# Patient Record
Sex: FEMALE | Race: WHITE | NOT HISPANIC OR LATINO | Employment: UNEMPLOYED | URBAN - METROPOLITAN AREA
[De-identification: names, ages, dates, MRNs, and addresses within clinical notes are randomized per-mention and may not be internally consistent; named-entity substitution may affect disease eponyms.]

---

## 2017-01-08 ENCOUNTER — HOSPITAL ENCOUNTER (INPATIENT)
Facility: HOSPITAL | Age: 62
LOS: 15 days | Discharge: NURSING, REHAB OR LONG-TERM CARE | DRG: 432 | End: 2017-01-23
Attending: EMERGENCY MEDICINE | Admitting: INTERNAL MEDICINE
Payer: MEDICAID

## 2017-01-08 DIAGNOSIS — K70.31 ASCITES DUE TO ALCOHOLIC CIRRHOSIS: ICD-10-CM

## 2017-01-08 DIAGNOSIS — R79.89 ELEVATED TROPONIN: ICD-10-CM

## 2017-01-08 DIAGNOSIS — E87.6 HYPOKALEMIA: ICD-10-CM

## 2017-01-08 DIAGNOSIS — E83.42 HYPOMAGNESEMIA: ICD-10-CM

## 2017-01-08 DIAGNOSIS — G93.41 ENCEPHALOPATHY, METABOLIC: ICD-10-CM

## 2017-01-08 DIAGNOSIS — N17.9 ACUTE ON CHRONIC RENAL FAILURE: Primary | ICD-10-CM

## 2017-01-08 DIAGNOSIS — N30.00 ACUTE CYSTITIS WITHOUT HEMATURIA: ICD-10-CM

## 2017-01-08 DIAGNOSIS — R53.1 GENERALIZED WEAKNESS: ICD-10-CM

## 2017-01-08 DIAGNOSIS — N18.9 ACUTE ON CHRONIC RENAL FAILURE: Primary | ICD-10-CM

## 2017-01-08 PROBLEM — F10.10 CHRONIC ALCOHOL ABUSE: Status: ACTIVE | Noted: 2017-01-08

## 2017-01-08 LAB
ALBUMIN SERPL BCP-MCNC: 2.1 G/DL
ALP SERPL-CCNC: 452 U/L
ALT SERPL W/O P-5'-P-CCNC: 47 U/L
AMMONIA PLAS-SCNC: 65 UMOL/L
AMPHET+METHAMPHET UR QL: NEGATIVE
ANION GAP SERPL CALC-SCNC: 19 MMOL/L
APAP SERPL-MCNC: <3 UG/ML
AST SERPL-CCNC: 119 U/L
BACTERIA #/AREA URNS HPF: ABNORMAL /HPF
BARBITURATES UR QL SCN>200 NG/ML: NEGATIVE
BASOPHILS # BLD AUTO: 0.02 K/UL
BASOPHILS NFR BLD: 0.3 %
BENZODIAZ UR QL SCN>200 NG/ML: NORMAL
BILIRUB SERPL-MCNC: 5.1 MG/DL
BILIRUB UR QL STRIP: ABNORMAL
BNP SERPL-MCNC: 189 PG/ML
BUN SERPL-MCNC: 40 MG/DL
BZE UR QL SCN: NEGATIVE
CALCIUM SERPL-MCNC: 8.6 MG/DL
CANNABINOIDS UR QL SCN: NEGATIVE
CHLORIDE SERPL-SCNC: 90 MMOL/L
CLARITY UR: ABNORMAL
CO2 SERPL-SCNC: 26 MMOL/L
COLOR UR: YELLOW
CREAT SERPL-MCNC: 2.9 MG/DL
CREAT UR-MCNC: 253.3 MG/DL
DIFFERENTIAL METHOD: ABNORMAL
EOSINOPHIL # BLD AUTO: 0 K/UL
EOSINOPHIL NFR BLD: 0 %
ERYTHROCYTE [DISTWIDTH] IN BLOOD BY AUTOMATED COUNT: 15.1 %
EST. GFR  (AFRICAN AMERICAN): 20 ML/MIN/1.73 M^2
EST. GFR  (NON AFRICAN AMERICAN): 17 ML/MIN/1.73 M^2
ETHANOL SERPL-MCNC: <10 MG/DL
GLUCOSE SERPL-MCNC: 94 MG/DL
GLUCOSE UR QL STRIP: NEGATIVE
HCT VFR BLD AUTO: 31.9 %
HGB BLD-MCNC: 11.1 G/DL
HGB UR QL STRIP: ABNORMAL
HYALINE CASTS #/AREA URNS LPF: 1 /LPF
INR PPP: 1.4
KETONES UR QL STRIP: NEGATIVE
LEUKOCYTE ESTERASE UR QL STRIP: ABNORMAL
LYMPHOCYTES # BLD AUTO: 0.8 K/UL
LYMPHOCYTES NFR BLD: 10.8 %
MAGNESIUM SERPL-MCNC: 1.5 MG/DL
MCH RBC QN AUTO: 31.5 PG
MCHC RBC AUTO-ENTMCNC: 34.8 %
MCV RBC AUTO: 91 FL
METHADONE UR QL SCN>300 NG/ML: NEGATIVE
MICROSCOPIC COMMENT: ABNORMAL
MONOCYTES # BLD AUTO: 0.9 K/UL
MONOCYTES NFR BLD: 12 %
NEUTROPHILS # BLD AUTO: 5.9 K/UL
NEUTROPHILS NFR BLD: 76.9 %
NITRITE UR QL STRIP: NEGATIVE
OPIATES UR QL SCN: NEGATIVE
PCP UR QL SCN>25 NG/ML: NEGATIVE
PH UR STRIP: 5 [PH] (ref 5–8)
PLATELET # BLD AUTO: 128 K/UL
PMV BLD AUTO: 11.1 FL
POTASSIUM SERPL-SCNC: 2.4 MMOL/L
PROT SERPL-MCNC: 7.4 G/DL
PROT UR QL STRIP: ABNORMAL
PROTHROMBIN TIME: 14.3 SEC
RBC # BLD AUTO: 3.52 M/UL
RBC #/AREA URNS HPF: 2 /HPF (ref 0–4)
SODIUM SERPL-SCNC: 135 MMOL/L
SP GR UR STRIP: 1.02 (ref 1–1.03)
SQUAMOUS #/AREA URNS HPF: 5 /HPF
TOXICOLOGY INFORMATION: NORMAL
TROPONIN I SERPL DL<=0.01 NG/ML-MCNC: 0.04 NG/ML
TROPONIN I SERPL DL<=0.01 NG/ML-MCNC: 0.04 NG/ML
URN SPEC COLLECT METH UR: ABNORMAL
UROBILINOGEN UR STRIP-ACNC: ABNORMAL EU/DL
WBC # BLD AUTO: 7.67 K/UL
WBC #/AREA URNS HPF: 30 /HPF (ref 0–5)

## 2017-01-08 PROCEDURE — 87088 URINE BACTERIA CULTURE: CPT

## 2017-01-08 PROCEDURE — 96365 THER/PROPH/DIAG IV INF INIT: CPT

## 2017-01-08 PROCEDURE — 80053 COMPREHEN METABOLIC PANEL: CPT

## 2017-01-08 PROCEDURE — 21400001 HC TELEMETRY ROOM

## 2017-01-08 PROCEDURE — P9612 CATHETERIZE FOR URINE SPEC: HCPCS

## 2017-01-08 PROCEDURE — 84484 ASSAY OF TROPONIN QUANT: CPT | Mod: 91

## 2017-01-08 PROCEDURE — 81000 URINALYSIS NONAUTO W/SCOPE: CPT

## 2017-01-08 PROCEDURE — 25000003 PHARM REV CODE 250: Performed by: EMERGENCY MEDICINE

## 2017-01-08 PROCEDURE — 85610 PROTHROMBIN TIME: CPT

## 2017-01-08 PROCEDURE — 36415 COLL VENOUS BLD VENIPUNCTURE: CPT

## 2017-01-08 PROCEDURE — 80329 ANALGESICS NON-OPIOID 1 OR 2: CPT

## 2017-01-08 PROCEDURE — 83880 ASSAY OF NATRIURETIC PEPTIDE: CPT

## 2017-01-08 PROCEDURE — 87077 CULTURE AEROBIC IDENTIFY: CPT

## 2017-01-08 PROCEDURE — 87040 BLOOD CULTURE FOR BACTERIA: CPT

## 2017-01-08 PROCEDURE — 25000003 PHARM REV CODE 250: Performed by: HOSPITALIST

## 2017-01-08 PROCEDURE — 80074 ACUTE HEPATITIS PANEL: CPT

## 2017-01-08 PROCEDURE — 82140 ASSAY OF AMMONIA: CPT

## 2017-01-08 PROCEDURE — 96367 TX/PROPH/DG ADDL SEQ IV INF: CPT

## 2017-01-08 PROCEDURE — 80320 DRUG SCREEN QUANTALCOHOLS: CPT

## 2017-01-08 PROCEDURE — 99285 EMERGENCY DEPT VISIT HI MDM: CPT | Mod: 25

## 2017-01-08 PROCEDURE — 63600175 PHARM REV CODE 636 W HCPCS: Performed by: EMERGENCY MEDICINE

## 2017-01-08 PROCEDURE — 83735 ASSAY OF MAGNESIUM: CPT

## 2017-01-08 PROCEDURE — 85025 COMPLETE CBC W/AUTO DIFF WBC: CPT

## 2017-01-08 PROCEDURE — 96361 HYDRATE IV INFUSION ADD-ON: CPT

## 2017-01-08 PROCEDURE — 96375 TX/PRO/DX INJ NEW DRUG ADDON: CPT

## 2017-01-08 PROCEDURE — 87186 SC STD MICRODIL/AGAR DIL: CPT

## 2017-01-08 PROCEDURE — 82570 ASSAY OF URINE CREATININE: CPT

## 2017-01-08 PROCEDURE — 87086 URINE CULTURE/COLONY COUNT: CPT

## 2017-01-08 RX ORDER — FOLIC ACID 1 MG/1
1 TABLET ORAL DAILY
Status: DISCONTINUED | OUTPATIENT
Start: 2017-01-09 | End: 2017-01-09

## 2017-01-08 RX ORDER — ATORVASTATIN CALCIUM 40 MG/1
40 TABLET, FILM COATED ORAL DAILY
COMMUNITY
End: 2018-07-25

## 2017-01-08 RX ORDER — SODIUM CHLORIDE 9 MG/ML
INJECTION, SOLUTION INTRAVENOUS CONTINUOUS
Status: ACTIVE | OUTPATIENT
Start: 2017-01-08 | End: 2017-01-09

## 2017-01-08 RX ORDER — POTASSIUM CHLORIDE 7.45 MG/ML
10 INJECTION INTRAVENOUS
Status: COMPLETED | OUTPATIENT
Start: 2017-01-08 | End: 2017-01-08

## 2017-01-08 RX ORDER — ASPIRIN 325 MG
325 TABLET ORAL
Status: COMPLETED | OUTPATIENT
Start: 2017-01-08 | End: 2017-01-08

## 2017-01-08 RX ORDER — SODIUM CHLORIDE 9 MG/ML
500 INJECTION, SOLUTION INTRAVENOUS
Status: COMPLETED | OUTPATIENT
Start: 2017-01-08 | End: 2017-01-08

## 2017-01-08 RX ORDER — TRAZODONE HYDROCHLORIDE 50 MG/1
50 TABLET ORAL NIGHTLY
COMMUNITY
End: 2018-07-25

## 2017-01-08 RX ORDER — HYDROCHLOROTHIAZIDE 25 MG/1
25 TABLET ORAL DAILY
COMMUNITY
End: 2018-07-25

## 2017-01-08 RX ORDER — RAMELTEON 8 MG/1
8 TABLET ORAL NIGHTLY PRN
Status: DISCONTINUED | OUTPATIENT
Start: 2017-01-08 | End: 2017-01-23 | Stop reason: HOSPADM

## 2017-01-08 RX ORDER — SERTRALINE HYDROCHLORIDE 100 MG/1
100 TABLET, FILM COATED ORAL DAILY
COMMUNITY
End: 2018-07-25

## 2017-01-08 RX ORDER — FOLIC ACID 1 MG/1
1 TABLET ORAL DAILY
COMMUNITY
End: 2018-07-25

## 2017-01-08 RX ORDER — ESOMEPRAZOLE MAGNESIUM 40 MG/1
40 CAPSULE, DELAYED RELEASE ORAL
COMMUNITY
End: 2018-07-25

## 2017-01-08 RX ORDER — MAGNESIUM SULFATE 1 G/100ML
2 INJECTION INTRAVENOUS
Status: COMPLETED | OUTPATIENT
Start: 2017-01-08 | End: 2017-01-08

## 2017-01-08 RX ORDER — ONDANSETRON 2 MG/ML
4 INJECTION INTRAMUSCULAR; INTRAVENOUS EVERY 8 HOURS PRN
Status: DISCONTINUED | OUTPATIENT
Start: 2017-01-08 | End: 2017-01-09

## 2017-01-08 RX ORDER — POTASSIUM CHLORIDE 20 MEQ/1
60 TABLET, EXTENDED RELEASE ORAL
Status: COMPLETED | OUTPATIENT
Start: 2017-01-08 | End: 2017-01-08

## 2017-01-08 RX ORDER — SODIUM CHLORIDE 0.9 % (FLUSH) 0.9 %
3 SYRINGE (ML) INJECTION EVERY 8 HOURS
Status: DISCONTINUED | OUTPATIENT
Start: 2017-01-08 | End: 2017-01-23 | Stop reason: HOSPADM

## 2017-01-08 RX ORDER — HYDROXYZINE PAMOATE 25 MG/1
25 CAPSULE ORAL 4 TIMES DAILY
COMMUNITY
End: 2018-07-25

## 2017-01-08 RX ADMIN — POTASSIUM CHLORIDE 10 MEQ: 10 INJECTION, SOLUTION INTRAVENOUS at 05:01

## 2017-01-08 RX ADMIN — SODIUM CHLORIDE: 0.9 INJECTION, SOLUTION INTRAVENOUS at 10:01

## 2017-01-08 RX ADMIN — RAMELTEON 8 MG: 8 TABLET, FILM COATED ORAL at 11:01

## 2017-01-08 RX ADMIN — SODIUM CHLORIDE 500 ML: 0.9 INJECTION, SOLUTION INTRAVENOUS at 05:01

## 2017-01-08 RX ADMIN — SODIUM CHLORIDE 500 ML: 0.9 INJECTION, SOLUTION INTRAVENOUS at 02:01

## 2017-01-08 RX ADMIN — Medication 3 ML: at 10:01

## 2017-01-08 RX ADMIN — MAGNESIUM SULFATE 2 G: 1 INJECTION INTRAVENOUS at 04:01

## 2017-01-08 RX ADMIN — ASPIRIN 325 MG ORAL TABLET 325 MG: 325 PILL ORAL at 06:01

## 2017-01-08 RX ADMIN — CEFTRIAXONE 2 G: 2 INJECTION, SOLUTION INTRAVENOUS at 02:01

## 2017-01-08 RX ADMIN — POTASSIUM CHLORIDE 60 MEQ: 1500 TABLET, EXTENDED RELEASE ORAL at 03:01

## 2017-01-08 NOTE — ED NOTES
Mother Ann Chrissara- [phone]/Sridhar (pt'd ride) [phone]. Call sridhar when pt is d/c. Call mother if she is admitted.

## 2017-01-08 NOTE — ED NOTES
"Pt's daughter Eli comes back into room and is intoxicated. She confesses to having alcoholic drinks prior to coming back into the room. Daughter is loud and crying, asked to leave the room bc she is visibly upsetting the pt. Patients states "she is always stressing me out. She's like that at home".   "

## 2017-01-08 NOTE — ED PROVIDER NOTES
Encounter Date: 1/8/2017    SCRIBE #1 NOTE: I, Juan Carlos Blaise, am scribing for, and in the presence of, Andrey Waters MD. Other sections scribed: HPI, ROS.       History     Chief Complaint   Patient presents with    Fatigue     EMS reports they wwere alled out for pt being waek, Room Air sats 90%, placed on 2L nc , went to 97%     Leg Pain     bilateral leg pain      Review of patient's allergies indicates:  Allergies not on file  HPI Comments: CC: Fatigue, Weakness  HPI: This 61 y.o. female with Crohn's disease presents to the ED via EMS c/o generalized weakness, worst in her legs, for the past week. Pt states that she is barely able to walk because her legs keep giving out. Pt states that she knows that she has had a UTI for the past week because she has been having intermittent dysuria. Sx are acute. She reports normal BM this morning. EMS reports initial SpO2 90% RA, rising to 97% on 2L NC. Pt arrives hypotensive. Pt denies abdominal pain, vomiting, chest pain.      The history is provided by the patient and the EMS personnel.     Past Medical History   Diagnosis Date    Crohn disease     Hypertension      No past medical history pertinent negatives.  History reviewed. No pertinent past surgical history.  History reviewed. No pertinent family history.  Social History   Substance Use Topics    Smoking status: Never Smoker    Smokeless tobacco: None    Alcohol use No     Review of Systems   Constitutional: Positive for fatigue. Negative for fever.   HENT: Negative for congestion and sore throat.    Eyes: Negative for pain and visual disturbance.   Respiratory: Negative for cough.    Cardiovascular: Negative for chest pain.   Gastrointestinal: Negative for abdominal pain, constipation, diarrhea and vomiting.   Genitourinary: Positive for dysuria. Negative for difficulty urinating and hematuria.   Musculoskeletal: Negative for back pain.   Skin: Negative for rash and wound.   Neurological: Positive for  weakness (generalized, worst in legs). Negative for syncope.   All other systems negative.      Physical Exam   Initial Vitals   BP Pulse Resp Temp SpO2   01/08/17 1244 01/08/17 1244 -- 01/08/17 1244 --   90/40 75  99.5 °F (37.5 °C)      Physical Exam   Nursing note and vitals reviewed.  Constitutional: Weak chronically ill female in no obvious distress.  HENT:    Head: NC/AT    Eyes: Conjunctivae normal.  (-) scleral icterus.              Mouth/Throat: Dry mucosal membranes.   Neck: Neck supple, normal rom.  Cardiovascular: RRR   Pulmonary/Chest: CTAB   Abdomen:  Soft, Moderately distended - (+) ascites.  Nontender - no guarding or rebound.  (-) CVA tenderness.  Musculoskeletal:  FROM of all major joints.  +1 pitting edema bilateral lower extremities without specific tenderness  Neurological: A&O x4.  No acute focal motor deficits.    Skin: Skin is intact, warm and dry.   Psychiatric: normal mood and affect.      ED Course   Critical Care  Date/Time: 1/8/2017 7:33 PM  Performed by: PATRICIA MAYNARD.  Authorized by: PATRICIA MAYNARD.   Direct patient critical care time: 20 minutes  Additional history critical care time: 10 minutes  Ordering / reviewing critical care time: 10 minutes  Documentation critical care time: 10 minutes  Consulting other physicians critical care time: 5 minutes  Total critical care time (exclusive of procedural time) : 55 minutes  Critical care time was exclusive of separately billable procedures and treating other patients and teaching time.  Critical care was necessary to treat or prevent imminent or life-threatening deterioration of the following conditions: hepatic failure and renal failure.  Critical care was time spent personally by me on the following activities: development of treatment plan with patient or surrogate, evaluation of patient's response to treatment, examination of patient, obtaining history from patient or surrogate, ordering and performing treatments and  interventions, ordering and review of laboratory studies, ordering and review of radiographic studies, pulse oximetry, re-evaluation of patient's condition and review of old charts.        Labs Reviewed   CBC W/ AUTO DIFFERENTIAL - Abnormal; Notable for the following:        Result Value    RBC 3.52 (*)     Hemoglobin 11.1 (*)     Hematocrit 31.9 (*)     MCH 31.5 (*)     RDW 15.1 (*)     Platelets 128 (*)     Lymph # 0.8 (*)     Gran% 76.9 (*)     Lymph% 10.8 (*)     All other components within normal limits   COMPREHENSIVE METABOLIC PANEL - Abnormal; Notable for the following:     Sodium 135 (*)     Potassium 2.4 (*)     Chloride 90 (*)     BUN, Bld 40 (*)     Creatinine 2.9 (*)     Calcium 8.6 (*)     Albumin 2.1 (*)     Total Bilirubin 5.1 (*)     Alkaline Phosphatase 452 (*)      (*)     ALT 47 (*)     Anion Gap 19 (*)     eGFR if  20 (*)     eGFR if non  17 (*)     All other components within normal limits    Narrative:      Potassium  critical result(s) called and verbal readback obtained   from Shira Polanco, 1/8/17 14:35, 01/08/2017 17:55   PROTIME-INR - Abnormal; Notable for the following:     Prothrombin Time 14.3 (*)     INR 1.4 (*)     All other components within normal limits   TROPONIN I - Abnormal; Notable for the following:     Troponin I 0.041 (*)     All other components within normal limits   MAGNESIUM - Abnormal; Notable for the following:     Magnesium 1.5 (*)     All other components within normal limits   B-TYPE NATRIURETIC PEPTIDE - Abnormal; Notable for the following:      (*)     All other components within normal limits   URINALYSIS - Abnormal; Notable for the following:     Appearance, UA Cloudy (*)     Protein, UA 1+ (*)     Bilirubin (UA) 1+ (*)     Occult Blood UA 2+ (*)     Urobilinogen, UA 4.0-6.0 (*)     Leukocytes, UA 2+ (*)     All other components within normal limits   URINALYSIS MICROSCOPIC - Abnormal; Notable for the following:      WBC, UA 30 (*)     Bacteria, UA Many (*)     All other components within normal limits   CULTURE, URINE   CULTURE, BLOOD   CULTURE, BLOOD   AMMONIA   HEPATITIS PANEL, ACUTE   ALCOHOL,MEDICAL (ETHANOL)   DRUG SCREEN PANEL, URINE EMERGENCY   ACETAMINOPHEN LEVEL     EKG Readings: (Independently Interpreted)   Initial Reading: No STEMI.   Normal sinus rhythm, rate 75, normal axis/intervals, nonspecific ST/T-wave abnormalities.     X-Rays:   Independently Interpreted Readings:   Chest X-Ray: Normal heart size. Bibasilar edema and/or atelectasis/infiltrate.   Other Readings:  Ultrasound abdomen - biliary sludge without evidence of acute cholecystitis.  Ascites of unknown etiology present.      Medical Decision Making:   History:   I obtained history from: someone other than patient.  Old Medical Records: I decided to obtain old medical records.  Old Records Summarized: records from clinic visits and other records.  Independently Interpreted Test(s):   I have ordered and independently interpreted X-rays - see prior notes.  I have ordered and independently interpreted EKG Reading(s) - see prior notes  Clinical Tests:   Lab Tests: Ordered and Reviewed  Radiological Study: Ordered and Reviewed  Medical Tests: Ordered and Reviewed    Additional Medical Decision Makin-year-old female with a history of chronic alcohol abuse/dependency, liver cirrhosis secondary to alcoholism, and COPD presents to the emergency department complaining of generalized weakness and bilateral lower extremity pain x7 days.  Last drink reportedly ~ .  Notable labs include a potassium of 2.4, hypomagnesemia, renal insufficiency, elevated LFTs, coags, and trop (0.041).  On repeat exams, her abdomen has remained soft and nontender.  IV Mg and KCl (IV and PO) have been given.   UA consistent with likely UTi which she has been started on IV Rocephin.  Blood and urine cultures pending.    Despite the elevated troponin, her ekg is w/o  evidence of acute ischemia.  Given her ekg, lack of complaints such as chest pain, shortness of breath, nausea and/or diaphoresis along w/ renal insufficiency, I doubt ACS.  Regardless, aspirin has been given and serial cardiac enzymes to be obtained.       Generalized weakness likely multifactorial including deconditioning/malnutrition, and liver failure resulting ascites, significant electrolyte abnormalities including potassium and magnesium, as well as ongoing UTI.  She will be admitted to medicine for further evaluation and management including serial cardiac enzymes, neuro checks and continued IV antibiotics.  At time of admission, UDS, acute hepatitis panel, ammonia, serum ethanol and Tylenol levels were pending.              Scribe Attestation:   Scribe #1: I performed the above scribed service and the documentation accurately describes the services I performed. I attest to the accuracy of the note.    Attending Attestation:           Physician Attestation for Scribe:  Physician Attestation Statement for Scribe #1: I, Andrey Waters MD, reviewed documentation, as scribed by Juan Carlos Welsh in my presence, and it is both accurate and complete.                 ED Course     Clinical Impression:   The primary encounter diagnosis was Acute on chronic renal failure. Diagnoses of Hypokalemia, Hypomagnesemia, Encephalopathy, metabolic, Ascites due to alcoholic cirrhosis, Generalized weakness, Acute cystitis without hematuria, and Elevated troponin were also pertinent to this visit.    Disposition:   Disposition: Admitted       Andrey Waters MD  01/08/17 2052

## 2017-01-08 NOTE — ED TRIAGE NOTES
"Pt reports bilateral leg pain for 1 week, also c/o fatigue progressively getting worse for 1 week. States "i can hardly get up and walk anymore". PT in no acute distress, appears fatigued, but otherwise AAOX4. Daughter is at the bedside. Daughter appears to be very emotional, pt admits daughter "stresses her out". Daughter Eli asked to wait outside until we run all the tests so that pt can get some rest.   "

## 2017-01-09 LAB
ALBUMIN SERPL BCP-MCNC: 1.8 G/DL
ALP SERPL-CCNC: 352 U/L
ALT SERPL W/O P-5'-P-CCNC: 38 U/L
ANION GAP SERPL CALC-SCNC: 16 MMOL/L
AST SERPL-CCNC: 102 U/L
BASOPHILS # BLD AUTO: 0.02 K/UL
BASOPHILS NFR BLD: 0.3 %
BILIRUB SERPL-MCNC: 3.4 MG/DL
BUN SERPL-MCNC: 39 MG/DL
CALCIUM SERPL-MCNC: 8 MG/DL
CHLORIDE SERPL-SCNC: 95 MMOL/L
CO2 SERPL-SCNC: 24 MMOL/L
CREAT SERPL-MCNC: 2.6 MG/DL
DIFFERENTIAL METHOD: ABNORMAL
EOSINOPHIL # BLD AUTO: 0.1 K/UL
EOSINOPHIL NFR BLD: 1.7 %
ERYTHROCYTE [DISTWIDTH] IN BLOOD BY AUTOMATED COUNT: 15.1 %
EST. GFR  (AFRICAN AMERICAN): 22 ML/MIN/1.73 M^2
EST. GFR  (NON AFRICAN AMERICAN): 19 ML/MIN/1.73 M^2
GLUCOSE SERPL-MCNC: 91 MG/DL
HAV IGM SERPL QL IA: NEGATIVE
HBV CORE IGM SERPL QL IA: NEGATIVE
HBV SURFACE AG SERPL QL IA: NEGATIVE
HCT VFR BLD AUTO: 25.5 %
HCV AB SERPL QL IA: NEGATIVE
HGB BLD-MCNC: 8.7 G/DL
LYMPHOCYTES # BLD AUTO: 1 K/UL
LYMPHOCYTES NFR BLD: 16.9 %
MAGNESIUM SERPL-MCNC: 2 MG/DL
MCH RBC QN AUTO: 30.3 PG
MCHC RBC AUTO-ENTMCNC: 34.1 %
MCV RBC AUTO: 89 FL
MONOCYTES # BLD AUTO: 0.9 K/UL
MONOCYTES NFR BLD: 15 %
NEUTROPHILS # BLD AUTO: 3.8 K/UL
NEUTROPHILS NFR BLD: 66.1 %
PLATELET # BLD AUTO: 113 K/UL
PMV BLD AUTO: 11.2 FL
POTASSIUM SERPL-SCNC: 2.5 MMOL/L
POTASSIUM SERPL-SCNC: 3 MMOL/L
PROT SERPL-MCNC: 6 G/DL
RBC # BLD AUTO: 2.87 M/UL
SODIUM SERPL-SCNC: 135 MMOL/L
TROPONIN I SERPL DL<=0.01 NG/ML-MCNC: 0.05 NG/ML
WBC # BLD AUTO: 5.81 K/UL

## 2017-01-09 PROCEDURE — 85025 COMPLETE CBC W/AUTO DIFF WBC: CPT

## 2017-01-09 PROCEDURE — G8978 MOBILITY CURRENT STATUS: HCPCS | Mod: CK

## 2017-01-09 PROCEDURE — 36415 COLL VENOUS BLD VENIPUNCTURE: CPT

## 2017-01-09 PROCEDURE — 84132 ASSAY OF SERUM POTASSIUM: CPT

## 2017-01-09 PROCEDURE — 0W9G3ZZ DRAINAGE OF PERITONEAL CAVITY, PERCUTANEOUS APPROACH: ICD-10-PCS | Performed by: RADIOLOGY

## 2017-01-09 PROCEDURE — 25000003 PHARM REV CODE 250: Performed by: HOSPITALIST

## 2017-01-09 PROCEDURE — 63600175 PHARM REV CODE 636 W HCPCS: Performed by: HOSPITALIST

## 2017-01-09 PROCEDURE — 21400001 HC TELEMETRY ROOM

## 2017-01-09 PROCEDURE — 97161 PT EVAL LOW COMPLEX 20 MIN: CPT

## 2017-01-09 PROCEDURE — 84484 ASSAY OF TROPONIN QUANT: CPT

## 2017-01-09 PROCEDURE — G8979 MOBILITY GOAL STATUS: HCPCS | Mod: CI

## 2017-01-09 PROCEDURE — 94761 N-INVAS EAR/PLS OXIMETRY MLT: CPT

## 2017-01-09 PROCEDURE — 99000 SPECIMEN HANDLING OFFICE-LAB: CPT

## 2017-01-09 PROCEDURE — 25000003 PHARM REV CODE 250: Performed by: EMERGENCY MEDICINE

## 2017-01-09 PROCEDURE — 63600175 PHARM REV CODE 636 W HCPCS: Performed by: EMERGENCY MEDICINE

## 2017-01-09 PROCEDURE — 83735 ASSAY OF MAGNESIUM: CPT

## 2017-01-09 PROCEDURE — 80053 COMPREHEN METABOLIC PANEL: CPT

## 2017-01-09 RX ORDER — POTASSIUM CHLORIDE 20 MEQ/15ML
60 SOLUTION ORAL DAILY
Status: DISCONTINUED | OUTPATIENT
Start: 2017-01-09 | End: 2017-01-09

## 2017-01-09 RX ORDER — POTASSIUM CHLORIDE 20 MEQ/15ML
60 SOLUTION ORAL ONCE
Status: COMPLETED | OUTPATIENT
Start: 2017-01-09 | End: 2017-01-09

## 2017-01-09 RX ORDER — FOLIC ACID 1 MG/1
1 TABLET ORAL DAILY
Status: DISCONTINUED | OUTPATIENT
Start: 2017-01-10 | End: 2017-01-23 | Stop reason: HOSPADM

## 2017-01-09 RX ORDER — ONDANSETRON 2 MG/ML
8 INJECTION INTRAMUSCULAR; INTRAVENOUS EVERY 8 HOURS PRN
Status: DISCONTINUED | OUTPATIENT
Start: 2017-01-09 | End: 2017-01-23 | Stop reason: HOSPADM

## 2017-01-09 RX ORDER — LACTULOSE 10 G/15ML
30 SOLUTION ORAL 3 TIMES DAILY
Status: DISCONTINUED | OUTPATIENT
Start: 2017-01-09 | End: 2017-01-23 | Stop reason: HOSPADM

## 2017-01-09 RX ORDER — DIAZEPAM 5 MG/1
5 TABLET ORAL EVERY 8 HOURS
Status: DISCONTINUED | OUTPATIENT
Start: 2017-01-09 | End: 2017-01-09

## 2017-01-09 RX ADMIN — LACTULOSE 30 G: 10 SOLUTION ORAL at 02:01

## 2017-01-09 RX ADMIN — LACTULOSE 30 G: 10 SOLUTION ORAL at 09:01

## 2017-01-09 RX ADMIN — DIAZEPAM 5 MG: 5 TABLET ORAL at 05:01

## 2017-01-09 RX ADMIN — Medication 3 ML: at 10:01

## 2017-01-09 RX ADMIN — CEFTRIAXONE 2 G: 2 INJECTION, SOLUTION INTRAVENOUS at 02:01

## 2017-01-09 RX ADMIN — Medication 3 ML: at 02:01

## 2017-01-09 RX ADMIN — RIFAXIMIN 550 MG: 550 TABLET ORAL at 11:01

## 2017-01-09 RX ADMIN — RAMELTEON 8 MG: 8 TABLET, FILM COATED ORAL at 09:01

## 2017-01-09 RX ADMIN — LACTULOSE 30 G: 10 SOLUTION ORAL at 05:01

## 2017-01-09 RX ADMIN — CEFTRIAXONE 2 G: 2 INJECTION, SOLUTION INTRAVENOUS at 04:01

## 2017-01-09 RX ADMIN — SODIUM CHLORIDE 500 ML: 0.9 INJECTION, SOLUTION INTRAVENOUS at 05:01

## 2017-01-09 RX ADMIN — RIFAXIMIN 550 MG: 550 TABLET ORAL at 09:01

## 2017-01-09 RX ADMIN — POTASSIUM CHLORIDE 60 MEQ: 20 SOLUTION ORAL at 04:01

## 2017-01-09 RX ADMIN — FOLIC ACID: 5 INJECTION, SOLUTION INTRAMUSCULAR; INTRAVENOUS; SUBCUTANEOUS at 05:01

## 2017-01-09 RX ADMIN — Medication 3 ML: at 06:01

## 2017-01-09 NOTE — ASSESSMENT & PLAN NOTE
· As evidenced by physical exam and lab studies  · Hepatitis panel pending  · Diagnostic paracentesis by IR

## 2017-01-09 NOTE — PT/OT/SLP EVAL
Physical Therapy  Evaluation    Tana Brewster   MRN: [id number]   Admitting Diagnosis: Generalized weakness    PT Received On: 17  PT Start Time: 1503     PT Stop Time: 1516    PT Total Time (min): 13 min       Billable Minutes:  Evaluation 13     Diagnosis: Generalized weakness  Frequent falls at home    Past Medical History   Diagnosis Date    Crohn disease     Hypertension       History reviewed. No pertinent past surgical history.    Referring physician: Vinnie  Date referred to PT: 17    General Precautions: Standard, fall  Orthopedic Precautions: Full weight bearing   Braces: N/A       Patient History:  Lives With: child(crystal), adult (daughter )  Living Arrangements: house  Home Accessibility: stairs to enter home  Number of Stairs to Enter Home: 6  Stair Railings at Home: outside, present on right side  Living Environment Comment: Per patient and mother she has been getting weak the past month.   Equipment Currently Used at Home: cane, straight, walker, rolling, shower chair    Previous Level of Function:  Ambulation Skills: needs device (was getting weak the past month and started using a cane. she has had several falls at home and on one fall she took her daughter down with her. )  Transfer Skills: needs device    Subjective:  Communicated with nurse Henley prior to session.  Patient agreeable to try and participate with PT after max encouragement.   Chief Complaint: Weakness and fatigue.   Patient goals: To get stronger.     Pain Ratin/10     Objective:   Patient found with: peripheral IV, telemetry     Cognitive Exam:  Oriented to: Person and Place    Follows Commands/attention: Follows one-step commands  Communication: clear/fluent  Safety awareness/insight to disability: impaired    Physical Exam:  Postural examination/scapula alignment: No postural abnormalities identified    Skin integrity: Visible skin intact  Edema: None noted     Sensation:   Intact    Lower Extremity Range of Motion:  Right  Lower Extremity: WFL  Left Lower Extremity: WFL    Lower Extremity Strength:  Right Lower Extremity: 3+/5  Left Lower Extremity: 3+/5     Gross motor coordination: WFL    Functional Mobility:  Bed Mobility:  Scooting/Bridging: Stand by Assistance, With side rail (to scoot higher in bed)  Supine to Sit: Minimum Assistance, With side rail  Sit to Supine: Stand by Assistance    Transfers:  Sit <> Stand Assistance:  (patient declined due to fatigue)    Balance:   Static Sit: FAIR+: Able to take MINIMAL challenges from all directions Patient only tolerated sitting on edge of bed ~5 minutes then requested to lay back down due to fatigue.   Dynamic Sit: FAIR+: Maintains balance through MINIMAL excursions of active trunk motion    AM-PAC 6 CLICK MOBILITY  How much help from another person does this patient currently need?   1 = Unable, Total/Dependent Assistance  2 = A lot, Maximum/Moderate Assistance  3 = A little, Minimum/Contact Guard/Supervision  4 = None, Modified [place]/Independent    Turning over in bed (including adjusting bedclothes, sheets and blankets)?: 3  Sitting down on and standing up from a chair with arms (e.g., wheelchair, bedside commode, etc.): 3  Moving from lying on back to sitting on the side of the bed?: 3  Moving to and from a bed to a chair (including a wheelchair)?: 3  Need to walk in hospital room?: 3  Climbing 3-5 steps with a railing?: 2  Total Score: 17     AM-PAC Raw Score CMS G-Code Modifier Level of Impairment Assistance   6 % Total / Unable   7 - 9 CM 80 - 100% Maximal Assist   10 - 14 CL 60 - 80% Moderate Assist   15 - 19 CK 40 - 60% Moderate Assist   20 - 22 CJ 20 - 40% Minimal Assist   23 CI 1-20% SBA / CGA   24 CH 0% Independent/ Mod I     Patient left supine with all lines intact, call button in reach, nurse notified and mother present.    Assessment:   Tana Brewster is a 61 y.o. female with a medical diagnosis of Generalized weakness and presents with weakness and fatigue.  She needed max encouragement to sit on edge of bed but declined standing despite encouragement from PT and mother. She would continue to benefit from PT while in the hospital in order to address deficits listed below and get patient back to PLOF.     Rehab identified problem list/impairments: Rehab identified problem list/impairments: weakness, impaired endurance, impaired functional mobilty, gait instability, impaired balance, decreased lower extremity function    Rehab potential is fair.    Activity tolerance: Poor    Discharge recommendations: Discharge Facility/Level Of Care Needs:  (TBD based on progress)     Barriers to discharge: Barriers to Discharge: Decreased caregiver support    Equipment recommendations: Equipment Needed After Discharge: bedside commode     GOALS:   Physical Therapy Goals        Problem: Physical Therapy Goal    Goal Priority Disciplines Outcome Goal Variances Interventions   Physical Therapy Goal     PT/OT, PT      Description:  Goals to be met by: 17    Patient will increase functional independence with mobility by performin. Supine to sit with supervision  2. Sit to stand transfer assess when able  3. Gait assess when able  4. Lower extremity exercise program x30 reps per handout, with supervision              PLAN:    Patient to be seen 6 x/week to address the above listed problems via gait training, therapeutic activities, therapeutic exercises  Plan of Care expires: 17  Plan of Care reviewed with: patient, mother    Functional Assessment Tool Used: AM PAC   Score: 17  Functional Limitation: Mobility: Walking and moving around  Mobility: Walking and Moving Around Current Status (): CK  Mobility: Walking and Moving Around Goal Status (): ANA Mendes, PT, MOT  2017

## 2017-01-09 NOTE — PLAN OF CARE
Recommendations     Recommendation/Intervention:  1. Rec add boost plus tid   2. Rec lifting Renal diet restriction with low K levels  3. RD to monitor  Goals: Pt. to meet >85% EEN  Nutrition Goal Status: new  Communication of RD Recs: reviewed with RN     Continuum of Care Plan     Referral to Outpatient Services: (D/C plannin gm sodium diet with supplements as needed)

## 2017-01-09 NOTE — PLAN OF CARE
Problem: Fall Risk (Adult)  Goal: Identify Related Risk Factors and Signs and Symptoms  Related risk factors and signs and symptoms are identified upon initiation of Human Response Clinical Practice Guideline (CPG)   Outcome: Ongoing (interventions implemented as appropriate)    01/09/17 1727   Fall Risk   Related Risk Factors (Fall Risk) bladder function altered;depression/anxiety;history of falls;polypharmacy;environment unfamiliar       Goal: Absence of Falls  Patient will demonstrate the desired outcomes by discharge/transition of care.   Outcome: Ongoing (interventions implemented as appropriate)    01/09/17 1727   Fall Risk (Adult)   Absence of Falls making progress toward outcome         Problem: Infection, Risk/Actual (Adult)  Goal: Identify Related Risk Factors and Signs and Symptoms  Related risk factors and signs and symptoms are identified upon initiation of Human Response Clinical Practice Guideline (CPG)   Outcome: Ongoing (interventions implemented as appropriate)    01/09/17 1727   Infection, Risk/Actual   Related Risk Factors (Infection, Risk/Actual) tissue perfusion altered;malnutrition   Signs and Symptoms (Infection, Risk/Actual) lab value changes       Goal: Infection Prevention/Resolution  Patient will demonstrate the desired outcomes by discharge/transition of care.   Outcome: Ongoing (interventions implemented as appropriate)    01/09/17 1727   Infection, Risk/Actual (Adult)   Infection Prevention/Resolution making progress toward outcome         Problem: Patient Care Overview  Goal: Plan of Care Review  Outcome: Ongoing (interventions implemented as appropriate)    01/09/17 1727   Coping/Psychosocial   Plan Of Care Reviewed With patient       Goal: Individualization & Mutuality  Outcome: Ongoing (interventions implemented as appropriate)    01/09/17 1727   Right Care Assessment   Number of comorbid conditions (as recorded on the chart) Alcohol abuse         Problem: Pressure Ulcer Risk  (Gerhard Scale) (Adult,Obstetrics,Pediatric)  Goal: Skin Integrity  Patient will demonstrate the desired outcomes by discharge/transition of care.   Outcome: Ongoing (interventions implemented as appropriate)    01/09/17 1727   Pressure Ulcer Risk (Gerhard Scale) (Adult,Obstetrics,Pediatric)   Skin Integrity making progress toward outcome         Problem: Liver Failure, Acute/Chronic (Adult)  Goal: Signs and Symptoms of Listed Potential Problems Will be Absent, Minimized or Managed (Liver Failure, Acute/Chronic)  Signs and symptoms of listed potential problems will be absent, minimized or managed by discharge/transition of care (reference Liver Failure, Acute/Chronic (Adult) CPG).   Outcome: Ongoing (interventions implemented as appropriate)    01/09/17 1727   Liver Failure, Acute/Chronic   Problems Assessed (Liver Failure, Acute/Chronic) all   Problems Present (Liver Failure, Acute/Chronic) fluid/electrolyte imbalance;malnutrition;renal dysfunction

## 2017-01-09 NOTE — NURSING
Pt arrived on the unit via stretcher accompanied by transport.  Pt unable to assist with movement to the bed and was pulled over by transport and nurse.  Pt states her legs are weak and she has been having trouble with moving them.  Tele monitor applied - SR.  Resp even NL on RA.  NS started at 100cc's/hr.  Applied diaper as pt unable to assist with the movement of her legs.  Abdomen round and distended with active bowel sounds.  Admit assessment complete.  Plan of care discussed with pt.  Pt stating she is hungary.  Gave pt a sandwich tray.  Pt unable to eat.  Pt asking for a sleeping pill.  Called and spoke with Dr Diaz.  New orders received.  Advised pt to call for assistance.  Call light in reach, bed alarm on.

## 2017-01-09 NOTE — ED NOTES
Pt's home meds placed in med envelope. Pt signed and given copy of receipt. . Meds in room in envelope, will give to House Sup once official admit orders are placed. Will pass info to night RN.

## 2017-01-09 NOTE — PT/OT/SLP PROGRESS
Occupational Therapy      Tana Ney  MRN: [id number]    Patient not seen today secondary to off the floor at paracentesis. Will follow-up as able.    DULCE Roper, MS  1/9/2017

## 2017-01-09 NOTE — ASSESSMENT & PLAN NOTE
· Multifactorial:  ARF, UTI, chronic alcoholism, malnutrition  · Treatment of individual problems as outlined below

## 2017-01-09 NOTE — NURSING
Returned to room via bed with transport in stable condition. Gauze noted to right abdomen clean and dry. Pt denies pain or discomfort.

## 2017-01-09 NOTE — ASSESSMENT & PLAN NOTE
Acute renal failure  · As evidenced by decrease in GFR.  Baseline creatinine = unknownn  · Will evaluate for pre-renal, intrarenal, and post-obstructive etiology.  · Obtain:  1.  protein/creatinine ratio  2. urine and serum osmolalities  3. urine electrolytes (Na, Cl, K)  4. LDH  5. Complement  6. Her's stain for osinophils  · Renal ultrasound  · Monitor with serial Cr / GFR levels closely with serial labs  · Avoid nephrotoxic medications such as NSAIDs, IV contrast, or RAAS blockade  · Nephrology consult

## 2017-01-09 NOTE — PT/OT/SLP PROGRESS
Physical Therapy      Tana Ney  MRN: [id number]    Patient not seen today secondary to other (off floor for procedure). Will follow-up again as able.    Rashmi Mendes, PT, MOT

## 2017-01-09 NOTE — CONSULTS
[facility]  History & Physical - Short Stay  Interventional Radiology    SUBJECTIVE:     Chief Complaint/Reason for Admission: ascites    History of Present Illness:  Tana Brewster is a 61 y.o. female with a history of ascites.      OBJECTIVE:     Vital Signs (Most Recent):  Temp: 98.4 °F (36.9 °C) (01/09/17 1211)  Pulse: 79 (01/09/17 1211)  Resp: 18 (01/09/17 1211)  BP: 95/62 (01/09/17 1211)  SpO2: 97 % (01/09/17 1211)    Physical Exam:  Awake, alert and oriented    ASSESSMENT/PLAN:     Ascites.    Patient will undergo paracentesis.    Sedation Plan: lidocaine

## 2017-01-09 NOTE — H&P
[facility] Hospital Medicine  History & Physical    Patient Name: Tana Brewster  MRN: [id number]  Admission Date: 01/08/2017  Attending Physician: Miguel Diaz MD, MPH      PCP:     Provider [facility]    CC:     Chief Complaint   Patient presents with    Fatigue     EMS reports they wwere alled out for pt being waek, Room Air sats 90%, placed on 2L nc , went to 97%     Leg Pain     bilateral leg pain        HISTORY OF PRESENT ILLNESS:     Tana Brewster is a 61 y.o. female that  has a past medical history of Crohn disease and Hypertension. Presents to [facility] Emergency Department complaining of generalized weakness.  She has a long-standing history of chronic alcoholism.  She states she has had subacute onset of generalized weakness that is progressively worsened over the last week.  Her symptoms are constant.  She thinks they are possibly exacerbated by decreased appetite and oral intake.  Associated symptoms include ataxia and bilateral lower extremity weakness.  Associated symptoms also include intermittent dysuria and she feels like she has a urinary tract infection.  She denies abdominal pain but does have some suprapubic discomfort.  She had a normal bowel movement this morning and denies melena, hematochezia, hematemesis, nausea, vomiting, chest pain, shortness of breath, syncope, vertigo, or tremors, fever, chills, or skin lesions or infections.    In the emergency department routine laboratory studies were obtained which demonstrated multiple abnormalities including evidence of urinary tract infection and electrolyte abnormalities.      Hospital medicine has been asked to admit for further evaluation and treatment.       REVIEW OF SYSTEMS:     -- Constitutional: Generalized fatigue and weakness.  No fever or chills.  -- Eyes: No visual changes, diplopia, pain, tearing, blind spots, or discharge.   -- Ears, nose, mouth, throat, and face: Dry mouth.  No  congestion, sore throat, epistaxis, d/c, bleeding gums, neck stiffness masses, or dental issues.  -- Respiratory: No cough, shortness of breath, hemoptysis, stridor, wheezing, or night sweats.  -- Cardiovascular: No chest pain, TURK, syncope, PND, edema, cyanosis, or palpitations.   -- Gastrointestinal: No vomiting, abdominal pain, dysphagia, hematemesis, melena, dyspepsia, or change in bowel habits.  -- Genitourinary: As above in history of present illness.  -- Integument/breast: No rash, pruritis, pigmentation changes, dryness, or changes in hair  -- Hematologic/lymphatic: No easy bruising or lymphadenopathy.   -- Musculoskeletal: As above in history of present illness.  -- Neurological: As above in history of present illness.  -- Behavioral/Psych: No auditory or visual hallucinations, depression, or suicidal/homicidal ideations.  -- Endocrine: No heat or cold intolerance, polydipsia, or unintentional weight gain / loss.  -- Allergy/Immunologic: No recurrent infections or adverse reaction to food, insects, or difficulty breathing.    Pain Scale  0 on scale of 1 to 10    PAST MEDICAL / SURGICAL HISTORY:     Past Medical History   Diagnosis Date    Crohn disease     Hypertension      History reviewed. No pertinent past surgical history.      FAMILY HISTORY:     History reviewed. No pertinent family history.      SOCIAL HISTORY:     Social History   Substance Use Topics    Smoking status: Never Smoker    Smokeless tobacco: None    Alcohol use No     Social History     Social History    Marital status:      Spouse name: N/A    Number of children: N/A    Years of education: N/A     Social History Main Topics    Smoking status: Never Smoker    Smokeless tobacco: None    Alcohol use No    Drug use: None    Sexual activity: Not Asked     Other Topics Concern    None     Social History Narrative    None         ALLERGIES:       Review of patient's allergies indicates:  No Known Allergies      HEALTH  SCREENING:     -- Prevnar 13 pneumonia vaccine = evidence of previous vaccination found in the medical record - 11/16  -- Influenza vaccine = no evidence of current flu vaccination found in the medical record for this flu season      HOME MEDICATIONS:     Prior to Admission medications    Medication Sig Start Date End Date Taking? Authorizing Provider   atorvastatin (LIPITOR) 40 MG tablet Take 40 mg by mouth once daily.   Yes Historical Provider, MD   esomeprazole (NEXIUM) 40 MG capsule Take 40 mg by mouth before breakfast.   Yes Historical Provider, MD   folic acid (FOLVITE) 1 MG tablet Take 1 mg by mouth once daily.   Yes Historical Provider, MD   hydrochlorothiazide (HYDRODIURIL) 25 MG tablet Take 25 mg by mouth once daily.   Yes Historical Provider, MD   hydrOXYzine pamoate (VISTARIL) 25 MG Cap Take 25 mg by mouth 4 (four) times daily.   Yes Historical Provider, MD   sertraline (ZOLOFT) 100 MG tablet Take 100 mg by mouth once daily.   Yes Historical Provider, MD   trazodone (DESYREL) 50 MG tablet Take 50 mg by mouth every evening.   Yes Historical Provider, MD         HOSPITAL MEDICATIONS:     Scheduled Meds:  Continuous Infusions:  PRN Meds:.      PHYSICAL EXAM:     Body mass index is 32.74 kg/(m^2).  Wt Readings from Last 1 Encounters:   01/08/17 1244 81.2 kg (179 lb)       Vitals:    01/08/17 1825 01/08/17 1916 01/08/17 1925 01/08/17 1955   BP: (!) 99/48 (!) 90/50 (!) 97/49 (!) 98/49   Patient Position:       Pulse: 82 78 80 76   Temp:       TempSrc:       SpO2: 96% 98% 97% 97%   Weight:       Height:              -- General appearance: well developed. appears older than stated age   -- Head: normocephalic, atraumatic   -- Eyes: conjunctivae clear. Extraocular muscles intact  -- Nose: Nares normal. Septum midline.   -- Throat: lips, mucosa, and tongue normal. no throat erythema.   -- Neck: supple, symmetrical, trachea midline, no JVD and thyroid not grossly enlarged, appears symmetric  -- Lungs: clear to  auscultation bilaterally. normal respiratory effort. No use of accessory muscles.   -- Chest wall: no tenderness. equal bilateral chest rise   -- Heart: regular rate and rhythm. S1, S2 normal.  no click, rub or gallop   -- Abdomen: + Fluid wave with evidence of Ascites.  Distended.  soft, non-tender, non-tympanic; bowel sounds normal but distant; no masses  -- Extremities: no cyanosis, clubbing.  1+ bilateral lower extremity edema.   -- Pulses: 2+ and symmetric   -- Skin: color normal, texture normal, turgor normal. No rashes or lesions.   -- Neurologic: Normal strength and tone. No focal numbness or weakness. CNII-XII intact. Flip coma scale: eyes open spontaneously-4, oriented & converses-5, obeys commands-6.      LABORATORY STUDIES:     Recent Results (from the past 36 hour(s))   CBC auto differential    Collection Time: 01/08/17  1:25 PM   Result Value Ref Range    WBC 7.67 3.90 - 12.70 K/uL    RBC 3.52 (L) 4.00 - 5.40 M/uL    Hemoglobin 11.1 (L) 12.0 - 16.0 g/dL    Hematocrit 31.9 (L) 37.0 - 48.5 %    MCV 91 82 - 98 fL    MCH 31.5 (H) 27.0 - 31.0 pg    MCHC 34.8 32.0 - 36.0 %    RDW 15.1 (H) 11.5 - 14.5 %    Platelets 128 (L) 150 - 350 K/uL    MPV 11.1 9.2 - 12.9 fL    Gran # 5.9 1.8 - 7.7 K/uL    Lymph # 0.8 (L) 1.0 - 4.8 K/uL    Mono # 0.9 0.3 - 1.0 K/uL    Eos # 0.0 0.0 - 0.5 K/uL    Baso # 0.02 0.00 - 0.20 K/uL    Gran% 76.9 (H) 38.0 - 73.0 %    Lymph% 10.8 (L) 18.0 - 48.0 %    Mono% 12.0 4.0 - 15.0 %    Eosinophil% 0.0 0.0 - 8.0 %    Basophil% 0.3 0.0 - 1.9 %    Differential Method Automated    Comprehensive metabolic panel    Collection Time: 01/08/17  1:25 PM   Result Value Ref Range    Sodium 135 (L) 136 - 145 mmol/L    Potassium 2.4 (LL) 3.5 - 5.1 mmol/L    Chloride 90 (L) 95 - 110 mmol/L    CO2 26 23 - 29 mmol/L    Glucose 94 70 - 110 mg/dL    BUN, Bld 40 (H) 6 - 20 mg/dL    Creatinine 2.9 (H) 0.5 - 1.4 mg/dL    Calcium 8.6 (L) 8.7 - 10.5 mg/dL    Total Protein 7.4 6.0 - 8.4 g/dL    Albumin 2.1 (L)  3.5 - 5.2 g/dL    Total Bilirubin 5.1 (H) 0.1 - 1.0 mg/dL    Alkaline Phosphatase 452 (H) 55 - 135 U/L     (H) 10 - 40 U/L    ALT 47 (H) 10 - 44 U/L    Anion Gap 19 (H) 8 - 16 mmol/L    eGFR if African American 20 (A) >60 mL/min/1.73 m^2    eGFR if non African American 17 (A) >60 mL/min/1.73 m^2   Protime-INR    Collection Time: 01/08/17  1:25 PM   Result Value Ref Range    Prothrombin Time 14.3 (H) 9.0 - 12.5 sec    INR 1.4 (H) 0.8 - 1.2   Troponin I    Collection Time: 01/08/17  1:25 PM   Result Value Ref Range    Troponin I 0.041 (H) 0.000 - 0.026 ng/mL   Magnesium    Collection Time: 01/08/17  1:25 PM   Result Value Ref Range    Magnesium 1.5 (L) 1.6 - 2.6 mg/dL   B-Type natriuretic peptide (BNP)    Collection Time: 01/08/17  1:25 PM   Result Value Ref Range     (H) 0 - 99 pg/mL   Urinalysis    Collection Time: 01/08/17  1:50 PM   Result Value Ref Range    Specimen UA Urine, Catheterized     Color, UA Yellow Yellow, Straw, Cielo    Appearance, UA Cloudy (A) Clear    pH, UA 5.0 5.0 - 8.0    Specific Gravity, UA 1.020 1.005 - 1.030    Protein, UA 1+ (A) Negative    Glucose, UA Negative Negative    Ketones, UA Negative Negative    Bilirubin (UA) 1+ (A) Negative    Occult Blood UA 2+ (A) Negative    Nitrite, UA Negative Negative    Urobilinogen, UA 4.0-6.0 (A) <2.0 EU/dL    Leukocytes, UA 2+ (A) Negative   Urinalysis Microscopic    Collection Time: 01/08/17  1:50 PM   Result Value Ref Range    RBC, UA 2 0 - 4 /hpf    WBC, UA 30 (H) 0 - 5 /hpf    Bacteria, UA Many (A) None-Occ /hpf    Squam Epithel, UA 5 /hpf    Hyaline Casts, UA 1 0-1/lpf /lpf    Microscopic Comment SEE COMMENT        Lab Results   Component Value Date    INR 1.4 (H) 01/08/2017       MICROBIOLOGY DATA:       Microbiology x 7d:   Microbiology Results (last 7 days)     Procedure Component Value Units Date/Time    Blood Culture #2 **CANNOT BE ORDERED STAT** [[id number]] Collected:  01/08/17 1427    Order Status:  Sent Specimen:  Blood from  Peripheral, Antecubital, Left Updated:  01/08/17 1645    Blood Culture #1 **CANNOT BE ORDERED STAT** [[id number]] Collected:  01/08/17 1422    Order Status:  Sent Specimen:  Blood from Peripheral, Hand, Right Updated:  01/08/17 1644    Urine culture [[id number]] Collected:  01/08/17 1350    Order Status:  Sent Specimen:  Urine from Urine, Catheterized Updated:  01/08/17 1604            IMAGING:     Imaging Results         US Abdomen Limited (Final result) Result time:  01/08/17 19:23:54    Final result by Ernst Haas MD (01/08/17 19:23:54)    Impression:        Biliary sludge without evidence for acute cholecystitis.  Ascites of unknown etiology for which correlation advised.         Electronically signed by: Dr. Ernst Haas MD  Date:     01/08/17  Time:    19:23     Narrative:    Comparison: None.    Findings: Limited right upper quadrant ultrasound performed.The liver measures 19.7 cm in length and is homogeneous in echogenicity apart from calcifications identified intrahepatic.  Common bile duct is upper normal limits at 0.6 cm.  Sludge identified within the gallbladder.  No pericholecystic fluid or gallbladder wall thickening.  No sonographic Bynum's sign. The visualized portions of the pancreas and abdominal aorta are within normal limits.  The right kidney is normal in length measuring 11.6cm. No right sided hydronephrosis or renal masses identified.Ascites.            X-Ray Chest AP Portable (Final result) Result time:  01/08/17 13:15:20    Final result by Dragan Roy III, MD (01/08/17 13:15:20)    Impression:     Possible basal pneumonia.      Electronically signed by: DRAGAN ROY  Date:     01/08/17  Time:    13:15     Narrative:    One view: Heart size is normal.  There is aortic plaque.  There is bibasal edema and/or atelectasis/infiltrate left worse than right.  Posterior DJD.                CONSULTS:     None       ASSESSMENT & PLAN:     Primary Diagnosis:  Generalized weakness    Active  Hospital Problems    Diagnosis  POA    *Generalized weakness [R53.1]  Yes     Priority: 1 - High    Acute on chronic renal failure [N17.9, N18.9]  Yes     Priority: 2     Acute cystitis without hematuria [N30.00]  Yes     Priority: 3     Encephalopathy, metabolic [G93.41]  Yes     Priority: 4     Hypokalemia [E87.6]  Yes     Priority: 5     Hypomagnesemia [E83.42]  Yes     Priority: 5     Chronic alcohol abuse [F10.10]  Yes     Priority: 6     Ascites due to alcoholic cirrhosis [K70.31]  Yes     Priority: 7     Elevated troponin [R79.89]  Yes     Priority: 8      Very minimal and in the setting or acute renal failure.        Resolved Hospital Problems    Diagnosis Date Resolved POA   No resolved problems to display.         Generalized weakness  · Multifactorial:  ARF, UTI, chronic alcoholism, malnutrition  · Treatment of individual problems as outlined below      Acute on chronic renal failure  Acute renal failure  · As evidenced by decrease in GFR.  Baseline creatinine = unknownn  · Will evaluate for pre-renal, intrarenal, and post-obstructive etiology.  · Obtain:  1.  protein/creatinine ratio  2. urine and serum osmolalities  3. urine electrolytes (Na, Cl, K)  4. LDH  5. Complement  6. Her's stain for osinophils  · Renal ultrasound  · Monitor with serial Cr / GFR levels closely with serial labs  · Avoid nephrotoxic medications such as NSAIDs, IV contrast, or RAAS blockade  · Nephrology consult      Acute cystitis without hematuria  Urinary tract infection, bacterial  · As evidenced by UA  · Urine culture and Gram stain obtained prior to antibiotics  · Given empiric antibiotics  · Will tailor antibiotic regimen according to culture & sensitivity results  · Maintain euvolemic status with IV fluids      Encephalopathy, metabolic  Metabolic encephalopathy  · Secondary to acute infectious process as well as chronic alcoholism w/ decreased liver funtion  · Treatment as outlined elsewhere  · Supportive  care  · Fall precautions        Hypokalemia  · Due to GI losses or poor oral intake  · Check magnesium  · Replace potassium orally if possible, if not then IV  · Monitor with serial labs        Hypomagnesemia  · Due to GI losses or poor oral intake  · Replace magnesium IV  · Check serial magnesium      Chronic alcohol abuse  · Chronic alcoholism   · Chronic tobacco use  · Initiate banana bag  · Case management consult to provide community resources for substance abuse  · Nicotine patch offered; considered Wellbutrin or Chantix through PCP as an outpatient (will require closer monitoring)  · Tobacco cessation counseling: I discussed with the patient regarding the hazardous effects of smoking on increasing risk of heart attack and stroke, worsening lung functions, and increasing cancer risk.   Patient was urged to stop smoking now.  I also offered nicotine taper (such as nicotine patch and gum) to help ease the craving to smoke.    Ascites due to alcoholic cirrhosis  · As evidenced by physical exam and lab studies  · Hepatitis panel pending  · Diagnostic paracentesis by IR      Elevated troponin  · Mild  · Likely 2nd to acute renal failure  · Will trend  · No evidence of ACS          VTE Risk Mitigation     None            Adult PRN medications available   DVT prophylaxis given       DISPOSITION:     Will admit to the Hospital Medicine service for further evaluation and treatment.    Chart reviewed and updated where applicable.    High Risk Conditions:  Patient has a condition that poses threat to life and bodily function: Acute Renal Failure      ===============================================================    Miguel Diaz MD, MPH  Department of Hospital Medicine   [facility]  [phone] pg  (7pm - 6am)          This note is dictated using Dragon Medical 360 voice recognition software.  There are word recognition mistakes that are occasionally missed on review.

## 2017-01-09 NOTE — NURSING
K+ critical value of 2.5 called to Dr Diaz.  New orders received.  Pt sitting up in bed resting quietly.  Resp even NL.  Pt in no distress.

## 2017-01-09 NOTE — PLAN OF CARE
01/09/17 1426   Discharge Assessment   Assessment Type Discharge Planning Assessment   Confirmed/corrected address and phone number on facesheet? Yes   Assessment information obtained from? Patient;Caregiver   If Healthcare Directive is received, is it scanned into Epic? no (comment)   Prior to hospitilization cognitive status: Alert/Oriented   Prior to hospitalization functional status: Assistive Equipment   Current cognitive status: Alert/Oriented   Current Functional Status: Assistive Equipment   Lives With child(crystal), adult   Able to Return to Prior Arrangements yes   Is patient able to care for self after discharge? Yes   How many people do you have in your home that can help with your care after discharge? 1   Who are your caregiver(s) and their phone number(s)? Kandace pt daughter  [phone]   Patient's perception of discharge disposition home or selfcare   Readmission Within The Last 30 Days no previous admission in last 30 days   Patient currently being followed by outpatient case management? No   Patient currently receives home health services? No   Does the patient currently use HME? Yes   Patient currently receives private duty nursing? No   Patient currently receives any other outside agency services? No   Equipment Currently Used at Home walker, rolling;cane, straight   Do you have any problems affording any of your prescribed medications? No   Is the patient taking medications as prescribed? yes   Do you have any financial concerns preventing you from receiving the healthcare you need? No   Does the patient have transportation to healthcare appointments? Yes   Transportation Available car;family or friend will provide   On Dialysis? No   Does the patient receive services at the Coumadin Clinic? No   Are there any open cases? No   Discharge Plan A Home with family   Discharge Plan B Home Health   Patient/Family In Agreement With Plan yes     TAPAN updated pt PCP and pharmacy in demographics. TAPAN  explained the role of CM and provided   Contact information by placing information on pt white board.      [facility] [employer] [facility] [address] [place] [address] EXPY AT [facility] & [address] [address]  Phone: [phone] Fax: [phone]

## 2017-01-09 NOTE — ASSESSMENT & PLAN NOTE
Metabolic encephalopathy  · Secondary to acute infectious process as well as chronic alcoholism w/ decreased liver funtion  · Treatment as outlined elsewhere  · Supportive care  · Fall precautions

## 2017-01-09 NOTE — ASSESSMENT & PLAN NOTE
· Due to GI losses or poor oral intake  · Check magnesium  · Replace potassium orally if possible, if not then IV  · Monitor with serial labs

## 2017-01-09 NOTE — PLAN OF CARE
Problem: Physical Therapy Goal  Goal: Physical Therapy Goal  Goals to be met by: 17    Patient will increase functional independence with mobility by performin. Supine to sit with supervision  2. Sit to stand transfer assess when able  3. Gait assess when able  4. Lower extremity exercise program x30 reps per handout, with supervision     Patient would benefit from PT while in the hospital in order to get back to PLOF.

## 2017-01-09 NOTE — PLAN OF CARE
Problem: Fall Risk (Adult)  Goal: Absence of Falls  Patient will demonstrate the desired outcomes by discharge/transition of care.   Outcome: Ongoing (interventions implemented as appropriate)    01/09/17 0111   Fall Risk (Adult)   Absence of Falls making progress toward outcome         Problem: Infection, Risk/Actual (Adult)  Goal: Infection Prevention/Resolution  Patient will demonstrate the desired outcomes by discharge/transition of care.   Outcome: Ongoing (interventions implemented as appropriate)    01/09/17 0111   Infection, Risk/Actual (Adult)   Infection Prevention/Resolution making progress toward outcome         Problem: Patient Care Overview  Goal: Plan of Care Review  Outcome: Ongoing (interventions implemented as appropriate)    01/09/17 0111   Coping/Psychosocial   Plan Of Care Reviewed With patient         Problem: Pressure Ulcer Risk (Gerhard Scale) (Adult,Obstetrics,Pediatric)  Goal: Skin Integrity  Patient will demonstrate the desired outcomes by discharge/transition of care.   Outcome: Ongoing (interventions implemented as appropriate)    01/09/17 0111   Pressure Ulcer Risk (Gerhard Scale) (Adult,Obstetrics,Pediatric)   Skin Integrity making progress toward outcome         Problem: Liver Failure, Acute/Chronic (Adult)  Goal: Signs and Symptoms of Listed Potential Problems Will be Absent, Minimized or Managed (Liver Failure, Acute/Chronic)  Signs and symptoms of listed potential problems will be absent, minimized or managed by discharge/transition of care (reference Liver Failure, Acute/Chronic (Adult) CPG).  Outcome: Ongoing (interventions implemented as appropriate)    01/09/17 0111   Liver Failure, Acute/Chronic   Problems Assessed (Liver Failure, Acute/Chronic) all   Problems Present (Liver Failure, Acute/Chronic) fluid/electrolyte imbalance;pain

## 2017-01-09 NOTE — PROGRESS NOTES
[facility] [place]  Adult Nutrition  Consult Note    SUMMARY     Recommendations    Recommendation/Intervention:  1. Rec add boost plus tid   2. Rec lifting Renal diet restriction with low K levels  3. RD to monitor  Goals: Pt. to meet >85% EEN  Nutrition Goal Status: new  Communication of RD Recs: reviewed with RN    Continuum of Care Plan    Referral to Outpatient Services:  (D/C plannin gm sodium diet with supplements as needed)    Reason for Assessment    Reason for Assessment: nurse/nurse practitioner consult  Diagnosis:  (UTI, general weakness)  Relevent Medical History: ETOH abuse, cirrhosis, ascities, met encephalopathy, acute on chronic renal failure, malnutrition; HTN, chrohn's disease   Interdisciplinary Rounds: did not attend     General Information Comments: Pt. reports decreased appetite and weight loss of 15-20# in past few months (Reports UBW of 180#- ). Denies issues with chewing/swallowing. Reports pain after eating. On lactulose.     Nutrition Prescription Ordered    Current Diet Order: Renal, Cardiac        Nutrition Risk Screen     Nutrition Risk Screen: unintentional loss of 10 lbs or more in the past 2 mos    Nutrition/Diet History    Patient Reported Diet/Restrictions/Preferences: general     Food Preferences: Denies cultural, Jainism, ethnic food preferences      Factors Affecting Nutritional Intake: abdominal pain, decreased appetite     Labs/Tests/Procedures/Meds     Pertinent Labs Reviewed: reviewed  Pertinent Labs Comments: K 2.5; BUN 39; Cr 2.6; alb 1.8 tBili 3.4; LFT's elevated, ammonia elevated  Pertinent Medications Reviewed: reviewed  Pertinent Medications Comments: phenergan, abx, folic acid, lactulose KCL    Physical Findings    Overall Physical Appearance: obese   Oral/Mouth Cavity: tooth/teeth missing  Skin: intact, jaundice, other (see comments) (Gerhard Wilhelm)    Anthropometrics     Height (inches): 62.01 in  Weight Method: Stated  Weight (kg): 81.2 kg     Ideal  Body Weight (IBW), Female: 110.05 lb     % Ideal Body Weight, Female (lb): 162.67 lb  BMI (kg/m2): 32.73  BMI Grade: 30 - 34.9- obesity - grade I      Weight Loss: unintentional     Estimated/Assessed Needs    Weight Used For Calorie Calculations: 81.2 kg (179 lb 0.2 oz)   Height (cm): 157.5 cm     Energy Need Method: Fair Play-St Jeor (5424-4359 kcal)     RMR (Fair Play-St. Jeor Equation): 1339.36      Weight Used For Protein Calculations: 81.2 kg (179 lb 0.2 oz)  Protein Requirements:  g    Fluid Need Method: RDA Method     Malnutrition (Undernutrition) Diagnosis    % Intake of Estimated Energy Needs: 25 - 50%  % Meal Intake: 25%     Nutrition Diagnosis    Nutrition Problem: Inadequate energy intake  Etiology/Related To: medical issues  Nutrition Diagnosis Signs/Symptoms As Evidenced By: 25% po intake/weight loss PTA  Nutrition Diagnosis Status: New    Monitor and Evaluation    Food and Nutrient Intake: energy intake  Food and Nutrient Adminstration: diet order  Anthropometric Measurements: weight, weight change  Biochemical Data, Medical Tests and Procedures: electrolyte and renal panel, glucose/endocrine profile  Nutrition-Focused Physical Findings: overall appearance    Nutrition Risk    Level of Risk:  (2 x week)    Nutrition Follow-Up    RD Follow-up?: Yes

## 2017-01-09 NOTE — ASSESSMENT & PLAN NOTE
Urinary tract infection, bacterial  · As evidenced by UA  · Urine culture and Gram stain obtained prior to antibiotics  · Given empiric antibiotics  · Will tailor antibiotic regimen according to culture & sensitivity results  · Maintain euvolemic status with IV fluids

## 2017-01-10 LAB
ALBUMIN SERPL BCP-MCNC: 1.8 G/DL
ALP SERPL-CCNC: 393 U/L
ALT SERPL W/O P-5'-P-CCNC: 38 U/L
ANION GAP SERPL CALC-SCNC: 14 MMOL/L
AST SERPL-CCNC: 118 U/L
BACTERIA UR CULT: NORMAL
BASOPHILS # BLD AUTO: 0.02 K/UL
BASOPHILS NFR BLD: 0.3 %
BILIRUB SERPL-MCNC: 2.7 MG/DL
BUN SERPL-MCNC: 37 MG/DL
CALCIUM SERPL-MCNC: 8.4 MG/DL
CHLORIDE SERPL-SCNC: 103 MMOL/L
CO2 SERPL-SCNC: 25 MMOL/L
CREAT SERPL-MCNC: 2.4 MG/DL
DIFFERENTIAL METHOD: ABNORMAL
EOSINOPHIL # BLD AUTO: 0.2 K/UL
EOSINOPHIL NFR BLD: 3 %
ERYTHROCYTE [DISTWIDTH] IN BLOOD BY AUTOMATED COUNT: 15.3 %
EST. GFR  (AFRICAN AMERICAN): 25 ML/MIN/1.73 M^2
EST. GFR  (NON AFRICAN AMERICAN): 21 ML/MIN/1.73 M^2
GLUCOSE SERPL-MCNC: 100 MG/DL
HCT VFR BLD AUTO: 27.3 %
HGB BLD-MCNC: 9.3 G/DL
LYMPHOCYTES # BLD AUTO: 1 K/UL
LYMPHOCYTES NFR BLD: 16.4 %
MCH RBC QN AUTO: 30.9 PG
MCHC RBC AUTO-ENTMCNC: 34.1 %
MCV RBC AUTO: 91 FL
MONOCYTES # BLD AUTO: 0.8 K/UL
MONOCYTES NFR BLD: 13 %
NEUTROPHILS # BLD AUTO: 4 K/UL
NEUTROPHILS NFR BLD: 67.3 %
PLATELET # BLD AUTO: 114 K/UL
PMV BLD AUTO: 10.8 FL
POTASSIUM SERPL-SCNC: 2.6 MMOL/L
PROT SERPL-MCNC: 6.3 G/DL
RBC # BLD AUTO: 3.01 M/UL
SODIUM SERPL-SCNC: 142 MMOL/L
WBC # BLD AUTO: 5.91 K/UL

## 2017-01-10 PROCEDURE — 21400001 HC TELEMETRY ROOM

## 2017-01-10 PROCEDURE — G8988 SELF CARE GOAL STATUS: HCPCS | Mod: CK

## 2017-01-10 PROCEDURE — 25000003 PHARM REV CODE 250: Performed by: EMERGENCY MEDICINE

## 2017-01-10 PROCEDURE — 85025 COMPLETE CBC W/AUTO DIFF WBC: CPT

## 2017-01-10 PROCEDURE — 97165 OT EVAL LOW COMPLEX 30 MIN: CPT

## 2017-01-10 PROCEDURE — 94761 N-INVAS EAR/PLS OXIMETRY MLT: CPT

## 2017-01-10 PROCEDURE — 25000003 PHARM REV CODE 250: Performed by: HOSPITALIST

## 2017-01-10 PROCEDURE — 97116 GAIT TRAINING THERAPY: CPT

## 2017-01-10 PROCEDURE — 80053 COMPREHEN METABOLIC PANEL: CPT

## 2017-01-10 PROCEDURE — 97110 THERAPEUTIC EXERCISES: CPT

## 2017-01-10 PROCEDURE — 27000221 HC OXYGEN, UP TO 24 HOURS

## 2017-01-10 PROCEDURE — G8987 SELF CARE CURRENT STATUS: HCPCS | Mod: CL

## 2017-01-10 PROCEDURE — 36415 COLL VENOUS BLD VENIPUNCTURE: CPT

## 2017-01-10 PROCEDURE — 63600175 PHARM REV CODE 636 W HCPCS: Performed by: EMERGENCY MEDICINE

## 2017-01-10 RX ORDER — POTASSIUM CHLORIDE 20 MEQ/1
40 TABLET, EXTENDED RELEASE ORAL EVERY 4 HOURS
Status: COMPLETED | OUTPATIENT
Start: 2017-01-10 | End: 2017-01-10

## 2017-01-10 RX ADMIN — CEFTRIAXONE 2 G: 2 INJECTION, SOLUTION INTRAVENOUS at 02:01

## 2017-01-10 RX ADMIN — LACTULOSE 30 G: 10 SOLUTION ORAL at 02:01

## 2017-01-10 RX ADMIN — RIFAXIMIN 550 MG: 550 TABLET ORAL at 09:01

## 2017-01-10 RX ADMIN — RIFAXIMIN 550 MG: 550 TABLET ORAL at 08:01

## 2017-01-10 RX ADMIN — Medication 3 ML: at 02:01

## 2017-01-10 RX ADMIN — LACTULOSE 30 G: 10 SOLUTION ORAL at 05:01

## 2017-01-10 RX ADMIN — POTASSIUM CHLORIDE 40 MEQ: 1500 TABLET, EXTENDED RELEASE ORAL at 02:01

## 2017-01-10 RX ADMIN — POTASSIUM CHLORIDE 40 MEQ: 1500 TABLET, EXTENDED RELEASE ORAL at 09:01

## 2017-01-10 RX ADMIN — RAMELTEON 8 MG: 8 TABLET, FILM COATED ORAL at 09:01

## 2017-01-10 RX ADMIN — Medication 3 ML: at 06:01

## 2017-01-10 RX ADMIN — FOLIC ACID 1 MG: 1 TABLET ORAL at 08:01

## 2017-01-10 NOTE — PLAN OF CARE
Problem: Fall Risk (Adult)  Goal: Absence of Falls  Patient will demonstrate the desired outcomes by discharge/transition of care.   Outcome: Ongoing (interventions implemented as appropriate)    01/09/17 2339   Fall Risk (Adult)   Absence of Falls making progress toward outcome         Problem: Infection, Risk/Actual (Adult)  Goal: Infection Prevention/Resolution  Patient will demonstrate the desired outcomes by discharge/transition of care.   Outcome: Ongoing (interventions implemented as appropriate)    01/09/17 2339   Infection, Risk/Actual (Adult)   Infection Prevention/Resolution making progress toward outcome         Problem: Patient Care Overview  Goal: Plan of Care Review  Outcome: Ongoing (interventions implemented as appropriate)    01/09/17 2339   Coping/Psychosocial   Plan Of Care Reviewed With patient         Problem: Liver Failure, Acute/Chronic (Adult)  Goal: Signs and Symptoms of Listed Potential Problems Will be Absent, Minimized or Managed (Liver Failure, Acute/Chronic)  Signs and symptoms of listed potential problems will be absent, minimized or managed by discharge/transition of care (reference Liver Failure, Acute/Chronic (Adult) CPG).   Outcome: Ongoing (interventions implemented as appropriate)    01/09/17 2339   Liver Failure, Acute/Chronic   Problems Assessed (Liver Failure, Acute/Chronic) all   Problems Present (Liver Failure, Acute/Chronic) fluid/electrolyte imbalance

## 2017-01-10 NOTE — PROGRESS NOTES
[facility] Medicine  Progress Note    Patient Name: Tana Brewster  MRN: [id number]  Patient Class: IP- Inpatient   Admission Date: 1/8/2017  Length of Stay: 2 days  Expected Discharge Date:   Attending Physician: Rupali Birmingham MD  Primary Care Provider: Pablo Browne MD        Subjective:     Principal Problem:Generalized weakness    HPI:  Tana Brewster is a 61 y.o. female that  has a past medical history of Crohn disease and Hypertension. Presents to [facility] complaining of generalized weakness.  She has a long-standing history of chronic alcoholism.  She states she has had subacute onset of generalized weakness that is progressively worsened over the last week.  Her symptoms are constant.  She thinks they are possibly exacerbated by decreased appetite and oral intake.  Associated symptoms include ataxia and bilateral lower extremity weakness.  Associated symptoms also include intermittent dysuria and she feels like she has a urinary tract infection.  She denies abdominal pain but does have some suprapubic discomfort.  She had a normal bowel movement this morning and denies melena, hematochezia, hematemesis, nausea, vomiting, chest pain, shortness of breath, syncope, vertigo, or tremors, fever, chills, or skin lesions or infections.    In the emergency department routine laboratory studies were obtained which demonstrated multiple abnormalities including evidence of urinary tract infection and electrolyte abnormalities.      Hospital medicine has been asked to admit for further evaluation and treatment.         Hospital Course:  62 y/o with cirrhosis due to ETOH abuse who was admitted for encephalopathy, UTI and electrolyte abnormalities. Pt was treated with Rocephin for UTI and had ascites evaluated/treated with paracentesis, and mentation slowly improving.    Interval History: Pt report feeling better,     Review of Systems   Constitutional:  Negative for chills and fever.   Eyes: Negative for visual disturbance.   Respiratory: Negative for shortness of breath.    Cardiovascular: Negative for chest pain.     Objective:     Vital Signs (Most Recent):  Temp: 97.8 °F (36.6 °C) (01/09/17 2320)  Pulse: 83 (01/09/17 2320)  Resp: 16 (01/09/17 2320)  BP: (!) 100/55 (01/09/17 2320)  SpO2: (!) 94 % (01/09/17 2320) Vital Signs (24h Range):  Temp:  [97.7 °F (36.5 °C)-98.4 °F (36.9 °C)] 97.8 °F (36.6 °C)  Pulse:  [76-84] 83  Resp:  [16-18] 16  BP: ()/(41-62) 100/55     Weight: 81.2 kg (179 lb)  Body mass index is 32.74 kg/(m^2).    Intake/Output Summary (Last 24 hours) at 01/09/17 2350  Last data filed at 01/09/17 1726   Gross per 24 hour   Intake          1511.67 ml   Output                0 ml   Net          1511.67 ml      Physical Exam   Constitutional:   Chronically ill appearing.   HENT:   Head: Normocephalic and atraumatic.   Eyes: EOM are normal. Pupils are equal, round, and reactive to light.   Neck: Normal range of motion. Neck supple.   Cardiovascular: Normal rate and regular rhythm.    Pulmonary/Chest: Effort normal and breath sounds normal.   Abdominal:   Soft but distended, NT, no rebound or guarding, +ascites, +BS.   Musculoskeletal: Normal range of motion.   Neurological:   Awake and alert, oriented x 3 (years0.   Skin: Skin is warm and dry.       Significant Labs: All pertinent labs within the past 24 hours have been reviewed.    Significant Imaging: I have reviewed and interpreted all pertinent imaging results/findings within the past 24 hours.    Assessment/Plan:      * Generalized weakness  Multifactorial due  To ARF, UTI, chronic alcoholism, malnutrition. Will treat underlying conditions and get PT/OT.      Acute on chronic renal failure  Possible prerenal etiology vs hepatorenal, improving with IVF, continue current care and monitor.    Hypokalemia  Replace judiciously given ARF, monitor and follow Mg level as  well.      Hypomagnesemia  Repleted and monitor.      Encephalopathy, metabolic  Due to infectious process vs hepatic vs ETOH. Improving nicely, will check ammonia level, treat underlying conditions and follow closely.      Ascites due to alcoholic cirrhosis  Paracentesis by IR today.      Acute cystitis without hematuria  Treat with empiric Rocephin and f/u cultures.      Chronic alcohol abuse  Pt reports she has stopped drinking for the past few weeks, maybe up to 2 months. Continued sobriety encouraged.    Elevated troponin  Followed markers, not consistent to ACS.      VTE Risk Mitigation         Ordered     Medium Risk of VTE  Once      01/08/17 2145     Place sequential compression device  Until discontinued      01/08/17 2145     Place SARAH hose  Until discontinued      01/08/17 2145          Rupali Birmingham MD  Department of Hospital Medicine   [facility] [place]

## 2017-01-10 NOTE — PT/OT/SLP PROGRESS
Physical Therapy  Treatment    Tana Brewster   MRN: [id number]   Admitting Diagnosis: Generalized weakness    PT Received On: 01/10/17  PT Start Time: 1219     PT Stop Time: 1242    PT Total Time (min): 23 min       Billable Minutes:  Gait Training 13 and Therapeutic Exercise 10    Treatment Type: Treatment  PT/PTA: PT     PTA Visit Number: 0       General Precautions: Standard, fall  Orthopedic Precautions: Full weight bearing   Braces: N/A    Subjective:  Communicated with nurse Lory prior to session.  Patient agreeable to participate in session. Patient still with complaints of being tired. Family educated that patient needs encouragement to move throughout the day and not just lay in bed.     Pain Rating: 10/10 (neck pain with patient requesting to lay back down because her neck hurt to bad being unsupported)  Location: neck  Pain Addressed: Reposition, Cessation of Activity, Nurse notified    Objective:   Patient found with: peripheral IV, telemetry, oxygen, pulse ox (continuous) (1L NC O2)    Functional Mobility:  Bed Mobility:   Supine to Sit: Minimum Assistance, With side rail    Transfers:  Sit <> Stand Assistance: Contact Guard Assistance, Minimum Assistance (x2 trials from bed with encouragement to perform )  Sit <> Stand Assistive Device: Rolling Walker    Gait:   Gait Distance: ~4 steps from bed to bedside chair with encouragement provided   Assistance 1: Moderate assistance  Gait Assistive Device: Rolling walker  Gait Pattern: 3-point gait  Gait Deviation(s): decreased maverick, increased time in double stance, decreased velocity of limb motion, decreased step length, decreased toe-to-floor clearance, decreased weight-shifting ability    Balance:   Static Sit: FAIR+: Able to take MINIMAL challenges from all directions  Dynamic Sit: FAIR+: Maintains balance through MINIMAL excursions of active trunk motion  Static Stand: FAIR: Maintains without assist but unable to take challenges Patient performed B ASHTYN  standing marches x4 reps with posterior lean and frequent loss of balance using RW and mod A needed to maintain upright posture.   Dynamic stand: POOR: N/A     Therapeutic Activities and Exercises:  Patient educated in and performed B LE seated therex while in bedside chair x10 reps for A/P, LAQ, hip flex, and pillow squeezes.     AM-PAC 6 CLICK MOBILITY  How much help from another person does this patient currently need?   1 = Unable, Total/Dependent Assistance  2 = A lot, Maximum/Moderate Assistance  3 = A little, Minimum/Contact Guard/Supervision  4 = None, Modified Harris/Independent    Turning over in bed (including adjusting bedclothes, sheets and blankets)?: 3  Sitting down on and standing up from a chair with arms (e.g., wheelchair, bedside commode, etc.): 3  Moving from lying on back to sitting on the side of the bed?: 3  Moving to and from a bed to a chair (including a wheelchair)?: 3  Need to walk in hospital room?: 3  Climbing 3-5 steps with a railing?: 2  Total Score: 17    AM-PAC Raw Score CMS G-Code Modifier Level of Impairment Assistance   6 % Total / Unable   7 - 9 CM 80 - 100% Maximal Assist   10 - 14 CL 60 - 80% Moderate Assist   15 - 19 CK 40 - 60% Moderate Assist   20 - 22 CJ 20 - 40% Minimal Assist   23 CI 1-20% SBA / CGA   24 CH 0% Independent/ Mod I     Patient left up in chair with all lines intact, call button in reach and nurse Lory notified.    Assessment:  Tana Brewster is a 61 y.o. female with a medical diagnosis of Generalized weakness and presents with decreased strength and impaired balance for functional mobility. She would continue to benefit from PT while in the hospital in order to address deficits listed below and get patient back to PLOF.     Rehab identified problem list/impairments: Rehab identified problem list/impairments: weakness, impaired endurance, impaired functional mobilty, gait instability, impaired balance, decreased lower extremity function, decreased  safety awareness, pain    Rehab potential is fair.    Activity tolerance: Fair    Discharge recommendations: Discharge Facility/Level Of Care Needs: rehabilitation facility     Barriers to discharge: Barriers to Discharge: Decreased caregiver support    Equipment recommendations: Equipment Needed After Discharge: bedside commode     GOALS:   Physical Therapy Goals        Problem: Physical Therapy Goal    Goal Priority Disciplines Outcome Goal Variances Interventions   Physical Therapy Goal     PT/OT, PT Ongoing (interventions implemented as appropriate)     Description:  Goals to be met by: 17    Patient will increase functional independence with mobility by performin. Supine to sit with supervision  2. Sit to stand transfer supervision with RW  3. Gait 50ft with RW and supervision  4. Lower extremity exercise program x30 reps per handout, with supervision               PLAN:    Patient to be seen 6 x/week  to address the above listed problems via gait training, therapeutic activities, therapeutic exercises  Plan of Care expires: 17  Plan of Care reviewed with: patient    Rashmi Mendes, PT, MOT  01/10/2017

## 2017-01-10 NOTE — PLAN OF CARE
Problem: Physical Therapy Goal  Goal: Physical Therapy Goal  Goals to be met by: 17    Patient will increase functional independence with mobility by performin. Supine to sit with supervision  2. Sit to stand transfer supervision with RW  3. Gait 50ft with RW and supervision  4. Lower extremity exercise program x30 reps per handout, with supervision   Outcome: Ongoing (interventions implemented as appropriate)     Patient tolerated getting in bedside chair with encouragement needed throughout PT session.

## 2017-01-10 NOTE — ASSESSMENT & PLAN NOTE
Pt reports she has stopped drinking for the past few weeks, maybe up to 2 months. Continued sobriety encouraged.

## 2017-01-10 NOTE — SUBJECTIVE & OBJECTIVE
Interval History: Pt report feeling better,     Review of Systems   Constitutional: Negative for chills and fever.   Eyes: Negative for visual disturbance.   Respiratory: Negative for shortness of breath.    Cardiovascular: Negative for chest pain.     Objective:     Vital Signs (Most Recent):  Temp: 97.8 °F (36.6 °C) (01/09/17 2320)  Pulse: 83 (01/09/17 2320)  Resp: 16 (01/09/17 2320)  BP: (!) 100/55 (01/09/17 2320)  SpO2: (!) 94 % (01/09/17 2320) Vital Signs (24h Range):  Temp:  [97.7 °F (36.5 °C)-98.4 °F (36.9 °C)] 97.8 °F (36.6 °C)  Pulse:  [76-84] 83  Resp:  [16-18] 16  BP: ()/(41-62) 100/55     Weight: 81.2 kg (179 lb)  Body mass index is 32.74 kg/(m^2).    Intake/Output Summary (Last 24 hours) at 01/09/17 2350  Last data filed at 01/09/17 1726   Gross per 24 hour   Intake          1511.67 ml   Output                0 ml   Net          1511.67 ml      Physical Exam   Constitutional:   Chronically ill appearing.   HENT:   Head: Normocephalic and atraumatic.   Eyes: EOM are normal. Pupils are equal, round, and reactive to light.   Neck: Normal range of motion. Neck supple.   Cardiovascular: Normal rate and regular rhythm.    Pulmonary/Chest: Effort normal and breath sounds normal.   Abdominal:   Soft but distended, NT, no rebound or guarding, +ascites, +BS.   Musculoskeletal: Normal range of motion.   Neurological:   Awake and alert, oriented x 3 (years0.   Skin: Skin is warm and dry.       Significant Labs: All pertinent labs within the past 24 hours have been reviewed.    Significant Imaging: I have reviewed and interpreted all pertinent imaging results/findings within the past 24 hours.

## 2017-01-10 NOTE — NURSING
Pt sitting up in bed resting quietly.  Resp even NL.  Pt denies pain/sob.  Pt says she just feels tired and wants to get some sleep.  O2 sats 97% on 2L NC.  Abdomen soft and distended.  Dressing to abdomen dry and intact.  Plan of care discussed with pt.  Diaper changed, pt had a small BM and urinated.  Advised pt to call for assistance.  Call light in reach, bed alarm on.

## 2017-01-10 NOTE — ASSESSMENT & PLAN NOTE
Due to infectious process vs hepatic vs ETOH. Improving nicely, will check ammonia level, treat underlying conditions and follow closely.

## 2017-01-10 NOTE — PHYSICIAN QUERY
"PT Name: Tana Brewster  MR #: [id number]     Physician Query Form - Documentation Clarification    Reviewer  Ext [phone]  Zainab Eason    This form is a permanent document in the medical record.     Query Date: January 10, 2017  By submitting this query, we are merely seeking further clarification of documentation to reflect the severity of illness of your patient. Please utilize your independent clinical judgment when addressing the question(s) below.    (The Medical record reflects the following:)      Supporting Clinical Findings Location in Medical Record   Malnutrition    Ascites due to alcoholic cirrhosis   Chronic alcohol abuse   Hypomagnesemia  Hypokalemia  Acute on Chronic Renal Failure  Acute Cystitis w/o hematuria, UTI-bacterial  Metabolic Encephalopathy   H&P   Recommendation/Intervention:  1. Rec add boost plus tid   2. Rec lifting Renal diet restriction with low K levels  3. RD to monitor    Pt. reports decreased appetite and weight loss of 15-20# in past few months (Reports UBW of 180#- ).     On Lactulose    Nutrition Risk Screen: unintentional loss of 10 lbs or more in the past 2 mos     Overall Physical Appearance: obese    Ht: 62.01 in  Wt: 81.2kg  BMI: 32.73  BMI Grade: 30 - 34.9- obesity - grade I    Malnutrition (Undernutrition) Diagnosis  % Intake of Estimated Energy Needs: 25 - 50%  % Meal Intake: 25%    Nutrition Problem: Inadequate energy intake  Etiology/Related To: medical issues  Nutrition Diagnosis Signs/Symptoms As Evidenced By: 25% po intake/weight loss PTA   RD PN 1/9                                                                            Doctor, Please specify degree of "malnutrition"    (  ) Mild  (  ) Moderate  ( x ) Severe  (  ) Other: __________________  (  ) Undeterminable    Physician Use Only                                                                                                                               [  ] Unable to determine            "

## 2017-01-10 NOTE — PT/OT/SLP EVAL
"Occupational Therapy  Evaluation    Tana Brewster   MRN: [id number]   Admitting Diagnosis: Generalized weakness    OT Date of Treatment: 01/10/17   OT Start Time: 1219  OT Stop Time: 1235  OT Total Time (min): 16 min    Billable Minutes:  Evaluation 16 minutes (with PT treatment)    Diagnosis: Generalized weakness       Past Medical History   Diagnosis Date    Crohn disease     Hypertension       History reviewed. No pertinent past surgical history.    Referring physician: Dr. [place]  Date referred to OT: 1/9/2017    General Precautions: Standard, fall  Orthopedic Precautions: Full weight bearing  Braces: N/A    Do you have any cultural, spiritual, Yazidi conflicts, given your current situation?: no     Patient History:  Living Environment  Lives With: child(crystal), adult (daughter)  Living Arrangements: house  Number of Stairs to Enter Home: 6  Stair Railings at Home: outside, present on right side  Transportation Available: car, family or friend will provide  Living Environment Comment: Per patient and family, patient has been getting weak for the past month  Equipment Currently Used at Home: cane, straight, walker, rolling, shower chair    Prior level of function:   Bed Mobility/Transfers: needs device and assist  Grooming: needs device and assist  Bathing: needs device and assist  Upper Body Dressing: needs device and assist  Lower Body Dressing: needs device and assist  Toileting: needs device and assist        Dominant hand: right    Subjective:  Communicated with nurse Henley prior to session.    Chief Complaint: "I can't feed myself, I'm too weak."  Patient/Family stated goals: to get better    Pain Rating: 10/10 (Patient c/o neck pain while seated, stating that it hurt from having to hold her head up.)        Location: neck  Pain Addressed: Reposition, Cessation of Activity, Nurse notified       Objective:  Patient found with: peripheral IV, telemetry, oxygen, pulse ox (continuous) (1L O2 NC)    Cognitive " Exam:  Oriented to: Person and Situation  Follows Commands/attention: Follows one-step commands  Communication: clear/fluent  Memory:  No Deficits noted  Safety awareness/insight to disability: intact  Coping skills/emotional control: Appropriate to situation, Labile and Despondent    Visual/perceptual:  Intact    Physical Exam:  Postural examination/scapula alignment: No postural abnormalities identified  Skin integrity: Visible skin intact  Edema: None noted     Sensation:   Intact    Upper Extremity Range of Motion:  Right Upper Extremity: WFL  Left Upper Extremity: WFL    Upper Extremity Strength:  Right Upper Extremity: WFL  Left Upper Extremity: WFL   Strength: WFL    Fine motor coordination:   Intact    Gross motor coordination: WFL    Functional Mobility:  Bed Mobility:  Rolling/Turning to Left: Minimum assistance  Scooting/Bridging: Minimum Assistance  Supine to Sit: Maximum Assistance    Transfers:  Sit <> Stand Assistance: Contact Guard Assistance  Sit <> Stand Assistive Device: Rolling Walker  Bed <> Chair Transfer Assistance: Contact Guard Assistance  Bed <> Chair Assistive Device: Rolling Walker    Functional Ambulation: took a few steps to bedside chair with PT and RW    Activities of Daily Living:  Feeding Level of Assistance: Moderate assistance  UE Dressing Level of Assistance: Moderate assistance  LE Dressing Level of Assistance: Total assistance    Balance:   Static Sit: FAIR: Maintains without assist, but unable to take any challenges   Dynamic Sit: FAIR+: Maintains balance through MINIMAL excursions of active trunk motion  Static Stand: FAIR: Maintains without assist but unable to take challenges  Dynamic stand: FAIR: Needs CONTACT GUARD during gait    AM-PAC 6 CLICK ADL  How much help from another person does this patient currently need?  1 = Unable, Total/Dependent Assistance  2 = A lot, Maximum/Moderate Assistance  3 = A little, Minimum/Contact Guard/Supervision  4 = None, Modified  "[place]/Independent    Putting on and taking off regular lower body clothing? : 2  Bathing (including washing, rinsing, drying)?: 2  Toileting, which includes using toilet, bedpan, or urinal? : 2  Putting on and taking off regular upper body clothing?: 3  Taking care of personal grooming such as brushing teeth?: 2  Eating meals?: 2  Total Score: 13    AM-PAC Raw Score CMS "G-Code Modifier Level of Impairment Assistance   6 % Total / Unable   7 - 9 CM 80 - 100% Maximal Assist   10 - 14 CL 60 - 80% Moderate Assist   15 - 19 CK 40 - 60% Moderate Assist   20 - 22 CJ 20 - 40% Minimal Assist   23 CI 1-20% SBA / CGA   24 CH 0% Independent/ Mod I       Patient left up in chair with all lines intact, call button in reach and nurse Lory notified    Assessment:  Tana Brewster is a 61 y.o. female with a medical diagnosis of Generalized weakness and presents with  independence for self-care and functional transfers..    Rehab identified problem list/impairments: Rehab identified problem list/impairments: weakness, impaired endurance, impaired self care skills, impaired functional mobilty, impaired balance, decreased upper extremity function, decreased safety awareness, pain, impaired coordination, impaired fine motor, impaired cardiopulmonary response to activity    Rehab potential is good.    Activity tolerance: Good    Discharge recommendations: Discharge Facility/Level Of Care Needs: rehabilitation facility     Barriers to discharge: Barriers to Discharge: Decreased caregiver support    Equipment recommendations: bedside commode     GOALS:   Occupational Therapy Goals        Problem: Occupational Therapy Goal    Goal Priority Disciplines Outcome Interventions   Occupational Therapy Goal     OT, PT/OT Ongoing (interventions implemented as appropriate)              PLAN:  Patient to be seen 5 x/week to address the above listed problems via self-care/home management, therapeutic activities, therapeutic " exercises  Plan of Care expires: 01/17/17  Plan of Care reviewed with: patient, mother, sibling    OT G-codes  Functional Assessment Tool Used: [facility]  Score: 13  Functional Limitation: Self care  Self Care Current Status (): CL  Self Care Goal Status (): DULCE Alas, MS  01/10/2017

## 2017-01-10 NOTE — PLAN OF CARE
Problem: Occupational Therapy Goal  Goal: Occupational Therapy Goal  Outcome: Ongoing (interventions implemented as appropriate)  Patient tolerated evaluation well, fair participation.  Patient will benefit from skilled OT services to address the above mentioned goals. DULCE Roper, MS

## 2017-01-10 NOTE — ASSESSMENT & PLAN NOTE
Multifactorial due  To ARF, UTI, chronic alcoholism, malnutrition. Will treat underlying conditions and get PT/OT.

## 2017-01-10 NOTE — PHYSICIAN QUERY
"PT Name: Tana Brewster  MR #: [id number]     Physician Query Form - Documentation Clarification    Reviewer  Ext [phone]  Zainab Eason    This form is a permanent document in the medical record.     Query Date: January 10, 2017  By submitting this query, we are merely seeking further clarification of documentation to reflect the severity of illness of your patient. Please utilize your independent clinical judgment when addressing the question(s) below.    (The Medical record reflects the following:)      Supporting Clinical Findings Location in Medical Record   Generalized weakness likely multifactorial including deconditioning/malnutrition, and liver failure resulting ascites, significant electrolyte abnormalities including potassium and magnesium, as well as ongoing UTI   ED MD Note   Ascites due to alcoholic cirrhosis   Chronic alcohol abuse   Hypomagnesemia  Hypokalemia  Acute on Chronic Renal Failure  Acute Cystitis w/o hematuria, UTI-bacterial  Metabolic Encephalopathy  Malnutrition   H&P       1/8/2017 13:25 1/8/2017 20:36 1/9/2017 02:56 1/10/2017 05:46    (H)  102 (H) 118 (H)   ALT 47 (H)  38 38   Ammonia  65 (H)            Lab     Lactulose 30g 3 times daily    Home Meds:   Lipitor  Nexium  Folvite  Hydrodiuril  Vistaril  Zoloft  Desyrel   Inpatient MAR            Home Meds List                                                                            Doctor, Please specify acuity of "Liver Failure"    (  ) Acute  (  ) Acute and Chronic  ( X ) Chronic  (  ) Other: _________________  (  ) Undeterminable    Physician Use Only                                                                                                                               [  ] Unable to determine            "

## 2017-01-11 LAB
ALBUMIN SERPL BCP-MCNC: 1.8 G/DL
ALP SERPL-CCNC: 404 U/L
ALT SERPL W/O P-5'-P-CCNC: 42 U/L
ANION GAP SERPL CALC-SCNC: 11 MMOL/L
AST SERPL-CCNC: 139 U/L
BASOPHILS # BLD AUTO: 0.04 K/UL
BASOPHILS NFR BLD: 0.7 %
BILIRUB SERPL-MCNC: 2.3 MG/DL
BUN SERPL-MCNC: 35 MG/DL
CALCIUM SERPL-MCNC: 8.8 MG/DL
CHLORIDE SERPL-SCNC: 104 MMOL/L
CO2 SERPL-SCNC: 25 MMOL/L
CREAT SERPL-MCNC: 2.2 MG/DL
DIFFERENTIAL METHOD: ABNORMAL
EOSINOPHIL # BLD AUTO: 0.1 K/UL
EOSINOPHIL NFR BLD: 1.3 %
ERYTHROCYTE [DISTWIDTH] IN BLOOD BY AUTOMATED COUNT: 15.7 %
EST. GFR  (AFRICAN AMERICAN): 27 ML/MIN/1.73 M^2
EST. GFR  (NON AFRICAN AMERICAN): 24 ML/MIN/1.73 M^2
GLUCOSE SERPL-MCNC: 98 MG/DL
HCT VFR BLD AUTO: 27.3 %
HGB BLD-MCNC: 9.3 G/DL
LYMPHOCYTES # BLD AUTO: 1 K/UL
LYMPHOCYTES NFR BLD: 16.9 %
MCH RBC QN AUTO: 30.9 PG
MCHC RBC AUTO-ENTMCNC: 34.1 %
MCV RBC AUTO: 91 FL
MONOCYTES # BLD AUTO: 0.7 K/UL
MONOCYTES NFR BLD: 11.5 %
NEUTROPHILS # BLD AUTO: 4.1 K/UL
NEUTROPHILS NFR BLD: 69.4 %
PLATELET # BLD AUTO: 126 K/UL
PMV BLD AUTO: 11.3 FL
POTASSIUM SERPL-SCNC: 3 MMOL/L
PROT SERPL-MCNC: 6.3 G/DL
RBC # BLD AUTO: 3.01 M/UL
SODIUM SERPL-SCNC: 140 MMOL/L
WBC # BLD AUTO: 5.93 K/UL

## 2017-01-11 PROCEDURE — 21400001 HC TELEMETRY ROOM

## 2017-01-11 PROCEDURE — 25000003 PHARM REV CODE 250: Performed by: EMERGENCY MEDICINE

## 2017-01-11 PROCEDURE — 97530 THERAPEUTIC ACTIVITIES: CPT

## 2017-01-11 PROCEDURE — 97116 GAIT TRAINING THERAPY: CPT

## 2017-01-11 PROCEDURE — 25000003 PHARM REV CODE 250: Performed by: HOSPITALIST

## 2017-01-11 PROCEDURE — 97110 THERAPEUTIC EXERCISES: CPT

## 2017-01-11 PROCEDURE — 36415 COLL VENOUS BLD VENIPUNCTURE: CPT

## 2017-01-11 PROCEDURE — 63600175 PHARM REV CODE 636 W HCPCS: Performed by: EMERGENCY MEDICINE

## 2017-01-11 PROCEDURE — 85025 COMPLETE CBC W/AUTO DIFF WBC: CPT

## 2017-01-11 PROCEDURE — 80053 COMPREHEN METABOLIC PANEL: CPT

## 2017-01-11 PROCEDURE — 97535 SELF CARE MNGMENT TRAINING: CPT

## 2017-01-11 RX ORDER — POTASSIUM CHLORIDE 20 MEQ/1
40 TABLET, EXTENDED RELEASE ORAL EVERY 4 HOURS
Status: COMPLETED | OUTPATIENT
Start: 2017-01-11 | End: 2017-01-11

## 2017-01-11 RX ADMIN — POTASSIUM CHLORIDE 40 MEQ: 1500 TABLET, EXTENDED RELEASE ORAL at 01:01

## 2017-01-11 RX ADMIN — RIFAXIMIN 550 MG: 550 TABLET ORAL at 09:01

## 2017-01-11 RX ADMIN — FOLIC ACID 1 MG: 1 TABLET ORAL at 09:01

## 2017-01-11 RX ADMIN — LACTULOSE 30 G: 10 SOLUTION ORAL at 05:01

## 2017-01-11 RX ADMIN — CEFTRIAXONE 2 G: 2 INJECTION, SOLUTION INTRAVENOUS at 03:01

## 2017-01-11 RX ADMIN — Medication 3 ML: at 10:01

## 2017-01-11 RX ADMIN — CEFTRIAXONE 2 G: 2 INJECTION, SOLUTION INTRAVENOUS at 02:01

## 2017-01-11 RX ADMIN — Medication 3 ML: at 06:01

## 2017-01-11 RX ADMIN — POTASSIUM CHLORIDE 40 MEQ: 1500 TABLET, EXTENDED RELEASE ORAL at 09:01

## 2017-01-11 RX ADMIN — Medication 3 ML: at 01:01

## 2017-01-11 RX ADMIN — LACTULOSE 30 G: 10 SOLUTION ORAL at 09:01

## 2017-01-11 RX ADMIN — LACTULOSE 30 G: 10 SOLUTION ORAL at 01:01

## 2017-01-11 NOTE — PT/OT/SLP PROGRESS
"Physical Therapy  Treatment    Tanajayshree Brewster   MRN: [id number]   Admitting Diagnosis: Generalized weakness    PT Received On: 17  PT Start Time: 1148     PT Stop Time: 1212    PT Total Time (min): 24 min       Billable Minutes:  Gait [id number] and Therapeutic Exercise 14    Treatment Type: Treatment  PT/PTA: PTA     PTA Visit Number: 1       General Precautions: Standard, fall  Orthopedic Precautions: N/A   Braces: N/A         Subjective:  Communicated with nurse Derrick prior to session.  Pt agreeable to therapy with max encouragement, " I feel very weak "  Pt's mother present.   Pain Ratin/10              Pain Rating Post-Intervention: 0/10    Objective:   Patient found with: pulse ox (continuous), telemetry, oxygen    Functional Mobility:  Bed Mobility:    pt found sitting up in bedside chair.     Transfers: from bedside chair. V/T cues for safety technique and walker management.   Sit <> Stand Assistance: Moderate Assistance  Sit <> Stand Assistive Device: Rolling Walker    Gait:   Gait Distance: ~ 6 steps fwd and 2 steps bwd. Required max encouragement  during gait . V/T cues for safety technique and walker management. Max slow maverick.   Assistance 1: Moderate assistance  Gait Assistive Device: Rolling walker  Gait Pattern: 3-point gait  Gait Deviation(s): decreased maverick, increased time in double stance, decreased velocity of limb motion, decreased step length, decreased swing-to-stance ratio, decreased toe-to-floor clearance    Balance:   Static Sit: FAIR: Maintains without assist, but unable to take any challenges   Dynamic Sit: FAIR: Cannot move trunk without losing balance  Static Stand: POOR+: Needs MINIMAL assist to maintain  Dynamic stand: POOR: N/A     Therapeutic Activities and Exercises:  Pt performed seated BLE X 10-15 reps : heel/toes raise, LAQ, HS, hip flexion , pillow squeezes. VC's for sequence and proper . Pt tolerated well.     AM-PAC 6 CLICK MOBILITY  How much help from another " person does this patient currently need?   1 = Unable, Total/Dependent Assistance  2 = A lot, Maximum/Moderate Assistance  3 = A little, Minimum/Contact Guard/Supervision  4 = None, Modified [place]/Independent    Turning over in bed (including adjusting bedclothes, sheets and blankets)?: 3  Sitting down on and standing up from a chair with arms (e.g., wheelchair, bedside commode, etc.): 2  Moving from lying on back to sitting on the side of the bed?: 2  Moving to and from a bed to a chair (including a wheelchair)?: 2  Need to walk in hospital room?: 2  Climbing 3-5 steps with a railing?: 2  Total Score: 13    AM-PAC Raw Score CMS G-Code Modifier Level of Impairment Assistance   6 % Total / Unable   7 - 9 CM 80 - 100% Maximal Assist   10 - 14 CL 60 - 80% Moderate Assist   15 - 19 CK 40 - 60% Moderate Assist   20 - 22 CJ 20 - 40% Minimal Assist   23 CI 1-20% SBA / CGA   24 CH 0% Independent/ Mod I     Patient left up in chair with all lines intact, call button in reach and mother present.    Assessment:  Tana Brewster is a 61 y.o. female with a medical diagnosis of Generalized weakness and presents with weakness, decreased endurance and functional mobility. Pt required encouragement to participate in therapy. Pt required frequent rest breaks 2 * fatigue . Pt will benefit from further skilled therapy.     Rehab identified problem list/impairments: Rehab identified problem list/impairments: weakness, impaired endurance, impaired self care skills, impaired functional mobilty, gait instability, impaired balance, decreased lower extremity function, decreased coordination, impaired cardiopulmonary response to activity    Rehab potential is fair.    Activity tolerance: Fair    Discharge recommendations: Discharge Facility/Level Of Care Needs: rehabilitation facility     Barriers to discharge: Barriers to Discharge: Decreased caregiver support    Equipment recommendations: Equipment Needed After Discharge: bedside  commode.     GOALS:   Physical Therapy Goals        Problem: Physical Therapy Goal    Goal Priority Disciplines Outcome Goal Variances Interventions   Physical Therapy Goal     PT/OT, PT Ongoing (interventions implemented as appropriate)     Description:  Goals to be met by: 17    Patient will increase functional independence with mobility by performin. Supine to sit with supervision  2. Sit to stand transfer supervision with RW  3. Gait 50ft with RW and supervision  4. Lower extremity exercise program x30 reps per handout, with supervision                 PLAN:    Patient to be seen 6 x/week  to address the above listed problems via gait training, therapeutic activities, therapeutic exercises  Plan of Care expires: 17  Plan of Care reviewed with: patient         Mary Carrera, PTA  2017

## 2017-01-11 NOTE — NURSING
Pt lying in bed resting quietly.  Pt AAOx3.  Resp even NL.  Pt denies pain/sob.  Plan of care discussed with pt.  Diaper changed.  Pt continues to have frequent BM's.  Advised pt to call for assistance.  Call light in reach, bed alarm on.

## 2017-01-11 NOTE — PLAN OF CARE
Problem: Fall Risk (Adult)  Goal: Absence of Falls  Patient will demonstrate the desired outcomes by discharge/transition of care.   Outcome: Ongoing (interventions implemented as appropriate)    01/10/17 1945   Fall Risk (Adult)   Absence of Falls making progress toward outcome         Problem: Infection, Risk/Actual (Adult)  Goal: Infection Prevention/Resolution  Patient will demonstrate the desired outcomes by discharge/transition of care.   Outcome: Ongoing (interventions implemented as appropriate)    01/10/17 1945   Infection, Risk/Actual (Adult)   Infection Prevention/Resolution making progress toward outcome         Problem: Pressure Ulcer Risk (Gerhard Scale) (Adult,Obstetrics,Pediatric)  Goal: Skin Integrity  Patient will demonstrate the desired outcomes by discharge/transition of care.   Outcome: Ongoing (interventions implemented as appropriate)    01/10/17 1945   Pressure Ulcer Risk (Gerhard Scale) (Adult,Obstetrics,Pediatric)   Skin Integrity making progress toward outcome         Problem: Liver Failure, Acute/Chronic (Adult)  Goal: Signs and Symptoms of Listed Potential Problems Will be Absent, Minimized or Managed (Liver Failure, Acute/Chronic)  Signs and symptoms of listed potential problems will be absent, minimized or managed by discharge/transition of care (reference Liver Failure, Acute/Chronic (Adult) CPG).   Outcome: Ongoing (interventions implemented as appropriate)    01/10/17 1945   Liver Failure, Acute/Chronic   Problems Assessed (Liver Failure, Acute/Chronic) all   Problems Present (Liver Failure, Acute/Chronic) fluid/electrolyte imbalance;gastrointestinal complications;infection;renal dysfunction;respiratory compromise

## 2017-01-11 NOTE — PLAN OF CARE
Problem: Occupational Therapy Goal  Goal: Occupational Therapy Goal  Goals to be met by: 1/21/2017     Patient will increase functional independence with ADLs by performing:    Feeding with Set-up Assistance.  UE Dressing with Minimal Assistance.  LE Dressing with Moderate Assistance.  Grooming while standing at sink with Set-up Assistance.  Toileting from toilet with Moderate Assistance for hygiene and clothing management.   Toilet transfer to bedside commode with Supervision.  Upper extremity exercise program x15 reps per handout, with assistance as needed.  Outcome: Ongoing (interventions implemented as appropriate)  Patient tolerated tx well.  Motivated to get OOB, and participation in am ADLs routine.

## 2017-01-11 NOTE — PLAN OF CARE
Problem: Fall Risk (Adult)  Goal: Absence of Falls  Patient will demonstrate the desired outcomes by discharge/transition of care.   Outcome: Ongoing (interventions implemented as appropriate)    01/10/17 2235   Fall Risk (Adult)   Absence of Falls making progress toward outcome         Problem: Infection, Risk/Actual (Adult)  Goal: Infection Prevention/Resolution  Patient will demonstrate the desired outcomes by discharge/transition of care.   Outcome: Ongoing (interventions implemented as appropriate)    01/10/17 2235   Infection, Risk/Actual (Adult)   Infection Prevention/Resolution making progress toward outcome         Problem: Patient Care Overview  Goal: Plan of Care Review  Outcome: Ongoing (interventions implemented as appropriate)    01/10/17 2235   Coping/Psychosocial   Plan Of Care Reviewed With patient         Problem: Pressure Ulcer Risk (Gerhard Scale) (Adult,Obstetrics,Pediatric)  Goal: Skin Integrity  Patient will demonstrate the desired outcomes by discharge/transition of care.   Outcome: Ongoing (interventions implemented as appropriate)    01/10/17 2235   Pressure Ulcer Risk (Gerhard Scale) (Adult,Obstetrics,Pediatric)   Skin Integrity making progress toward outcome         Problem: Liver Failure, Acute/Chronic (Adult)  Goal: Signs and Symptoms of Listed Potential Problems Will be Absent, Minimized or Managed (Liver Failure, Acute/Chronic)  Signs and symptoms of listed potential problems will be absent, minimized or managed by discharge/transition of care (reference Liver Failure, Acute/Chronic (Adult) CPG).   Outcome: Ongoing (interventions implemented as appropriate)    01/10/17 2235   Liver Failure, Acute/Chronic   Problems Assessed (Liver Failure, Acute/Chronic) all   Problems Present (Liver Failure, Acute/Chronic) fluid/electrolyte imbalance

## 2017-01-11 NOTE — PLAN OF CARE
Problem: Physical Therapy Goal  Goal: Physical Therapy Goal  Goals to be met by: 17    Patient will increase functional independence with mobility by performin. Supine to sit with supervision  2. Sit to stand transfer supervision with RW  3. Gait 50ft with RW and supervision  4. Lower extremity exercise program x30 reps per handout, with supervision   Outcome: Ongoing (interventions implemented as appropriate)  Continue with POC.

## 2017-01-11 NOTE — PT/OT/SLP PROGRESS
Occupational Therapy  Treatment    Tana Ney   MRN: [id number]   Admitting Diagnosis: Generalized weakness    OT Date of Treatment: 17   OT Start Time: 1029  OT Stop Time: 1100  OT Total Time (min): 31 min    Billable Minutes:  Self Care/Home Management 20 and Therapeutic Activity 11    General Precautions: Standard, fall  Orthopedic Precautions: N/A  Braces: N/A    Do you have any cultural, spiritual, Quaker conflicts, given your current situation?: no    Subjective:  Communicated with nurse prior to session.      Pain Ratin/10     Objective:  Patient found with: pulse ox (continuous), oxygen     Functional Mobility:  Bed Mobility:  Rolling/Turning to Left: Minimum assistance  Rolling/Turning Right: Minimum assistance  Scooting/Bridging: Minimum Assistance  Supine to Sit: Moderate Assistance    Transfers:   Sit <> Stand Assistance: Contact Guard Assistance  Sit <> Stand Assistive Device: Rolling Walker  Bed <> Chair Technique: Stand Pivot  Bed <> Chair Transfer Assistance: Minimum Assistance (Assist secondary L knee buckled)  Bed <> Chair Assistive Device: Rolling Walker    Functional Ambulation: Side steps with min A    Activities of Daily Living:     Grooming Position: Seated  Grooming Level of Assistance: Minimum assistance (Assist with hair only)  Toileting Where Assessed: Bed level  Toileting Level of Assistance: Total assistance (Accident in brief)        Balance:   Static Sit: GOOD-: Takes MODERATE challenges from all directions but inconsistently  Dynamic Sit: GOOD-: Maintains balance through MODERATE excursions of active trunk movement,     Static Stand: POOR+: Needs MINIMAL assist to maintain  Dynamic stand: FAIR: Needs CONTACT GUARD during gait    Therapeutic Activities and Exercises:  Patient sat completed rolling for brief change and perineal hygiene, then completed supine to sit and transfer to BS chair.  Patient left OOB seated resting.    AM-PAC 6 CLICK ADL   How much help from another  person does this patient currently need?   1 = Unable, Total/Dependent Assistance  2 = A lot, Maximum/Moderate Assistance  3 = A little, Minimum/Contact Guard/Supervision  4 = None, Modified [place]/Independent    Putting on and taking off regular lower body clothing? : 2  Bathing (including washing, rinsing, drying)?: 2  Toileting, which includes using toilet, bedpan, or urinal? : 2  Putting on and taking off regular upper body clothing?: 3  Taking care of personal grooming such as brushing teeth?: 3  Eating meals?: 3  Total Score: 15     AM-PAC Raw Score CMS G-Code Modifier Level of Impairment Assistance   6 % Total / Unable   7 - 9 CM 80 - 100% Maximal Assist   10 - 14 CL 60 - 80% Moderate Assist   15 - 19 CK 40 - 60% Moderate Assist   20 - 22 CJ 20 - 40% Minimal Assist   23 CI 1-20% SBA / CGA   24 CH 0% Independent/ Mod I     Patient left up in chair with all lines intact, call button in reach and sister present    ASSESSMENT:  Tana Brewster is a 61 y.o. female with a medical diagnosis of Generalized weakness and presents with good engagement and participation with ADLs and OOB this date.  Patient L knee buckled on transfer but with good recovery.    Rehab identified problem list/impairments: Rehab identified problem list/impairments: weakness, impaired endurance, impaired self care skills, impaired functional mobilty, gait instability, impaired balance, decreased lower extremity function, impaired skin, impaired cardiopulmonary response to activity    Rehab potential is good.    Activity tolerance: Good    Discharge recommendations: Discharge Facility/Level Of Care Needs: rehabilitation facility     Barriers to discharge: Barriers to Discharge: Inaccessible home environment, Decreased caregiver support    Equipment recommendations:  (TBD as needed)     GOALS:   Occupational Therapy Goals        Problem: Occupational Therapy Goal    Goal Priority Disciplines Outcome Interventions   Occupational Therapy  Goal     OT, PT/OT Ongoing (interventions implemented as appropriate)    Description:  Goals to be met by: 1/21/2017     Patient will increase functional independence with ADLs by performing:    Feeding with Set-up Assistance.  UE Dressing with Minimal Assistance.  LE Dressing with Moderate Assistance.  Grooming while standing at sink with Set-up Assistance.  Toileting from toilet with Moderate Assistance for hygiene and clothing management.   Toilet transfer to bedside commode with Supervision.  Upper extremity exercise program x15 reps per handout, with assistance as needed.                Plan:  Patient to be seen 5 x/week to address the above listed problems via self-care/home management, therapeutic activities, therapeutic exercises  Plan of Care expires:  1/21/17  Plan of Care reviewed with: patient         Sapphire RENE Diaz, OT  01/11/2017

## 2017-01-12 LAB
ALBUMIN SERPL BCP-MCNC: 1.9 G/DL
ALP SERPL-CCNC: 418 U/L
ALT SERPL W/O P-5'-P-CCNC: 48 U/L
ANION GAP SERPL CALC-SCNC: 10 MMOL/L
AST SERPL-CCNC: 166 U/L
BASOPHILS # BLD AUTO: 0.02 K/UL
BASOPHILS NFR BLD: 0.3 %
BILIRUB SERPL-MCNC: 2.2 MG/DL
BUN SERPL-MCNC: 33 MG/DL
CALCIUM SERPL-MCNC: 9.4 MG/DL
CHLORIDE SERPL-SCNC: 107 MMOL/L
CO2 SERPL-SCNC: 25 MMOL/L
CREAT SERPL-MCNC: 2 MG/DL
DIFFERENTIAL METHOD: ABNORMAL
EOSINOPHIL # BLD AUTO: 0.2 K/UL
EOSINOPHIL NFR BLD: 3.5 %
ERYTHROCYTE [DISTWIDTH] IN BLOOD BY AUTOMATED COUNT: 15.8 %
EST. GFR  (AFRICAN AMERICAN): 31 ML/MIN/1.73 M^2
EST. GFR  (NON AFRICAN AMERICAN): 27 ML/MIN/1.73 M^2
GLUCOSE SERPL-MCNC: 92 MG/DL
HCT VFR BLD AUTO: 31.6 %
HGB BLD-MCNC: 10.5 G/DL
LYMPHOCYTES # BLD AUTO: 0.9 K/UL
LYMPHOCYTES NFR BLD: 15 %
MCH RBC QN AUTO: 31.1 PG
MCHC RBC AUTO-ENTMCNC: 33.2 %
MCV RBC AUTO: 94 FL
MONOCYTES # BLD AUTO: 0.7 K/UL
MONOCYTES NFR BLD: 12.2 %
NEUTROPHILS # BLD AUTO: 4.2 K/UL
NEUTROPHILS NFR BLD: 69 %
PLATELET # BLD AUTO: 130 K/UL
PMV BLD AUTO: 11.4 FL
POTASSIUM SERPL-SCNC: 3.5 MMOL/L
PROT SERPL-MCNC: 6.8 G/DL
RBC # BLD AUTO: 3.38 M/UL
SODIUM SERPL-SCNC: 142 MMOL/L
WBC # BLD AUTO: 6.07 K/UL

## 2017-01-12 PROCEDURE — 97110 THERAPEUTIC EXERCISES: CPT

## 2017-01-12 PROCEDURE — 85025 COMPLETE CBC W/AUTO DIFF WBC: CPT

## 2017-01-12 PROCEDURE — 25000003 PHARM REV CODE 250: Performed by: HOSPITALIST

## 2017-01-12 PROCEDURE — 80053 COMPREHEN METABOLIC PANEL: CPT

## 2017-01-12 PROCEDURE — 36415 COLL VENOUS BLD VENIPUNCTURE: CPT

## 2017-01-12 PROCEDURE — 63600175 PHARM REV CODE 636 W HCPCS: Performed by: EMERGENCY MEDICINE

## 2017-01-12 PROCEDURE — 97535 SELF CARE MNGMENT TRAINING: CPT

## 2017-01-12 PROCEDURE — 21400001 HC TELEMETRY ROOM

## 2017-01-12 PROCEDURE — 25000003 PHARM REV CODE 250: Performed by: EMERGENCY MEDICINE

## 2017-01-12 PROCEDURE — 97116 GAIT TRAINING THERAPY: CPT

## 2017-01-12 RX ORDER — CIPROFLOXACIN 500 MG/1
500 TABLET ORAL EVERY 12 HOURS
Status: DISCONTINUED | OUTPATIENT
Start: 2017-01-12 | End: 2017-01-12 | Stop reason: ALTCHOICE

## 2017-01-12 RX ORDER — CIPROFLOXACIN 250 MG/1
250 TABLET, FILM COATED ORAL EVERY 12 HOURS
Status: DISCONTINUED | OUTPATIENT
Start: 2017-01-12 | End: 2017-01-23 | Stop reason: HOSPADM

## 2017-01-12 RX ADMIN — RIFAXIMIN 550 MG: 550 TABLET ORAL at 08:01

## 2017-01-12 RX ADMIN — LACTULOSE 30 G: 10 SOLUTION ORAL at 06:01

## 2017-01-12 RX ADMIN — LACTULOSE 30 G: 10 SOLUTION ORAL at 02:01

## 2017-01-12 RX ADMIN — CIPROFLOXACIN HYDROCHLORIDE 250 MG: 250 TABLET, FILM COATED ORAL at 09:01

## 2017-01-12 RX ADMIN — RIFAXIMIN 550 MG: 550 TABLET ORAL at 09:01

## 2017-01-12 RX ADMIN — Medication 3 ML: at 02:01

## 2017-01-12 RX ADMIN — LACTULOSE 30 G: 10 SOLUTION ORAL at 09:01

## 2017-01-12 RX ADMIN — CEFTRIAXONE 2 G: 2 INJECTION, SOLUTION INTRAVENOUS at 03:01

## 2017-01-12 RX ADMIN — FOLIC ACID 1 MG: 1 TABLET ORAL at 08:01

## 2017-01-12 RX ADMIN — Medication 3 ML: at 09:01

## 2017-01-12 RX ADMIN — Medication 3 ML: at 06:01

## 2017-01-12 NOTE — SUBJECTIVE & OBJECTIVE
Interval History: Pt report feeling better today, feel more like herself. Confirms affirmation to stay sober which she has been for the past 2 months.    Review of Systems   Constitutional: Negative for chills and fever.   Eyes: Negative for visual disturbance.   Respiratory: Negative for shortness of breath.    Cardiovascular: Negative for chest pain.     Objective:     Vital signs: Reviewed.  Intake/Output Summary (Last 24 hours): Reviewed    Physical Exam   Constitutional: She is oriented to person, place, and time.   Chronically ill appearing.   HENT:   Head: Normocephalic and atraumatic.   Eyes: EOM are normal. Pupils are equal, round, and reactive to light.   Neck: Normal range of motion. Neck supple.   Cardiovascular: Normal rate and regular rhythm.    Pulmonary/Chest: Effort normal and breath sounds normal.   Abdominal:   Soft but distended, NT, no rebound or guarding, +ascites, +BS.   Musculoskeletal: Normal range of motion.   Neurological: She is alert and oriented to person, place, and time.   Skin: Skin is warm and dry.       Significant Labs: All pertinent labs within the past 24 hours have been reviewed.    Significant Imaging: I have reviewed and interpreted all pertinent imaging results/findings within the past 24 hours.

## 2017-01-12 NOTE — PLAN OF CARE
"Problem: Liver Failure, Acute/Chronic (Adult)  Intervention: Provide Oxygenation/Ventilation/Perfusion Support    01/12/17 0748   Activity   Activity Type activity adjusted per tolerance;activity clustered for rest period;activity minimized       Intervention: Monitor/Manage Pain    01/12/17 0748   Safety Interventions   Medication Review/Management medications reviewed       Intervention: Support/Optimize Psychosocial Response to Liver Disease    01/12/17 0748   Coping/Psychosocial Interventions   Supportive Measures active listening utilized       Intervention: Prevent/Manage Infection    01/12/17 0748   Safety Interventions   Infection Management aseptic technique maintained       Intervention: Manage Hepatic Encephalopathy    01/12/17 0748   Neurological Interventions   Hepatic Encephalopathy Management airway protection maintained;continuous EEG monitored       Intervention: Manage Bleeding Risk    01/12/17 0748   Safety Interventions   Bleeding Precautions blood pressure closely monitored;coagulation study results reviewed;monitored for signs of bleeding       Intervention: Optimize/Manage Nutrition    01/12/17 0748   Nutrition Interventions   Oral Nutrition Promotion social interaction promoted   Coping/Psychosocial Interventions   Environmental Support calm environment promoted       Intervention: Monitor/Manage Fluid and Electrolyte Balance    01/12/17 0748   Nutrition Interventions   Fluid/Electrolyte Management electrolyte supplement adjusted           Comments:   Pt remains on lactulose as ordered. Pt has had several stools this shift. Pt aaox3  No lethargy noted.skin tone remains with a yellow undertone. o2 in use at 2 liters n/cand continuous pulse in use  . sao2  In the 90"s labs pending.  "

## 2017-01-12 NOTE — ASSESSMENT & PLAN NOTE
Initial there was concern for possible prerenal etiology vs hepatorenal syndrome but given improving creatinine with IVF, therefore more likely prerenal, continue current care and monitor.

## 2017-01-12 NOTE — PROGRESS NOTES
TAPAN contacted Loida with [facility] to determine if pt would meet for rehab placement. Loida informed SW she would review pt medical records. TAPAN added Loida to pt treatment team.

## 2017-01-12 NOTE — PLAN OF CARE
Problem: Occupational Therapy Goal  Goal: Occupational Therapy Goal  Goals to be met by: 1/21/2017     Patient will increase functional independence with ADLs by performing:    Feeding with Set-up Assistance.  UE Dressing with Minimal Assistance.  LE Dressing with Moderate Assistance.  Grooming while standing at sink with Set-up Assistance.  Toileting from toilet with Moderate Assistance for hygiene and clothing management.   Toilet transfer to bedside commode with Supervision.  Upper extremity exercise program x15 reps per handout, with assistance as needed.   Outcome: Ongoing (interventions implemented as appropriate)  Patient found completing bed mobility and transfer with nurse for OOB ax.  Nurse instructed patient on importance of OOB ax and calling for assist as needed.  Patient displays good engagement in axs and exs this date.  OT to cont POC to increase patient independence with ADLs.

## 2017-01-12 NOTE — PROGRESS NOTES
[facility] Hospital Medicine  Progress Note    Patient Name: Tana Brewster  MRN: [id number]  Patient Class: IP- Inpatient   Admission Date: 1/8/2017  Length of Stay: 4 days  Attending Physician: Rupali Birmingham MD  Primary Care Provider: Pablo Browne MD        Subjective:     Principal Problem:Generalized weakness    HPI:  Tana Brewster is a 61 y.o. female that  has a past medical history of Crohn disease and Hypertension. Presents to [facility] Emergency Department complaining of generalized weakness.  She has a long-standing history of chronic alcoholism.  She states she has had subacute onset of generalized weakness that is progressively worsened over the last week.  Her symptoms are constant.  She thinks they are possibly exacerbated by decreased appetite and oral intake.  Associated symptoms include ataxia and bilateral lower extremity weakness.  Associated symptoms also include intermittent dysuria and she feels like she has a urinary tract infection.  She denies abdominal pain but does have some suprapubic discomfort.  She had a normal bowel movement this morning and denies melena, hematochezia, hematemesis, nausea, vomiting, chest pain, shortness of breath, syncope, vertigo, or tremors, fever, chills, or skin lesions or infections.  In the emergency department routine laboratory studies were obtained which demonstrated multiple abnormalities including evidence of urinary tract infection and electrolyte abnormalities.        Hospital Course:  62 y/o with cirrhosis due to ETOH abuse who was admitted for encephalopathy, UTI and electrolyte abnormalities. Pt was treated with Rocephin for UTI and had ascites evaluated/treated with paracentesis, and mentation cleared. Pt had persistent hypokalemia now improved. Pt with marked physical debility and deconditioning requiring therapy.    Interval History: Pt report feeling better, but feel very weak.    Review of Systems    Constitutional: Negative for chills and fever.   Eyes: Negative for visual disturbance.   Respiratory: Negative for shortness of breath.    Cardiovascular: Negative for chest pain.     Objective:     Vital Signs (Most Recent):  Temp: 97.3 °F (36.3 °C) (01/12/17 1130)  Pulse: 77 (01/12/17 1130)  Resp: 18 (01/12/17 1130)  BP: (!) 108/54 (01/12/17 1130)  SpO2: 97 % (01/12/17 1130) Vital Signs (24h Range):  Temp:  [97.3 °F (36.3 °C)-98.1 °F (36.7 °C)] 97.3 °F (36.3 °C)  Pulse:  [72-78] 77  Resp:  [18-19] 18  SpO2:  [95 %-98 %] 97 %  BP: ()/(51-55) 108/54     Weight: 79.8 kg (176 lb)  Body mass index is 32.19 kg/(m^2).    Intake/Output Summary (Last 24 hours) at 01/12/17 1413  Last data filed at 01/12/17 0400   Gross per 24 hour   Intake              530 ml   Output                0 ml   Net              530 ml      Physical Exam   Constitutional: She is oriented to person, place, and time.   Chronically ill appearing.   HENT:   Head: Normocephalic and atraumatic.   Eyes: EOM are normal. Pupils are equal, round, and reactive to light.   Neck: Normal range of motion. Neck supple.   Cardiovascular: Normal rate and regular rhythm.    Pulmonary/Chest: Effort normal and breath sounds normal.   Abdominal:   Soft but distended, NT, no rebound or guarding, +ascites, +BS.   Musculoskeletal: Normal range of motion.   Neurological: She is alert and oriented to person, place, and time.   Skin: Skin is warm and dry.       Significant Labs: All pertinent labs within the past 24 hours have been reviewed.    Significant Imaging: I have reviewed and interpreted all pertinent imaging results/findings within the past 24 hours.    Assessment/Plan:      * Generalized weakness  Multifactorial due to ARF, UTI, chronic alcoholism, malnutrition with resultant deconditioning, debility, functional impaired mobility and balance. Will treat underlying conditions and continue therapy with PT/OT. Pt making good progress and is motivated,  excellent rehab candidate.    Acute on chronic renal failure  Initial there was concern for possible prerenal etiology vs hepatorenal syndrome but given improving creatinine with IVF, therefore more likely prerenal, getting close to baseline, continue current care and monitor.    Hypokalemia  Resolved. Replaced judiciously given ARF, monitor.    Hypomagnesemia  Repleted and monitor.    Encephalopathy, metabolic  Resolved. Multifactorial, due to infectious process, and hepatic encephalopathy.    Ascites due to alcoholic cirrhosis  Paracentesis by IR on 1/9. Improved.    Acute cystitis without hematuria  Due to E. Coli. Pt was on Rocephin, will switch to PO today.    Chronic alcohol abuse  Pt reports she has stopped drinking for the past 2 months. Continued sobriety encouraged. No signs of withdrawal. Supplement with thiamine and folic acid.    Elevated troponin  Followed markers, not consistent to ACS.      VTE Risk Mitigation         Ordered     Medium Risk of VTE  Once      01/08/17 2145     Place sequential compression device  Until discontinued      01/08/17 2145     Place SARAH hose  Until discontinued      01/08/17 2145          Rupali Birmingham MD  Department of Hospital Medicine   [facility]

## 2017-01-12 NOTE — ASSESSMENT & PLAN NOTE
Pt reports she has stopped drinking for the past 2 months. Continued sobriety encouraged. No signs of withdrawal. Supplement with thiamine and folic acid.

## 2017-01-12 NOTE — SUBJECTIVE & OBJECTIVE
Interval History: Pt report feeling better, but feel very weak.    Review of Systems   Constitutional: Negative for chills and fever.   Eyes: Negative for visual disturbance.   Respiratory: Negative for shortness of breath.    Cardiovascular: Negative for chest pain.     Objective:     Vital Signs (Most Recent):  Temp: 97.3 °F (36.3 °C) (01/12/17 1130)  Pulse: 77 (01/12/17 1130)  Resp: 18 (01/12/17 1130)  BP: (!) 108/54 (01/12/17 1130)  SpO2: 97 % (01/12/17 1130) Vital Signs (24h Range):  Temp:  [97.3 °F (36.3 °C)-98.1 °F (36.7 °C)] 97.3 °F (36.3 °C)  Pulse:  [72-78] 77  Resp:  [18-19] 18  SpO2:  [95 %-98 %] 97 %  BP: ()/(51-55) 108/54     Weight: 79.8 kg (176 lb)  Body mass index is 32.19 kg/(m^2).    Intake/Output Summary (Last 24 hours) at 01/12/17 1355  Last data filed at 01/12/17 0400   Gross per 24 hour   Intake              530 ml   Output                0 ml   Net              530 ml      Physical Exam   Constitutional: She is oriented to person, place, and time.   Chronically ill appearing.   HENT:   Head: Normocephalic and atraumatic.   Eyes: EOM are normal. Pupils are equal, round, and reactive to light.   Neck: Normal range of motion. Neck supple.   Cardiovascular: Normal rate and regular rhythm.    Pulmonary/Chest: Effort normal and breath sounds normal.   Abdominal:   Soft but distended, NT, no rebound or guarding, +ascites, +BS.   Musculoskeletal: Normal range of motion.   Neurological: She is alert and oriented to person, place, and time.   Skin: Skin is warm and dry.       Significant Labs: All pertinent labs within the past 24 hours have been reviewed.    Significant Imaging: I have reviewed and interpreted all pertinent imaging results/findings within the past 24 hours.

## 2017-01-12 NOTE — PT/OT/SLP PROGRESS
Physical Therapy  Treatment    Tana Brewster   MRN: [id number]   Admitting Diagnosis: Generalized weakness    PT Received On: 17  PT Start Time: 1425     PT Stop Time: 1449    PT Total Time (min): 24 min       Billable Minutes:  Gait [id number] and Therapeutic Exercise 12    Treatment Type: Treatment  PT/PTA: PTA     PTA Visit Number: 2       General Precautions: Standard, fall  Orthopedic Precautions: N/A   Braces: N/A         Subjective:  Communicated with nurse Kindra prior to session.  Pt agreeable to therapy.   Pt's mother present.   Pain Ratin/10              Pain Rating Post-Intervention: 0/10    Objective:   Patient found with: telemetry, oxygen    Functional Mobility:  Bed Mobility:    pt found up in bedside chair.     Transfers: from bed. V/T cues for safety technique and walker management   Sit <> Stand Assistance: Minimum Assistance  Sit <> Stand Assistive Device: Rolling Walker    Gait:   Gait Distance: ~ 14 steps , bedisde chair followed . Slow maverick, V/T cues for safety technique and walker management.  Assistance 1: Minimum assistance, Contact Guard Assistance  Gait Assistive Device: Rolling walker  Gait Pattern: 3-point gait  Gait Deviation(s): decreased maverick, increased time in double stance, decreased velocity of limb motion, decreased step length, decreased weight-shifting ability, decreased toe-to-floor clearance, decreased swing-to-stance ratio      Balance:   Static Sit: FAIR+: Able to take MINIMAL challenges from all directions  Dynamic Sit: FAIR: Cannot move trunk without losing balance  Static Stand: FAIR: Maintains without assist but unable to take challenges  Dynamic stand: FAIR: Needs CONTACT GUARD during gait     Therapeutic Activities and Exercises:  Pt performed seated BLE x 15 reps : heel/toes raise, LAQ, HS, pillow squeezes, hip flexion. Pt required frequent rest breaks during ex's 2* fatigue.      AM-PAC 6 CLICK MOBILITY  How much help from another person does this patient  currently need?   1 = Unable, Total/Dependent Assistance  2 = A lot, Maximum/Moderate Assistance  3 = A little, Minimum/Contact Guard/Supervision  4 = None, Modified [place]/Independent    Turning over in bed (including adjusting bedclothes, sheets and blankets)?: 3  Sitting down on and standing up from a chair with arms (e.g., wheelchair, bedside commode, etc.): 3  Moving from lying on back to sitting on the side of the bed?: 3  Moving to and from a bed to a chair (including a wheelchair)?: 3  Need to walk in hospital room?: 3  Climbing 3-5 steps with a railing?: 2  Total Score: 17    AM-PAC Raw Score CMS G-Code Modifier Level of Impairment Assistance   6 % Total / Unable   7 - 9 CM 80 - 100% Maximal Assist   10 - 14 CL 60 - 80% Moderate Assist   15 - 19 CK 40 - 60% Moderate Assist   20 - 22 CJ 20 - 40% Minimal Assist   23 CI 1-20% SBA / CGA   24 CH 0% Independent/ Mod I     Patient left up in chair with all lines intact, call button in reach and nurse Kindra present.    Assessment:  Tana Brewster is a 61 y.o. female with a medical diagnosis of Generalized weakness and presents with weakness, decreased endurance and functional mobility. Pt required encouragement to participate on therapy. Pt will benefit from further skilled therapy.    Rehab identified problem list/impairments: Rehab identified problem list/impairments: weakness, impaired endurance, impaired self care skills, impaired functional mobilty, gait instability, impaired balance, decreased coordination, decreased lower extremity function, impaired cardiopulmonary response to activity    Rehab potential is fair.    Activity tolerance: Good-    Discharge recommendations: Discharge Facility/Level Of Care Needs: rehabilitation facility     Barriers to discharge: Barriers to Discharge: Decreased caregiver support    Equipment recommendations: Equipment Needed After Discharge: bedside commode.     GOALS:   Physical Therapy Goals        Problem:  Physical Therapy Goal    Goal Priority Disciplines Outcome Goal Variances Interventions   Physical Therapy Goal     PT/OT, PT Ongoing (interventions implemented as appropriate)     Description:  Goals to be met by: 17    Patient will increase functional independence with mobility by performin. Supine to sit with supervision  2. Sit to stand transfer supervision with RW  3. Gait 50ft with RW and supervision  4. Lower extremity exercise program x30 reps per handout, with supervision                 PLAN:    Patient to be seen 6 x/week  to address the above listed problems via gait training, therapeutic activities, therapeutic exercises  Plan of Care expires: 17  Plan of Care reviewed with: patient         Mary Carrera, PTA  2017

## 2017-01-12 NOTE — ASSESSMENT & PLAN NOTE
Multifactorial due to ARF, UTI, chronic alcoholism, malnutrition with resultant deconditioning, debility, functional impaired mobility and balance. Will treat underlying conditions and continue therapy with PT/OT. Pt making good progress and is motivated, excellent rehab candidate.

## 2017-01-12 NOTE — PROGRESS NOTES
Dr guevara notified of request for anxiety med, no order given due to liver compromise at this time.

## 2017-01-12 NOTE — PROGRESS NOTES
[facility] Hospital Medicine  Progress Note    Patient Name: Tana Brewster  MRN: [id number]  Patient Class: IP- Inpatient   Admission Date: 1/8/2017  Length of Stay: 3 days  Attending Physician: Rupali Birmingham MD  Primary Care Provider: Pablo Browne MD        Subjective:     Principal Problem:Generalized weakness    HPI:  Tana Brewster is a 61 y.o. female that  has a past medical history of Crohn disease and Hypertension. Presents to [facility] complaining of generalized weakness.  She has a long-standing history of chronic alcoholism.  She states she has had subacute onset of generalized weakness that is progressively worsened over the last week.  Her symptoms are constant.  She thinks they are possibly exacerbated by decreased appetite and oral intake.  Associated symptoms include ataxia and bilateral lower extremity weakness.  Associated symptoms also include intermittent dysuria and she feels like she has a urinary tract infection.  She denies abdominal pain but does have some suprapubic discomfort.  She had a normal bowel movement this morning and denies melena, hematochezia, hematemesis, nausea, vomiting, chest pain, shortness of breath, syncope, vertigo, or tremors, fever, chills, or skin lesions or infections.    In the emergency department routine laboratory studies were obtained which demonstrated multiple abnormalities including evidence of urinary tract infection and electrolyte abnormalities.      Hospital medicine has been asked to admit for further evaluation and treatment.         Hospital Course:  60 y/o with cirrhosis due to ETOH abuse who was admitted for encephalopathy, UTI and electrolyte abnormalities. Pt was treated with Rocephin for UTI and had ascites evaluated/treated with paracentesis, and mentation cleared. Pt still with persistent hypokalemia. Pt with marked physical debility and deconditioning requiring therapy.    Interval  History: Pt report feeling better today, feel more like herself. Confirms affirmation to stay sober which she has been for the past 2 months.    Review of Systems   Constitutional: Negative for chills and fever.   Eyes: Negative for visual disturbance.   Respiratory: Negative for shortness of breath.    Cardiovascular: Negative for chest pain.     Objective:     Vital signs: Reviewed.  Intake/Output Summary (Last 24 hours): Reviewed    Physical Exam   Constitutional: She is oriented to person, place, and time.   Chronically ill appearing.   HENT:   Head: Normocephalic and atraumatic.   Eyes: EOM are normal. Pupils are equal, round, and reactive to light.   Neck: Normal range of motion. Neck supple.   Cardiovascular: Normal rate and regular rhythm.    Pulmonary/Chest: Effort normal and breath sounds normal.   Abdominal:   Soft but distended, NT, no rebound or guarding, +ascites, +BS.   Musculoskeletal: Normal range of motion.   Neurological: She is alert and oriented to person, place, and time.   Skin: Skin is warm and dry.       Significant Labs: All pertinent labs within the past 24 hours have been reviewed.    Significant Imaging: I have reviewed and interpreted all pertinent imaging results/findings within the past 24 hours.    Assessment/Plan:      * Generalized weakness  Multifactorial due to ARF, UTI, chronic alcoholism, malnutrition with resultant deconditioning, debility, functional impaired mobility and balance. Will treat underlying conditions and continue therapy with PT/OT. Pt will need rehab.      Acute on chronic renal failure  Initial there was concern for possible prerenal etiology vs hepatorenal syndrome but given improving creatinine with IVF, therefore more likely prerenal, continue current care and monitor.    Hypokalemia  Replace judiciously given ARF, monitor and follow Mg level as well.      Hypomagnesemia  Repleted and monitor.      Encephalopathy, metabolic  Resolved. Multifactorial, due to  infectious process, and hepatic encephalopathy.    Ascites due to alcoholic cirrhosis  Paracentesis by IR on 1/9. Improved.      Acute cystitis without hematuria  Due to E. Coli. Continue to treat with Rocephin for now, will consider switch to PO soon.      Chronic alcohol abuse  Pt reports she has stopped drinking for the past 2 months. Continued sobriety encouraged. No signs of withdrawal. Supplement with thiamine and folic acid.    Elevated troponin  Followed markers, not consistent to ACS.      VTE Risk Mitigation         Ordered     Medium Risk of VTE  Once      01/08/17 2145     Place sequential compression device  Until discontinued      01/08/17 2145     Place SARAH hose  Until discontinued      01/08/17 2145          Rupali Birmingham MD  Department of Hospital Medicine   [facility]

## 2017-01-12 NOTE — PROGRESS NOTES
"Pt care assumed,pt lying in supine position with hob elevated.pt has a jaudic  Skin tone. Telemetry monitor in place with alarms. Pt denies c/o of chest pains or any other discomfort. o2 in use at 2 liter n/c with continuous pulse ox in place  Showing  In the 90"s. dsg to abd is intact and dry. Pt abd distended and non tender to touch. Personal items are within reach.safety maintained with bed alarm. Pt informed of md orders.  "

## 2017-01-12 NOTE — ASSESSMENT & PLAN NOTE
Possible prerenal etiology vs hepatorenal, improving with IVF therefore more likely prerenal, continue current care and monitor.

## 2017-01-12 NOTE — SUBJECTIVE & OBJECTIVE
Interval History: Pt report feeling good, no abdominal pain, having 3-4 bowels a day as expected, no N/V, and no increase in abdominal distention.    Review of Systems   Constitutional: Negative for chills and fever.   Eyes: Negative for visual disturbance.   Respiratory: Negative for shortness of breath.    Cardiovascular: Negative for chest pain.     Objective:     Vital Signs (Most Recent):  Temp: 97.3 °F (36.3 °C) (01/12/17 1130)  Pulse: 77 (01/12/17 1130)  Resp: 18 (01/12/17 1130)  BP: (!) 108/54 (01/12/17 1130)  SpO2: 97 % (01/12/17 1130) Vital Signs (24h Range):  Temp:  [97.3 °F (36.3 °C)-98.1 °F (36.7 °C)] 97.3 °F (36.3 °C)  Pulse:  [72-78] 77  Resp:  [18-19] 18  SpO2:  [95 %-98 %] 97 %  BP: ()/(51-55) 108/54     Weight: 79.8 kg (176 lb)  Body mass index is 32.19 kg/(m^2).    Intake/Output Summary (Last 24 hours) at 01/12/17 1520  Last data filed at 01/12/17 1446   Gross per 24 hour   Intake             1370 ml   Output                0 ml   Net             1370 ml      Physical Exam   Constitutional: She is oriented to person, place, and time.   Chronically ill appearing, but more energetic and appropriate.   HENT:   Head: Normocephalic and atraumatic.   Eyes: EOM are normal. Pupils are equal, round, and reactive to light.   Neck: Normal range of motion. Neck supple.   Cardiovascular: Normal rate and regular rhythm.    Pulmonary/Chest: Effort normal and breath sounds normal.   Abdominal:   Soft but distended at her baseline, NT, no rebound or guarding, +ascites, +BS.   Musculoskeletal: Normal range of motion.   Neurological: She is alert and oriented to person, place, and time.   Skin: Skin is warm and dry.       Significant Labs: All pertinent labs within the past 24 hours have been reviewed.    Significant Imaging: I have reviewed and interpreted all pertinent imaging results/findings within the past 24 hours.

## 2017-01-12 NOTE — SUBJECTIVE & OBJECTIVE
Interval History: Pt report feeling better, but feel very weak.    Review of Systems   Constitutional: Negative for chills and fever.   Eyes: Negative for visual disturbance.   Respiratory: Negative for shortness of breath.    Cardiovascular: Negative for chest pain.     Objective:     Vital Signs (Most Recent):  Temp: 97.3 °F (36.3 °C) (01/12/17 1130)  Pulse: 77 (01/12/17 1130)  Resp: 18 (01/12/17 1130)  BP: (!) 108/54 (01/12/17 1130)  SpO2: 97 % (01/12/17 1130) Vital Signs (24h Range):  Temp:  [97.3 °F (36.3 °C)-98.1 °F (36.7 °C)] 97.3 °F (36.3 °C)  Pulse:  [72-78] 77  Resp:  [18-19] 18  SpO2:  [95 %-98 %] 97 %  BP: ()/(51-55) 108/54     Weight: 79.8 kg (176 lb)  Body mass index is 32.19 kg/(m^2).    Intake/Output Summary (Last 24 hours) at 01/12/17 1413  Last data filed at 01/12/17 0400   Gross per 24 hour   Intake              530 ml   Output                0 ml   Net              530 ml      Physical Exam   Constitutional: She is oriented to person, place, and time.   Chronically ill appearing.   HENT:   Head: Normocephalic and atraumatic.   Eyes: EOM are normal. Pupils are equal, round, and reactive to light.   Neck: Normal range of motion. Neck supple.   Cardiovascular: Normal rate and regular rhythm.    Pulmonary/Chest: Effort normal and breath sounds normal.   Abdominal:   Soft but distended, NT, no rebound or guarding, +ascites, +BS.   Musculoskeletal: Normal range of motion.   Neurological: She is alert and oriented to person, place, and time.   Skin: Skin is warm and dry.       Significant Labs: All pertinent labs within the past 24 hours have been reviewed.    Significant Imaging: I have reviewed and interpreted all pertinent imaging results/findings within the past 24 hours.

## 2017-01-12 NOTE — PROGRESS NOTES
[facility]  Adult Nutrition  Consult Note    SUMMARY     Recommendations    Recommendation/Intervention: 1) Change oral supplement to Novasource Renal 2) Consider liberalizing diet  Goals: Patient will consume >=75% of meals and 100% of supplements  Nutrition Goal Status: new  Communication of RD Recs: other (comment) (physician's sticky note)    Continuum of Care Plan    Referral to Outpatient Services:  (D/C plannin gm sodium diet with supplements as needed)    Reason for Assessment    Reason for Assessment: RD follow-up  Diagnosis:  (UTI, general weakness)  Relevent Medical History: ETOH abuse, cirrhosis, ascities, met encephalopathy, acute on chronic renal failure, malnutrition; HTN, chrohn's disease   General Information Comments: Patient's appetite remains poor. She does drink her supplements. Recommended we change supplement to Novasource Renal because higher in calories and protein compared to her current oral supplement. She denies abdominal pain and nausea.     Nutrition Prescription Ordered    Current Diet Order: Renal, Cardiac  Oral Nutrition Supplement: Boost Plus Vanilla TID     Evaluation of Received Nutrients/Fluid Intake    Energy Calories Required: meeting needs  Protein Required: meeting needs  Fluid Required: meeting needs     Tolerance: tolerating     Nutrition Risk Screen     Nutrition Risk Screen: unintentional loss of 10 lbs or more in the past 2 mos    Nutrition/Diet History    Patient Reported Diet/Restrictions/Preferences: general     Food Preferences: Denies cultural, Yarsani, ethnic food preferences     Factors Affecting Nutritional Intake: decreased appetite    Labs/Tests/Procedures/Meds    Pertinent Labs Reviewed: reviewed  BMP  Lab Results   Component Value Date     2017    K 3.5 2017     2017    CO2 25 2017    BUN 33 (H) 2017    CREATININE 2.0 (H) 2017    CALCIUM 9.4 2017    ANIONGAP 10 2017     ESTGFRAFRICA 31 (A) 01/12/2017    EGFRNONAA 27 (A) 01/12/2017     Pertinent Medications Reviewed: reviewed  Pertinent Medications Comments: phenergan, abx, folic acid, lactulose KCL    Physical Findings    Overall Physical Appearance: obese  Oral/Mouth Cavity: tooth/teeth missing  Skin: other (see comments) (abd. incision)    Anthropometrics    Height (inches): 62.01 in  Weight Method: Stated  Weight (kg): 79.8 kg  Ideal Body Weight (IBW), Female: 110.05 lb  % Ideal Body Weight, Female (lb): 159.86 lb  BMI (kg/m2): 32.17  BMI Grade: 30 - 34.9- obesity - grade I    Weight Loss: unintentional    Estimated/Assessed Needs    Weight Used For Calorie Calculations: 81.2 kg (179 lb 0.2 oz)   Height (cm): 157.5 cm  Energy Need Method: [place]-St Jeor (7301-0759 kcal)  RMR ([place]-St. Jeor Equation): 1325.38  Weight Used For Protein Calculations: 81.2 kg (179 lb 0.2 oz)  Protein Requirements:  g  Fluid Need Method: RDA Method    Malnutrition (Undernutrition) Diagnosis    % Intake of Estimated Energy Needs: 0 - 25%  % Meal Intake: 25%    Nutrition Diagnosis    Nutrition Problem: Inadequate energy intake  Etiology/Related To: medical issues  Nutrition Diagnosis Signs/Symptoms As Evidenced By: 25% po intake/weight loss PTA  Nutrition Diagnosis Status: Continues    Monitor and Evaluation    Food and Nutrient Intake: energy intake  Food and Nutrient Adminstration: diet order, other (specify) (supplement order)  Anthropometric Measurements: weight, weight change  Biochemical Data, Medical Tests and Procedures: electrolyte and renal panel, glucose/endocrine profile  Nutrition-Focused Physical Findings: overall appearance    Nutrition Risk    Level of Risk:  (2x/week)    Nutrition Follow-Up    RD Follow-up?: Yes  01/16/2017

## 2017-01-12 NOTE — PLAN OF CARE
Problem: Patient Care Overview  Goal: Plan of Care Review  01/12/2017     Recommendations     Recommendation/Intervention: 1) Change oral supplement to Novasource Renal 2) Consider liberalizing diet  Goals: Patient will consume >=75% of meals and 100% of supplements  Nutrition Goal Status: new  Communication of RD Recs: other (comment) (physician's sticky note)     Iwona Hidalgo, MPH, RD, LDN

## 2017-01-12 NOTE — ASSESSMENT & PLAN NOTE
Pt reports she has stopped drinking for the past few weeks, maybe up to 2 months. Continued sobriety encouraged. No signs of withdrawal. Supplement with thiamine and folic acid.

## 2017-01-12 NOTE — PROGRESS NOTES
No complaint of pain, no apparent distress. resp even and nonlabored. All lung fields clear. s1s2 audible in regular rhythm. aao x4. No skin breakdown noted. Telemetry sr. Follows all commands. abd distended. ruq gauze and tegaderm dsg is clean, dry and intact. Diaper is clean, dry and intact. Teds/scds to ble. r thigh bruise noted. Jaundiced skin. Cont pulse ox 99% ra. oob with assist only, verbalizes understanding, call bell in reach.

## 2017-01-12 NOTE — PROGRESS NOTES
[facility] Hospital Medicine  Progress Note    Patient Name: Tana Brewster  MRN: [id number]  Patient Class: IP- Inpatient   Admission Date: 1/8/2017  Length of Stay: 2 days  Attending Physician: Rupali Birmingham MD  Primary Care Provider: Pablo Browne MD        Subjective:     Principal Problem:Generalized weakness    HPI:  Tana Brewster is a 61 y.o. female that  has a past medical history of Crohn disease and Hypertension. Presents to [facility] Emergency Department complaining of generalized weakness.  She has a long-standing history of chronic alcoholism.  She states she has had subacute onset of generalized weakness that is progressively worsened over the last week.  Her symptoms are constant.  She thinks they are possibly exacerbated by decreased appetite and oral intake.  Associated symptoms include ataxia and bilateral lower extremity weakness.  Associated symptoms also include intermittent dysuria and she feels like she has a urinary tract infection.  She denies abdominal pain but does have some suprapubic discomfort.  She had a normal bowel movement this morning and denies melena, hematochezia, hematemesis, nausea, vomiting, chest pain, shortness of breath, syncope, vertigo, or tremors, fever, chills, or skin lesions or infections.    In the emergency department routine laboratory studies were obtained which demonstrated multiple abnormalities including evidence of urinary tract infection and electrolyte abnormalities.      Hospital medicine has been asked to admit for further evaluation and treatment.         Hospital Course:  60 y/o with cirrhosis due to ETOH abuse who was admitted for encephalopathy, UTI and electrolyte abnormalities. Pt was treated with Rocephin for UTI and had ascites evaluated/treated with paracentesis, and mentation cleared. Pt still with persistent hypokalemia. Pt with marked physical debility and deconditioning requiring therapy.    Interval  History: Pt report feeling better, but feel very weak.    Review of Systems   Constitutional: Negative for chills and fever.   Eyes: Negative for visual disturbance.   Respiratory: Negative for shortness of breath.    Cardiovascular: Negative for chest pain.     Objective:     Vital signs: Reviewed.  Intake/Output Summary (Last 24 hours): Reviewed     Physical Exam   Constitutional: She is oriented to person, place, and time.   Chronically ill appearing.   HENT:   Head: Normocephalic and atraumatic.   Eyes: EOM are normal. Pupils are equal, round, and reactive to light.   Neck: Normal range of motion. Neck supple.   Cardiovascular: Normal rate and regular rhythm.    Pulmonary/Chest: Effort normal and breath sounds normal.   Abdominal:   Soft but distended, NT, no rebound or guarding, +ascites, +BS.   Musculoskeletal: Normal range of motion.   Neurological: She is alert and oriented to person, place, and time.   Skin: Skin is warm and dry.       Significant Labs: All pertinent labs within the past 24 hours have been reviewed.    Significant Imaging: I have reviewed and interpreted all pertinent imaging results/findings within the past 24 hours.    Assessment/Plan:      * Generalized weakness  Multifactorial due to ARF, UTI, chronic alcoholism, malnutrition with resultant deconditioning, debility, functional impaired mobility and balance. Will treat underlying conditions and continue therapy with PT/OT. Pt will need rehab.      Acute on chronic renal failure  Possible prerenal etiology vs hepatorenal, improving with IVF therefore more likely prerenal, continue current care and monitor.    Hypokalemia  Replace judiciously given ARF, monitor and follow Mg level as well.      Hypomagnesemia  Repleted and monitor.      Encephalopathy, metabolic  Resolved. Multifactorial, due to infectious process, and hepatic encephalopathy.    Ascites due to alcoholic cirrhosis  Paracentesis by IR on 1/9. Improved.      Acute cystitis  without hematuria  Due to E. Coli. Continue to treat with Rocephin for now.      Chronic alcohol abuse  Pt reports she has stopped drinking for the past few weeks, maybe up to 2 months. Continued sobriety encouraged. No signs of withdrawal. Supplement with thiamine and folic acid.    Elevated troponin  Followed markers, not consistent to ACS.      VTE Risk Mitigation         Ordered     Medium Risk of VTE  Once      01/08/17 2145     Place sequential compression device  Until discontinued      01/08/17 2145     Place SARAH hose  Until discontinued      01/08/17 2145          Rupali Birmingham MD  Department of Hospital Medicine   [facility]

## 2017-01-12 NOTE — PT/OT/SLP PROGRESS
Occupational Therapy  Treatment    Tanajayshree Brewster   MRN: [id number]   Admitting Diagnosis: Generalized weakness    OT Date of Treatment: 17   OT Start Time: 1354  OT Stop Time: 1417  OT Total Time (min): 23 min    Billable Minutes:  Self Care/Home Management 15 and Therapeutic Exercise 8    General Precautions: Standard, fall  Orthopedic Precautions: N/A  Braces: N/A    Do you have any cultural, spiritual, Oriental orthodox conflicts, given your current situation?: no    Subjective:  Communicated with nurse prior to session.      Pain Ratin/10      Pain Rating Post-Intervention: 0/10    Objective:  Patient found with: telemetry, oxygen     Functional Mobility:  Bed Mobility:  Supine to Sit: Minimum Assistance    Transfers:   Sit <> Stand Assistance: Contact Guard Assistance  Sit <> Stand Assistive Device: Rolling Walker  Bed <> Chair Technique: Stand Pivot  Bed <> Chair Transfer Assistance: Minimum Assistance  Bed <> Chair Assistive Device: No Assistive Device    Functional Ambulation: NT    Activities of Daily Living:   Grooming Position: bedside chair  Grooming Level of Assistance: Minimum assistance        Balance:   Static Sit: GOOD+: Takes MAXIMAL challenges from all directions.    Dynamic Sit: GOOD: Maintains balance through MODERATE excursions of active trunk movement  Static Stand: FAIR: Maintains without assist but unable to take challenges  Dynamic stand: FAIR: Needs CONTACT GUARD during gait    Therapeutic Activities and Exercises:  Patient sat EOB and stood 2 mins before transfer to BS chair.  Patient completed grooming and BUE exs 1 times 10 for shoulders scaption, and elbow flexion/extension and mild resistance.    AM-PAC 6 CLICK ADL   How much help from another person does this patient currently need?   1 = Unable, Total/Dependent Assistance  2 = A lot, Maximum/Moderate Assistance  3 = A little, Minimum/Contact Guard/Supervision  4 = None, Modified [place]/Independent    Putting on and taking off  regular lower body clothing? : 2  Bathing (including washing, rinsing, drying)?: 2  Toileting, which includes using toilet, bedpan, or urinal? : 2  Putting on and taking off regular upper body clothing?: 3  Taking care of personal grooming such as brushing teeth?: 3  Eating meals?: 3  Total Score: 15     AM-PAC Raw Score CMS G-Code Modifier Level of Impairment Assistance   6 % Total / Unable   7 - 9 CM 80 - 100% Maximal Assist   10 - 14 CL 60 - 80% Moderate Assist   15 - 19 CK 40 - 60% Moderate Assist   20 - 22 CJ 20 - 40% Minimal Assist   23 CI 1-20% SBA / CGA   24 CH 0% Independent/ Mod I     Patient left up in chair with all lines intact, call button in reach and patient's mother present    ASSESSMENT:  Tana Brewster is a 61 y.o. female with a medical diagnosis of Generalized weakness and presents with good motivation, good participation, dizziness on stance, gait instability during transfer and B shulder weakness affecting hair grooming skills this date.    Rehab identified problem list/impairments: Rehab identified problem list/impairments: weakness, impaired endurance, impaired self care skills, impaired functional mobilty, decreased coordination, impaired balance, decreased lower extremity function, impaired cardiopulmonary response to activity    Rehab potential is good.    Activity tolerance: Good    Discharge recommendations: Discharge Facility/Level Of Care Needs: rehabilitation facility     Barriers to discharge: Barriers to Discharge: Inaccessible home environment, Decreased caregiver support    Equipment recommendations: none     GOALS:   Occupational Therapy Goals        Problem: Occupational Therapy Goal    Goal Priority Disciplines Outcome Interventions   Occupational Therapy Goal     OT, PT/OT Ongoing (interventions implemented as appropriate)    Description:  Goals to be met by: 1/21/2017     Patient will increase functional independence with ADLs by performing:    Feeding with Set-up  Assistance.  UE Dressing with Minimal Assistance.  LE Dressing with Moderate Assistance.  Grooming while standing at sink with Set-up Assistance.  Toileting from toilet with Moderate Assistance for hygiene and clothing management.   Toilet transfer to bedside commode with Supervision.  Upper extremity exercise program x15 reps per handout, with assistance as needed.                Plan:  Patient to be seen 5 x/week to address the above listed problems via self-care/home management, therapeutic activities, therapeutic exercises  Plan of Care expires:  1/20/17  Plan of Care reviewed with: patient         Sapphire RENE Diaz, OT  01/12/2017

## 2017-01-12 NOTE — ASSESSMENT & PLAN NOTE
Initial there was concern for possible prerenal etiology vs hepatorenal syndrome but given improving creatinine with IVF, therefore more likely prerenal, getting close to baseline, continue current care and monitor.

## 2017-01-12 NOTE — ASSESSMENT & PLAN NOTE
Multifactorial due to ARF, UTI, chronic alcoholism, malnutrition with resultant deconditioning, debility, functional impaired mobility and balance. Will treat underlying conditions and continue therapy with PT/OT. Pt will need rehab.

## 2017-01-13 LAB
ALBUMIN SERPL BCP-MCNC: 1.9 G/DL
ALP SERPL-CCNC: 414 U/L
ALT SERPL W/O P-5'-P-CCNC: 52 U/L
ANION GAP SERPL CALC-SCNC: 9 MMOL/L
AST SERPL-CCNC: 173 U/L
BACTERIA BLD CULT: NORMAL
BACTERIA BLD CULT: NORMAL
BASOPHILS # BLD AUTO: 0.03 K/UL
BASOPHILS NFR BLD: 0.5 %
BILIRUB SERPL-MCNC: 2 MG/DL
BUN SERPL-MCNC: 33 MG/DL
CALCIUM SERPL-MCNC: 9.4 MG/DL
CHLORIDE SERPL-SCNC: 107 MMOL/L
CO2 SERPL-SCNC: 25 MMOL/L
CREAT SERPL-MCNC: 1.9 MG/DL
DIFFERENTIAL METHOD: ABNORMAL
EOSINOPHIL # BLD AUTO: 0.2 K/UL
EOSINOPHIL NFR BLD: 3.3 %
ERYTHROCYTE [DISTWIDTH] IN BLOOD BY AUTOMATED COUNT: 15.7 %
EST. GFR  (AFRICAN AMERICAN): 33 ML/MIN/1.73 M^2
EST. GFR  (NON AFRICAN AMERICAN): 28 ML/MIN/1.73 M^2
GLUCOSE SERPL-MCNC: 100 MG/DL
HCT VFR BLD AUTO: 30.3 %
HGB BLD-MCNC: 10 G/DL
LYMPHOCYTES # BLD AUTO: 0.8 K/UL
LYMPHOCYTES NFR BLD: 12.7 %
MCH RBC QN AUTO: 31.2 PG
MCHC RBC AUTO-ENTMCNC: 33 %
MCV RBC AUTO: 94 FL
MONOCYTES # BLD AUTO: 0.8 K/UL
MONOCYTES NFR BLD: 12.9 %
NEUTROPHILS # BLD AUTO: 4.5 K/UL
NEUTROPHILS NFR BLD: 70.6 %
PLATELET # BLD AUTO: 128 K/UL
PMV BLD AUTO: 11.4 FL
POTASSIUM SERPL-SCNC: 3.1 MMOL/L
PROT SERPL-MCNC: 6.6 G/DL
RBC # BLD AUTO: 3.21 M/UL
SODIUM SERPL-SCNC: 141 MMOL/L
WBC # BLD AUTO: 6.3 K/UL

## 2017-01-13 PROCEDURE — 97535 SELF CARE MNGMENT TRAINING: CPT

## 2017-01-13 PROCEDURE — 21400001 HC TELEMETRY ROOM

## 2017-01-13 PROCEDURE — 97116 GAIT TRAINING THERAPY: CPT

## 2017-01-13 PROCEDURE — 97530 THERAPEUTIC ACTIVITIES: CPT

## 2017-01-13 PROCEDURE — 25000003 PHARM REV CODE 250: Performed by: EMERGENCY MEDICINE

## 2017-01-13 PROCEDURE — 80053 COMPREHEN METABOLIC PANEL: CPT

## 2017-01-13 PROCEDURE — 25000003 PHARM REV CODE 250: Performed by: HOSPITALIST

## 2017-01-13 PROCEDURE — 85025 COMPLETE CBC W/AUTO DIFF WBC: CPT

## 2017-01-13 PROCEDURE — 36415 COLL VENOUS BLD VENIPUNCTURE: CPT

## 2017-01-13 RX ORDER — POTASSIUM CHLORIDE 20 MEQ/1
40 TABLET, EXTENDED RELEASE ORAL ONCE
Status: COMPLETED | OUTPATIENT
Start: 2017-01-13 | End: 2017-01-13

## 2017-01-13 RX ADMIN — RIFAXIMIN 550 MG: 550 TABLET ORAL at 09:01

## 2017-01-13 RX ADMIN — CIPROFLOXACIN HYDROCHLORIDE 250 MG: 250 TABLET, FILM COATED ORAL at 10:01

## 2017-01-13 RX ADMIN — RIFAXIMIN 550 MG: 550 TABLET ORAL at 10:01

## 2017-01-13 RX ADMIN — LACTULOSE 30 G: 10 SOLUTION ORAL at 05:01

## 2017-01-13 RX ADMIN — FOLIC ACID 1 MG: 1 TABLET ORAL at 10:01

## 2017-01-13 RX ADMIN — LACTULOSE 30 G: 10 SOLUTION ORAL at 09:01

## 2017-01-13 RX ADMIN — LACTULOSE 30 G: 10 SOLUTION ORAL at 03:01

## 2017-01-13 RX ADMIN — Medication 3 ML: at 03:01

## 2017-01-13 RX ADMIN — POTASSIUM CHLORIDE 40 MEQ: 1500 TABLET, EXTENDED RELEASE ORAL at 10:01

## 2017-01-13 RX ADMIN — CIPROFLOXACIN HYDROCHLORIDE 250 MG: 250 TABLET, FILM COATED ORAL at 09:01

## 2017-01-13 RX ADMIN — Medication 3 ML: at 09:01

## 2017-01-13 RX ADMIN — Medication 3 ML: at 05:01

## 2017-01-13 NOTE — ASSESSMENT & PLAN NOTE
Due to E. Coli. Initially treated with Rocephin, then switched to PO Cipro based on sensitivities, last dose on 1/18/17.

## 2017-01-13 NOTE — ASSESSMENT & PLAN NOTE
Paracentesis by IR on 1/9 with >4 L removed. Improved. Pt's abdomen is chronically distended due to chronic ascites that persists. She does not have any abdominal pain and has multiple bowel movements that is expected due to lactulose. Stable.

## 2017-01-13 NOTE — PLAN OF CARE
01/13/17 1411   Discharge Reassessment   Assessment Type Discharge Planning Reassessment   Can the patient answer the patient profile reliably? Yes, cognitively intact   How does the patient rate their overall health at the present time? Fair   Discharge plan remains the same: Yes   Provided patient/caregiver education on the expected discharge date and the discharge plan Yes   Discharge Plan A Rehab   Change in patient condition or support system Yes   Patient choice form signed by patient/caregiver Yes   Explained to the the patient/caregiver why the discharge planned changed: Yes   Involved the patient/caregiver in establishing a new discharge plan: Yes     [facility] Monday. [facility] asked Loida and doctor to review pt notes per Dr. Birmingham request to determine if pt is able to transfer today. Loida informed [facility], stated pt is not medically stable and he will not accept pt until Monday. [facility] contacted [facility] but was informed they do not take medicaid. [facility]AN faxed pt clinicals to [facility], however, per Chetna they will not be able to accept pt until Tuesday as Monday is a holiday.

## 2017-01-13 NOTE — PLAN OF CARE
Problem: Occupational Therapy Goal  Goal: Occupational Therapy Goal  Goals to be met by: 1/21/2017     Patient will increase functional independence with ADLs by performing:    Feeding with Set-up Assistance.  UE Dressing with Minimal Assistance. --MET 1/13  LE Dressing with Moderate Assistance.  Grooming while standing at sink with Set-up Assistance.  Toileting from toilet with Moderate Assistance for hygiene and clothing management.   Toilet transfer to bedside commode with Supervision.  Upper extremity exercise program x15 reps per handout, with assistance as needed.   Outcome: Ongoing (interventions implemented as appropriate)  Patient remains limited by decreased strength, poor endurance, and decreased ax tolerance. Tolerated bedside chair t/f with encouragement. Remains appropriate IPR candidate to address functional deficits and return to PLOF. Cont OT per POC.

## 2017-01-13 NOTE — ASSESSMENT & PLAN NOTE
Pt reports she has stopped drinking for the past 2 months. Continued sobriety encouraged. No signs of withdrawal and she is out of window for withdrawal. Supplement with thiamine and folic acid. Stable.

## 2017-01-13 NOTE — PROGRESS NOTES
"PT care assumed, pt aaox4  Pt smiling today with yellow skin tone. Pt has telemetry monitor in use with alarms. Pt with o2 in use at 3 liters n/c. continuous pulse ox in place showing in the 90"s.pt abd distended with dsg in place to rt upper quad of abd.. Saline lock in place to rt forearm site clear.pt with brusing noted to rt thigh. Safety maintained with bed alarm and personal items and call bell for nurse is within reach.  "

## 2017-01-13 NOTE — PROGRESS NOTES
[facility] Hospital Medicine  Progress Note    Patient Name: Tana Brewster  MRN: [id number]  Patient Class: IP- Inpatient   Admission Date: 1/8/2017  Length of Stay: 5 days  Attending Physician: Rupali Birmingham MD  Primary Care Provider: Pablo Browne MD        Subjective:     Principal Problem:Generalized weakness    HPI:  Tana Brewster is a 61 y.o. female that  has a past medical history of Crohn disease and Hypertension. Presents to [facility] Emergency Department complaining of generalized weakness.  She has a long-standing history of chronic alcoholism.  She states she has had subacute onset of generalized weakness that is progressively worsened over the last week.  Her symptoms are constant.  She thinks they are possibly exacerbated by decreased appetite and oral intake.  Associated symptoms include ataxia and bilateral lower extremity weakness.  Associated symptoms also include intermittent dysuria and she feels like she has a urinary tract infection.  She denies abdominal pain but does have some suprapubic discomfort.  She had a normal bowel movement this morning and denies melena, hematochezia, hematemesis, nausea, vomiting, chest pain, shortness of breath, syncope, vertigo, or tremors, fever, chills, or skin lesions or infections.    In the emergency department routine laboratory studies were obtained which demonstrated multiple abnormalities including evidence of urinary tract infection and electrolyte abnormalities.      Hospital medicine has been asked to admit for further evaluation and treatment.         Hospital Course:  60 y/o with cirrhosis due to ETOH abuse who was admitted for encephalopathy, UTI and electrolyte abnormalities. Pt was treated with Rocephin for UTI and had ascites evaluated/treated with paracentesis, and mentation cleared. Pt had persistent hypokalemia now improved. Pt with marked physical debility and deconditioning requiring therapy.    Interval  History: Pt report feeling good, no abdominal pain, having 3-4 bowels a day as expected, no N/V, and no increase in abdominal distention.    Review of Systems   Constitutional: Negative for chills and fever.   Eyes: Negative for visual disturbance.   Respiratory: Negative for shortness of breath.    Cardiovascular: Negative for chest pain.     Objective:     Vital Signs (Most Recent):  Temp: 97.3 °F (36.3 °C) (01/12/17 1130)  Pulse: 77 (01/12/17 1130)  Resp: 18 (01/12/17 1130)  BP: (!) 108/54 (01/12/17 1130)  SpO2: 97 % (01/12/17 1130) Vital Signs (24h Range):  Temp:  [97.3 °F (36.3 °C)-98.1 °F (36.7 °C)] 97.3 °F (36.3 °C)  Pulse:  [72-78] 77  Resp:  [18-19] 18  SpO2:  [95 %-98 %] 97 %  BP: ()/(51-55) 108/54     Weight: 79.8 kg (176 lb)  Body mass index is 32.19 kg/(m^2).    Intake/Output Summary (Last 24 hours) at 01/12/17 1520  Last data filed at 01/12/17 1446   Gross per 24 hour   Intake             1370 ml   Output                0 ml   Net             1370 ml      Physical Exam   Constitutional: She is oriented to person, place, and time.   Chronically ill appearing, but more energetic and appropriate.   HENT:   Head: Normocephalic and atraumatic.   Eyes: EOM are normal. Pupils are equal, round, and reactive to light.   Neck: Normal range of motion. Neck supple.   Cardiovascular: Normal rate and regular rhythm.    Pulmonary/Chest: Effort normal and breath sounds normal.   Abdominal:   Soft but distended at her baseline, NT, no rebound or guarding, +ascites, +BS.   Musculoskeletal: Normal range of motion.   Neurological: She is alert and oriented to person, place, and time.   Skin: Skin is warm and dry.       Significant Labs: All pertinent labs within the past 24 hours have been reviewed.    Significant Imaging: I have reviewed and interpreted all pertinent imaging results/findings within the past 24 hours.    Assessment/Plan:      Acute cystitis without hematuria  Due to E. Coli. Initially treated with  Rocephin, then switched to PO Cipro based on sensitivities, last dose on 1/18/17.    Acute on chronic renal failure  Initial there was concern for possible prerenal etiology vs hepatorenal syndrome but given improving creatinine with IVF, more likely prerenal. IVF stopped, and creatinine steadily improving. It is unclear what her baseline is at this time but we are likely getting close to her baseline. Continue current care and monitor.    * Generalized weakness  Multifactorial due to ARF, UTI, chronic alcoholism, malnutrition with resultant deconditioning, debility, functional impaired mobility and balance. Treated underlying conditions with steady improvement, and continue therapy with PT/OT. Pt making good progress and is motivated, excellent rehab candidate.    Hypokalemia  Replete. Replaced judiciously given ARF, monitor.    Hypomagnesemia  Repleted and monitor.    Encephalopathy, metabolic  Resolved. Multifactorial, due to infectious process, and hepatic encephalopathy; at baseline.    Ascites due to alcoholic cirrhosis  Paracentesis by IR on 1/9 with >4 L removed. Improved. Pt's abdomen is chronically distended due to chronic ascites that persists. She does not have any abdominal pain and has multiple bowel movements that is expected due to lactulose. Stable.    Chronic alcohol abuse  Pt reports she has stopped drinking for the past 2 months. Continued sobriety encouraged. No signs of withdrawal and she is out of window for withdrawal. Supplement with thiamine and folic acid. Stable.    Elevated troponin  Followed markers, not consistent to ACS.  No further workup needed.      VTE Risk Mitigation         Ordered     Medium Risk of VTE  Once      01/08/17 2145     Place sequential compression device  Until discontinued      01/08/17 2145     Place SARAH hose  Until discontinued      01/08/17 2145          Rupali Birmingham MD  Department of Hospital Medicine   [facility]

## 2017-01-13 NOTE — PT/OT/SLP PROGRESS
Occupational Therapy  Treatment    Tanajayshree Brewster   MRN: [id number]   Admitting Diagnosis: Generalized weakness    OT Date of Treatment: 17   OT Start Time: 1008  OT Stop Time: 1044  OT Total Time (min): 36 min    Billable Minutes:  Self Care/Home Management 24 and Therapeutic Activity 12    General Precautions: Standard, fall  Orthopedic Precautions: N/A  Braces: N/A    Do you have any cultural, spiritual, Restorationist conflicts, given your current situation?: no    Subjective:  Communicated with nurseDerrick prior to session.    Pain Ratin/10              Pain Rating Post-Intervention: 0/10    Objective:  Patient found with: telemetry, oxygen     Functional Mobility:  Bed Mobility:  Rolling/Turning to Left: Minimum assistance  Rolling/Turning Right: Minimum assistance  Scooting/Bridging: Contact Guard Assistance  Supine to Sit: Minimum Assistance    Transfers:   Sit <> Stand Assistance: Contact Guard Assistance  Sit <> Stand Assistive Device: Rolling Walker  Bed <> Chair Transfer Assistance: Minimum Assistance  Bed <> Chair Assistive Device: Rolling Walker    Functional Ambulation: N/A    Activities of Daily Living:  UE Dressing Level of Assistance: Minimum assistance  Grooming Position: Seated, bedside chair  Grooming Level of Assistance: Stand by assistance  Toileting Where Assessed: Bed level  Toileting Level of Assistance: Total assistance    Balance:   Static Sit: GOOD: Takes MODERATE challenges from all directions  Dynamic Sit: FAIR+: Maintains balance through MINIMAL excursions of active trunk motion  Static Stand: GOOD-: Takes MODERATE challenges from all directions inconsistently  Dynamic stand: FAIR: Needs CONTACT GUARD during gait    Therapeutic Activities and Exercises:  Patient with soiled brief on arrival - total (A) for diaper change. Min (A) to roll L and R. Sup > sit with Min (A) for trunk. SBA/CGA to scoot to EOB with increased time and rest breaks. Min (A) to don gown as robe. Sit > stand  with CGA - patient able to step to bedside chair with SBA, but required CGA and VCs for technique. Patient with difficulty following - quick descent. Completed oral care and facial hygiene with s/u (A). Patient left reclined with all needs in reach.     AM-PAC 6 CLICK ADL   How much help from another person does this patient currently need?   1 = Unable, Total/Dependent Assistance  2 = A lot, Maximum/Moderate Assistance  3 = A little, Minimum/Contact Guard/Supervision  4 = None, Modified [place]/Independent    Putting on and taking off regular lower body clothing? : 2  Bathing (including washing, rinsing, drying)?: 2  Toileting, which includes using toilet, bedpan, or urinal? : 2  Putting on and taking off regular upper body clothing?: 3  Taking care of personal grooming such as brushing teeth?: 4  Eating meals?: 4  Total Score: 17     AM-PAC Raw Score CMS G-Code Modifier Level of Impairment Assistance   6 % Total / Unable   7 - 9 CM 80 - 100% Maximal Assist   10 - 14 CL 60 - 80% Moderate Assist   15 - 19 CK 40 - 60% Moderate Assist   20 - 22 CJ 20 - 40% Minimal Assist   23 CI 1-20% SBA / CGA   24 CH 0% Independent/ Mod I     Patient left up in chair with all lines intact, call button in reach and nurseDerrick notified    ASSESSMENT:  Tana Brewster is a 61 y.o. female with a medical diagnosis of Generalized weakness   Patient remains limited by decreased strength, poor endurance, and decreased ax tolerance. Tolerated bedside chair t/f with encouragement. Remains appropriate IPR candidate to address functional deficits and return to PLOF. Cont OT per POC.    Rehab identified problem list/impairments: Rehab identified problem list/impairments: weakness, impaired endurance, impaired self care skills, impaired functional mobilty, gait instability, impaired balance, decreased safety awareness, decreased lower extremity function, decreased upper extremity function, impaired cardiopulmonary response to  activity    Rehab potential is excellent.    Activity tolerance: Fair+    Discharge recommendations: Discharge Facility/Level Of Care Needs: rehabilitation facility     Barriers to discharge: Barriers to Discharge: Inaccessible home environment, Decreased caregiver support    Equipment recommendations: none     GOALS:   Occupational Therapy Goals        Problem: Occupational Therapy Goal    Goal Priority Disciplines Outcome Interventions   Occupational Therapy Goal     OT, PT/OT Ongoing (interventions implemented as appropriate)    Description:  Goals to be met by: 1/21/2017     Patient will increase functional independence with ADLs by performing:    Feeding with Set-up Assistance.  UE Dressing with Minimal Assistance. --MET 1/13  LE Dressing with Moderate Assistance.  Grooming while standing at sink with Set-up Assistance.  Toileting from toilet with Moderate Assistance for hygiene and clothing management.   Toilet transfer to bedside commode with Supervision.  Upper extremity exercise program x15 reps per handout, with assistance as needed.                 Plan:  Patient to be seen 5 x/week to address the above listed problems via self-care/home management, therapeutic exercises, therapeutic activities  Plan of Care expires:    Plan of Care reviewed with: patient         SHELTON Marx  01/13/2017

## 2017-01-13 NOTE — ASSESSMENT & PLAN NOTE
Multifactorial due to ARF, UTI, chronic alcoholism, malnutrition with resultant deconditioning, debility, functional impaired mobility and balance. Treated underlying conditions with steady improvement, and continue therapy with PT/OT. Pt making good progress and is motivated, excellent rehab candidate.

## 2017-01-13 NOTE — ASSESSMENT & PLAN NOTE
Initial there was concern for possible prerenal etiology vs hepatorenal syndrome but given improving creatinine with IVF, more likely prerenal. IVF stopped, and creatinine steadily improving. It is unclear what her baseline is at this time but we are likely getting close to her baseline. Continue current care and monitor.

## 2017-01-14 LAB
ALBUMIN SERPL BCP-MCNC: 1.9 G/DL
ALP SERPL-CCNC: 428 U/L
ALT SERPL W/O P-5'-P-CCNC: 52 U/L
ANION GAP SERPL CALC-SCNC: 9 MMOL/L
AST SERPL-CCNC: 161 U/L
BASOPHILS # BLD AUTO: 0.02 K/UL
BASOPHILS NFR BLD: 0.3 %
BILIRUB SERPL-MCNC: 2.1 MG/DL
BUN SERPL-MCNC: 34 MG/DL
CALCIUM SERPL-MCNC: 9.3 MG/DL
CHLORIDE SERPL-SCNC: 108 MMOL/L
CO2 SERPL-SCNC: 23 MMOL/L
CREAT SERPL-MCNC: 1.7 MG/DL
DIFFERENTIAL METHOD: ABNORMAL
EOSINOPHIL # BLD AUTO: 0 K/UL
EOSINOPHIL NFR BLD: 0 %
ERYTHROCYTE [DISTWIDTH] IN BLOOD BY AUTOMATED COUNT: 16.1 %
EST. GFR  (AFRICAN AMERICAN): 37 ML/MIN/1.73 M^2
EST. GFR  (NON AFRICAN AMERICAN): 32 ML/MIN/1.73 M^2
GLUCOSE SERPL-MCNC: 94 MG/DL
HCT VFR BLD AUTO: 29.8 %
HGB BLD-MCNC: 9.7 G/DL
LYMPHOCYTES # BLD AUTO: 1 K/UL
LYMPHOCYTES NFR BLD: 15.2 %
MCH RBC QN AUTO: 31.2 PG
MCHC RBC AUTO-ENTMCNC: 32.6 %
MCV RBC AUTO: 96 FL
MONOCYTES # BLD AUTO: 0.9 K/UL
MONOCYTES NFR BLD: 12.4 %
NEUTROPHILS # BLD AUTO: 4.9 K/UL
NEUTROPHILS NFR BLD: 72.1 %
PLATELET # BLD AUTO: 123 K/UL
PMV BLD AUTO: 11.4 FL
POTASSIUM SERPL-SCNC: 3.5 MMOL/L
PROT SERPL-MCNC: 6.5 G/DL
RBC # BLD AUTO: 3.11 M/UL
SODIUM SERPL-SCNC: 140 MMOL/L
WBC # BLD AUTO: 6.83 K/UL

## 2017-01-14 PROCEDURE — 97116 GAIT TRAINING THERAPY: CPT

## 2017-01-14 PROCEDURE — 25000003 PHARM REV CODE 250: Performed by: INTERNAL MEDICINE

## 2017-01-14 PROCEDURE — 85025 COMPLETE CBC W/AUTO DIFF WBC: CPT

## 2017-01-14 PROCEDURE — 97110 THERAPEUTIC EXERCISES: CPT

## 2017-01-14 PROCEDURE — 21400001 HC TELEMETRY ROOM

## 2017-01-14 PROCEDURE — 25000003 PHARM REV CODE 250: Performed by: HOSPITALIST

## 2017-01-14 PROCEDURE — 80053 COMPREHEN METABOLIC PANEL: CPT

## 2017-01-14 PROCEDURE — 25000003 PHARM REV CODE 250: Performed by: EMERGENCY MEDICINE

## 2017-01-14 PROCEDURE — 36415 COLL VENOUS BLD VENIPUNCTURE: CPT

## 2017-01-14 RX ORDER — ESCITALOPRAM OXALATE 10 MG/1
10 TABLET ORAL DAILY
Status: DISCONTINUED | OUTPATIENT
Start: 2017-01-14 | End: 2017-01-23 | Stop reason: HOSPADM

## 2017-01-14 RX ADMIN — ESCITALOPRAM OXALATE 10 MG: 10 TABLET ORAL at 11:01

## 2017-01-14 RX ADMIN — CIPROFLOXACIN HYDROCHLORIDE 250 MG: 250 TABLET, FILM COATED ORAL at 09:01

## 2017-01-14 RX ADMIN — LACTULOSE 30 G: 10 SOLUTION ORAL at 05:01

## 2017-01-14 RX ADMIN — CIPROFLOXACIN HYDROCHLORIDE 250 MG: 250 TABLET, FILM COATED ORAL at 08:01

## 2017-01-14 RX ADMIN — RIFAXIMIN 550 MG: 550 TABLET ORAL at 09:01

## 2017-01-14 RX ADMIN — Medication 3 ML: at 05:01

## 2017-01-14 RX ADMIN — Medication 3 ML: at 10:01

## 2017-01-14 RX ADMIN — RIFAXIMIN 550 MG: 550 TABLET ORAL at 08:01

## 2017-01-14 RX ADMIN — FOLIC ACID 1 MG: 1 TABLET ORAL at 08:01

## 2017-01-14 NOTE — PT/OT/SLP PROGRESS
Physical Therapy  Treatment    Tana Ney   MRN: [id number]   Admitting Diagnosis: Generalized weakness    PT Received On: 17  PT Start Time: 0930     PT Stop Time: 1000    PT Total Time (min): 30 min       Billable Minutes:  Gait [id number] and Therapeutic Exercise 15    Treatment Type: Treatment  PT/PTA: PTA     PTA Visit Number: 4       General Precautions: Standard, fall  Orthopedic Precautions: N/A   Braces: N/A         Subjective:  Communicated with nurse Vera prior to session.  Pt agreeable to therapy.    Pain Ratin/10              Pain Rating Post-Intervention: 0/10    Objective:   Patient found with: pulse ox (continuous), telemetry, oxygen ( 1L)    Functional Mobility:  Bed Mobility:   Rolling/Turning to Left: Stand by assistance  Rolling/Turning Right: Stand by assistance  Scooting/Bridging: Stand by Assistance  Supine to Sit: Minimum Assistance    Transfers: x 3 trials  Sit <> Stand Assistance: Minimum Assistance ( from bedside chair) , Contact Guard Assistance ( from bed)   Sit <> Stand Assistive Device: Rolling Walker  Bed <> Chair Technique: Stand Pivot  Bed <> Chair Assistance: Contact Guard Assistance  Bed <> Chair Assistive Device: No Assistive Device( HHA)    Gait:   Gait Distance:  ~4 steps from bed to bedside chair, ~ 6 ft and 10 ft. Limited with gait training today 2* to decrese o2 sats = 78%  (1L O2 NC nurse Chuy notified ). Max VC's proper breathing technique . VC's for techinuqe and walker manageent.   Assistance 1: Contact Guard Assistance  Gait Assistive Device: Rolling walker  Gait Pattern: 3-point gait  Gait Deviation(s): decreased maverick, increased time in double stance, decreased velocity of limb motion, decreased step length, decreased swing-to-stance ratio      Balance:   Static Sit: FAIR+: Able to take MINIMAL challenges from all directions  Dynamic Sit: FAIR: Cannot move trunk without losing balance  Static Stand: FAIR: Maintains without assist but unable to take  challenges  Dynamic stand: FAIR: Needs CONTACT GUARD during gait     Therapeutic Activities and Exercises:  Pt performed bed mobility, transfer and gait training as above.   Pt performed seated BLE x 10 reps : heel/toes raise, LAQ, HS, pillow squeezes. Pt tolerated well.      AM-PAC 6 CLICK MOBILITY  How much help from another person does this patient currently need?   1 = Unable, Total/Dependent Assistance  2 = A lot, Maximum/Moderate Assistance  3 = A little, Minimum/Contact Guard/Supervision  4 = None, Modified [place]/Independent    Turning over in bed (including adjusting bedclothes, sheets and blankets)?: 4  Sitting down on and standing up from a chair with arms (e.g., wheelchair, bedside commode, etc.): 3  Moving from lying on back to sitting on the side of the bed?: 3  Moving to and from a bed to a chair (including a wheelchair)?: 4  Need to walk in hospital room?: 3  Climbing 3-5 steps with a railing?: 2  Total Score: 19    AM-PAC Raw Score CMS G-Code Modifier Level of Impairment Assistance   6 % Total / Unable   7 - 9 CM 80 - 100% Maximal Assist   10 - 14 CL 60 - 80% Moderate Assist   15 - 19 CK 40 - 60% Moderate Assist   20 - 22 CJ 20 - 40% Minimal Assist   23 CI 1-20% SBA / CGA   24 CH 0% Independent/ Mod I     Patient left up in chair with all lines intact, call button in reach and nurse  notified.    Assessment:  Tana Brewster is a 61 y.o. female with a medical diagnosis of Generalized weakness and presents with weakness, decreased endurance and functional mobility. Pt required frequent rest breaks and encouragement  during activity. Pt will benefit from further skilled therapy.    Rehab identified problem list/impairments: Rehab identified problem list/impairments: weakness, impaired endurance, impaired self care skills, impaired functional mobilty, gait instability, impaired balance, decreased lower extremity function, decreased coordination, impaired cardiopulmonary response to  activity    Rehab potential is fair.    Activity tolerance: Fair    Discharge recommendations: Discharge Facility/Level Of Care Needs: rehabilitation facility     Barriers to discharge: Barriers to Discharge: Decreased caregiver support    Equipment recommendations: Equipment Needed After Discharge: bedside commode.     GOALS:   Physical Therapy Goals        Problem: Physical Therapy Goal    Goal Priority Disciplines Outcome Goal Variances Interventions   Physical Therapy Goal     PT/OT, PT Ongoing (interventions implemented as appropriate)     Description:  Goals to be met by: 17    Patient will increase functional independence with mobility by performin. Supine to sit with supervision  2. Sit to stand transfer supervision with RW  3. Gait 50ft with RW and supervision  4. Lower extremity exercise program x30 reps per handout, with supervision                 PLAN:    Patient to be seen 6 x/week  to address the above listed problems via gait training, therapeutic activities, therapeutic exercises  Plan of Care expires: 17  Plan of Care reviewed with: patient         Mary Carrera, PTA  2017

## 2017-01-14 NOTE — PLAN OF CARE
Problem: Fall Risk (Adult)  Goal: Absence of Falls  Patient will demonstrate the desired outcomes by discharge/transition of care.   Outcome: Ongoing (interventions implemented as appropriate)    01/14/17 0523   Fall Risk (Adult)   Absence of Falls making progress toward outcome         Problem: Infection, Risk/Actual (Adult)  Goal: Infection Prevention/Resolution  Patient will demonstrate the desired outcomes by discharge/transition of care.   Outcome: Ongoing (interventions implemented as appropriate)    01/14/17 0523   Infection, Risk/Actual (Adult)   Infection Prevention/Resolution making progress toward outcome         Problem: Patient Care Overview  Goal: Plan of Care Review  Outcome: Ongoing (interventions implemented as appropriate)    01/14/17 0523   Coping/Psychosocial   Plan Of Care Reviewed With patient         Problem: Pressure Ulcer Risk (Gerhard Scale) (Adult,Obstetrics,Pediatric)  Goal: Skin Integrity  Patient will demonstrate the desired outcomes by discharge/transition of care.   Outcome: Ongoing (interventions implemented as appropriate)    01/14/17 0523   Pressure Ulcer Risk (Gerhard Scale) (Adult,Obstetrics,Pediatric)   Skin Integrity making progress toward outcome

## 2017-01-14 NOTE — PT/OT/SLP PROGRESS
"Physical Therapy  Treatment    Tanajayshree Brewster   MRN: [id number]   Admitting Diagnosis: Generalized weakness    PT Received On: 17  PT Start Time: 1345     PT Stop Time: 1415    PT Total Time (min): 30 min       Billable Minutes:  Gait [id number] and Therapeutic Activity 20    Treatment Type: Treatment  PT/PTA: PTA     PTA Visit Number: 3       General Precautions: Standard, fall  Orthopedic Precautions: N/A   Braces: N/A         Subjective:  Communicated with nsg prior to session.  "I need cleaning first."    Pain Ratin/10                   Objective:   Patient found with: peripheral IV, pulse ox (continuous), oxygen    Functional Mobility:  Bed Mobility:   Rolling/Turning to Left: Stand by assistance  Rolling/Turning Right: Stand by assistance  Scooting/Bridging: Stand by Assistance  Supine to Sit: Minimum Assistance, With side rail  Sit to Supine: Stand by Assistance    Transfers:  Sit <> Stand Assistance: Contact Guard Assistance  Sit <> Stand Assistive Device: Rolling Walker    Gait:   Gait Distance: 44 FT WITH 3L O2  Assistance 1: Contact Guard Assistance  Gait Assistive Device: Rolling walker  Gait Pattern: 3-point gait  Gait Deviation(s): decreased maverick, increased time in double stance, decreased velocity of limb motion, decreased step length, decreased stride length, decreased swing-to-stance ratio, decreased toe-to-floor clearance, decreased weight-shifting ability    Stairs:      Balance:   Static Sit: FAIR: Maintains without assist, but unable to take any challenges   Dynamic Sit: FAIR+: Maintains balance through MINIMAL excursions of active trunk motion  Static Stand: FAIR: Maintains without assist but unable to take challenges  Dynamic stand: FAIR: Needs CONTACT GUARD during gait     Therapeutic Activities and Exercises:  ROLL L<>R x 2 trials with min A using siderail for patrica care. Sat EOB x 10 min to doff and don clean gown. Static stand x 2 min with rw with BUE support    AM-PAC 6 CLICK " MOBILITY  How much help from another person does this patient currently need?   1 = Unable, Total/Dependent Assistance  2 = A lot, Maximum/Moderate Assistance  3 = A little, Minimum/Contact Guard/Supervision  4 = None, Modified [place]/Independent    Turning over in bed (including adjusting bedclothes, sheets and blankets)?: 3  Sitting down on and standing up from a chair with arms (e.g., wheelchair, bedside commode, etc.): 3  Moving from lying on back to sitting on the side of the bed?: 3  Moving to and from a bed to a chair (including a wheelchair)?: 3  Need to walk in hospital room?: 3  Climbing 3-5 steps with a railing?: 2  Total Score: 17    AM-PAC Raw Score CMS G-Code Modifier Level of Impairment Assistance   6 % Total / Unable   7 - 9 CM 80 - 100% Maximal Assist   10 - 14 CL 60 - 80% Moderate Assist   15 - 19 CK 40 - 60% Moderate Assist   20 - 22 CJ 20 - 40% Minimal Assist   23 CI 1-20% SBA / CGA   24 CH 0% Independent/ Mod I     Patient left supine with all lines intact, call button in reach, nsg notified and mother present.    Assessment:  Tana Brewster is a 61 y.o. female with a medical diagnosis of Generalized weakness and presents with increased weakness and poor endurance. Pt celine increased gt dist with 3L O2 with O2 SATS remaing between 97-99% throughout tx..    Rehab identified problem list/impairments: Rehab identified problem list/impairments: weakness, impaired functional mobilty, decreased safety awareness, impaired cardiopulmonary response to activity, gait instability, impaired endurance, impaired balance, decreased lower extremity function, impaired self care skills    Rehab potential is good.    Activity tolerance: Fair    Discharge recommendations: Discharge Facility/Level Of Care Needs: rehabilitation facility     Barriers to discharge:      Equipment recommendations: Equipment Needed After Discharge: none     GOALS:   Physical Therapy Goals        Problem: Physical Therapy Goal     Goal Priority Disciplines Outcome Goal Variances Interventions   Physical Therapy Goal     PT/OT, PT Ongoing (interventions implemented as appropriate)     Description:  Goals to be met by: 17    Patient will increase functional independence with mobility by performin. Supine to sit with supervision  2. Sit to stand transfer supervision with RW  3. Gait 50ft with RW and supervision  4. Lower extremity exercise program x30 reps per handout, with supervision                 PLAN:    Patient to be seen 6 x/week  to address the above listed problems via gait training, therapeutic activities, therapeutic exercises  Plan of Care expires: 17  Plan of Care reviewed with: patient         Leland Major, PTA  2017

## 2017-01-14 NOTE — SUBJECTIVE & OBJECTIVE
Interval History: No new issues.     Review of Systems   Constitutional: Negative for activity change.   HENT: Negative for congestion.    Respiratory: Negative for chest tightness and shortness of breath.    Cardiovascular: Negative for chest pain.   Gastrointestinal: Positive for abdominal distention.     Objective:     Vital Signs (Most Recent):  Temp: 98.2 °F (36.8 °C) (01/14/17 0817)  Pulse: 82 (01/14/17 0817)  Resp: 18 (01/14/17 0817)  BP: (!) 99/47 (01/14/17 0817)  SpO2: 99 % (01/14/17 0817) Vital Signs (24h Range):  Temp:  [97.8 °F (36.6 °C)-98.8 °F (37.1 °C)] 98.2 °F (36.8 °C)  Pulse:  [82-85] 82  Resp:  [16-18] 18  SpO2:  [95 %-100 %] 99 %  BP: ()/(47-60) 99/47     Weight: 77.9 kg (171 lb 11.2 oz)  Body mass index is 31.4 kg/(m^2).    Intake/Output Summary (Last 24 hours) at 01/14/17 1109  Last data filed at 01/13/17 1800   Gross per 24 hour   Intake              360 ml   Output                0 ml   Net              360 ml      Physical Exam   Constitutional: She appears well-developed and well-nourished.   Eyes: Pupils are equal, round, and reactive to light.   Cardiovascular: Normal rate.    Pulmonary/Chest: Effort normal.   Abdominal: Soft.   Neurological: She is alert.       Significant Labs:   CBC:   Recent Labs  Lab 01/13/17  0455 01/14/17  0431   WBC 6.30 6.83   HGB 10.0* 9.7*   HCT 30.3* 29.8*   * 123*       Significant Imaging:

## 2017-01-14 NOTE — PROGRESS NOTES
[facility] Hospital Medicine  Progress Note    Patient Name: Tana Brewster  MRN: [id number]  Patient Class: IP- Inpatient   Admission Date: 1/8/2017  Length of Stay: 6 days  Attending Physician: Augie Jackson MD  Primary Care Provider: Pablo Browne MD        Subjective:     Principal Problem:Generalized weakness    HPI:  Tana Brewster is a 61 y.o. female that  has a past medical history of Crohn disease and Hypertension. Presents to [facility] Emergency Department complaining of generalized weakness.  She has a long-standing history of chronic alcoholism.  She states she has had subacute onset of generalized weakness that is progressively worsened over the last week.  Her symptoms are constant.  She thinks they are possibly exacerbated by decreased appetite and oral intake.  Associated symptoms include ataxia and bilateral lower extremity weakness.  Associated symptoms also include intermittent dysuria and she feels like she has a urinary tract infection.  She denies abdominal pain but does have some suprapubic discomfort.  She had a normal bowel movement this morning and denies melena, hematochezia, hematemesis, nausea, vomiting, chest pain, shortness of breath, syncope, vertigo, or tremors, fever, chills, or skin lesions or infections.    In the emergency department routine laboratory studies were obtained which demonstrated multiple abnormalities including evidence of urinary tract infection and electrolyte abnormalities.      Hospital medicine has been asked to admit for further evaluation and treatment.         Hospital Course:  60 y/o with cirrhosis due to ETOH abuse who was admitted for encephalopathy, UTI and electrolyte abnormalities. Pt was treated with Rocephin for UTI and had ascites evaluated/treated with paracentesis, and mentation cleared. Pt had persistent hypokalemia now improved. Pt with marked physical debility and deconditioning requiring  therapy.    Interval History: No new issues.     Review of Systems   Constitutional: Negative for activity change.   HENT: Negative for congestion.    Respiratory: Negative for chest tightness and shortness of breath.    Cardiovascular: Negative for chest pain.   Gastrointestinal: Positive for abdominal distention.     Objective:     Vital Signs (Most Recent):  Temp: 98.2 °F (36.8 °C) (01/14/17 0817)  Pulse: 82 (01/14/17 0817)  Resp: 18 (01/14/17 0817)  BP: (!) 99/47 (01/14/17 0817)  SpO2: 99 % (01/14/17 0817) Vital Signs (24h Range):  Temp:  [97.8 °F (36.6 °C)-98.8 °F (37.1 °C)] 98.2 °F (36.8 °C)  Pulse:  [82-85] 82  Resp:  [16-18] 18  SpO2:  [95 %-100 %] 99 %  BP: ()/(47-60) 99/47     Weight: 77.9 kg (171 lb 11.2 oz)  Body mass index is 31.4 kg/(m^2).    Intake/Output Summary (Last 24 hours) at 01/14/17 1109  Last data filed at 01/13/17 1800   Gross per 24 hour   Intake              360 ml   Output                0 ml   Net              360 ml      Physical Exam   Constitutional: She appears well-developed and well-nourished.   Eyes: Pupils are equal, round, and reactive to light.   Cardiovascular: Normal rate.    Pulmonary/Chest: Effort normal.   Abdominal: Soft.   Neurological: She is alert.       Significant Labs:   CBC:   Recent Labs  Lab 01/13/17  0455 01/14/17  0431   WBC 6.30 6.83   HGB 10.0* 9.7*   HCT 30.3* 29.8*   * 123*       Significant Imaging:     Assessment/Plan:      * Generalized weakness  Multifactorial due to ARF, UTI, chronic alcoholism, malnutrition with resultant deconditioning, debility, functional impaired mobility and balance. Treated underlying conditions with steady improvement, and continue therapy with PT/OT. Pt making good progress and is motivated, excellent rehab candidate.      Acute on chronic renal failure  Initial there was concern for possible prerenal etiology vs hepatorenal syndrome but given improving creatinine with IVF, more likely prerenal. IVF stopped, and  creatinine steadily improving. It is unclear what her baseline is at this time but we are likely getting close to her baseline. Continue current care and monitor.    Hypokalemia  Resolved. Replaced judiciously given ARF, monitor.      Hypomagnesemia  Repleted and monitor.      Encephalopathy, metabolic  Resolved. Multifactorial, due to infectious process, and hepatic encephalopathy; at baseline.    Ascites due to alcoholic cirrhosis  Paracentesis by IR on 1/9 with >4 L removed. Improved. Pt's abdomen is chronically distended due to chronic ascites that persists. She does not have any abdominal pain and has multiple bowel movements that is expected due to lactulose. Stable.      Acute cystitis without hematuria  Due to E. Coli. Initially treated with Rocephin, then switched to PO Cipro based on sensitivities, last dose on 1/18/17.      Chronic alcohol abuse  Pt reports she has stopped drinking for the past 2 months. Continued sobriety encouraged. No signs of withdrawal and she is out of window for withdrawal. Supplement with thiamine and folic acid. Stable.    Elevated troponin  Followed markers, not consistent to ACS.  No further workup needed.      VTE Risk Mitigation         Ordered     Medium Risk of VTE  Once      01/08/17 2145     Place sequential compression device  Until discontinued      01/08/17 2145     Place SARAH hose  Until discontinued      01/08/17 2145        Continue current treatment. Evidently will go to Rehab likely on Tuesday. Will discuss with  on Mon.  E-coli UTI treatment through 1/18.      Augie Corbin MD  Department of Hospital Medicine   [facility]

## 2017-01-15 PROBLEM — F32.A DEPRESSION: Status: ACTIVE | Noted: 2017-01-15

## 2017-01-15 LAB
ALBUMIN SERPL BCP-MCNC: 1.9 G/DL
ALP SERPL-CCNC: 431 U/L
ALT SERPL W/O P-5'-P-CCNC: 55 U/L
ANION GAP SERPL CALC-SCNC: 10 MMOL/L
AST SERPL-CCNC: 171 U/L
BILIRUB SERPL-MCNC: 2.1 MG/DL
BUN SERPL-MCNC: 36 MG/DL
CALCIUM SERPL-MCNC: 9.2 MG/DL
CHLORIDE SERPL-SCNC: 107 MMOL/L
CO2 SERPL-SCNC: 21 MMOL/L
CREAT SERPL-MCNC: 1.5 MG/DL
EST. GFR  (AFRICAN AMERICAN): 43 ML/MIN/1.73 M^2
EST. GFR  (NON AFRICAN AMERICAN): 38 ML/MIN/1.73 M^2
GLUCOSE SERPL-MCNC: 91 MG/DL
POTASSIUM SERPL-SCNC: 3.4 MMOL/L
PROT SERPL-MCNC: 6.5 G/DL
SODIUM SERPL-SCNC: 138 MMOL/L

## 2017-01-15 PROCEDURE — 25000003 PHARM REV CODE 250: Performed by: HOSPITALIST

## 2017-01-15 PROCEDURE — 21400001 HC TELEMETRY ROOM

## 2017-01-15 PROCEDURE — 25000003 PHARM REV CODE 250: Performed by: EMERGENCY MEDICINE

## 2017-01-15 PROCEDURE — 80053 COMPREHEN METABOLIC PANEL: CPT

## 2017-01-15 PROCEDURE — 25000003 PHARM REV CODE 250: Performed by: INTERNAL MEDICINE

## 2017-01-15 PROCEDURE — 36415 COLL VENOUS BLD VENIPUNCTURE: CPT

## 2017-01-15 RX ORDER — LORAZEPAM 0.5 MG/1
1 TABLET ORAL EVERY 6 HOURS PRN
Status: DISCONTINUED | OUTPATIENT
Start: 2017-01-15 | End: 2017-01-23 | Stop reason: HOSPADM

## 2017-01-15 RX ADMIN — LACTULOSE 30 G: 10 SOLUTION ORAL at 05:01

## 2017-01-15 RX ADMIN — CIPROFLOXACIN HYDROCHLORIDE 250 MG: 250 TABLET, FILM COATED ORAL at 09:01

## 2017-01-15 RX ADMIN — CIPROFLOXACIN HYDROCHLORIDE 250 MG: 250 TABLET, FILM COATED ORAL at 08:01

## 2017-01-15 RX ADMIN — Medication 3 ML: at 02:01

## 2017-01-15 RX ADMIN — Medication 3 ML: at 09:01

## 2017-01-15 RX ADMIN — FOLIC ACID 1 MG: 1 TABLET ORAL at 08:01

## 2017-01-15 RX ADMIN — LORAZEPAM 1 MG: 0.5 TABLET ORAL at 09:01

## 2017-01-15 RX ADMIN — LORAZEPAM 1 MG: 0.5 TABLET ORAL at 07:01

## 2017-01-15 RX ADMIN — RIFAXIMIN 550 MG: 550 TABLET ORAL at 09:01

## 2017-01-15 RX ADMIN — ESCITALOPRAM OXALATE 10 MG: 10 TABLET ORAL at 08:01

## 2017-01-15 RX ADMIN — Medication 3 ML: at 05:01

## 2017-01-15 RX ADMIN — RIFAXIMIN 550 MG: 550 TABLET ORAL at 08:01

## 2017-01-15 NOTE — PROGRESS NOTES
[facility] Hospital Medicine  Progress Note    Patient Name: Tana Brewster  MRN: [id number]  Patient Class: IP- Inpatient   Admission Date: 1/8/2017  Length of Stay: 7 days  Attending Physician: Augie Jackson MD  Primary Care Provider: Pablo Browne MD        Subjective:     Principal Problem:Generalized weakness    HPI:  Tana Brewster is a 61 y.o. female that  has a past medical history of Crohn disease and Hypertension. Presents to [facility] complaining of generalized weakness.  She has a long-standing history of chronic alcoholism.  She states she has had subacute onset of generalized weakness that is progressively worsened over the last week.  Her symptoms are constant.  She thinks they are possibly exacerbated by decreased appetite and oral intake.  Associated symptoms include ataxia and bilateral lower extremity weakness.  Associated symptoms also include intermittent dysuria and she feels like she has a urinary tract infection.  She denies abdominal pain but does have some suprapubic discomfort.  She had a normal bowel movement this morning and denies melena, hematochezia, hematemesis, nausea, vomiting, chest pain, shortness of breath, syncope, vertigo, or tremors, fever, chills, or skin lesions or infections.    In the emergency department routine laboratory studies were obtained which demonstrated multiple abnormalities including evidence of urinary tract infection and electrolyte abnormalities.      Hospital medicine has been asked to admit for further evaluation and treatment.         Hospital Course:  62 y/o with cirrhosis due to ETOH abuse who was admitted for encephalopathy, UTI and electrolyte abnormalities. Pt was treated with Rocephin for UTI and had ascites evaluated/treated with paracentesis, and mentation cleared. Pt had persistent hypokalemia now improved. Pt with marked physical debility and deconditioning requiring  therapy.    Interval History:  No new issues.     Review of Systems   Constitutional: Negative for activity change.   HENT: Negative for congestion.    Respiratory: Negative for chest tightness and shortness of breath.    Cardiovascular: Negative for chest pain.   Gastrointestinal: Positive for abdominal distention.     Objective:     Vital Signs (Most Recent):  Temp: 98.2 °F (36.8 °C) (01/15/17 0500)  Pulse: 81 (01/15/17 0500)  Resp: 16 (01/15/17 0500)  BP: (!) 107/53 (01/15/17 0500)  SpO2: 97 % (01/15/17 0500) Vital Signs (24h Range):  Temp:  [97.9 °F (36.6 °C)-98.4 °F (36.9 °C)] 98.2 °F (36.8 °C)  Pulse:  [81-91] 81  Resp:  [16-18] 16  SpO2:  [95 %-100 %] 97 %  BP: ()/(46-55) 107/53     Weight: 77.1 kg (170 lb)  Body mass index is 31.09 kg/(m^2).    Intake/Output Summary (Last 24 hours) at 01/15/17 0653  Last data filed at 01/15/17 0000   Gross per 24 hour   Intake               60 ml   Output                0 ml   Net               60 ml      Physical Exam   Constitutional: She appears well-developed and well-nourished.   Eyes: Pupils are equal, round, and reactive to light.   Cardiovascular: Normal rate.    Pulmonary/Chest: Effort normal.   Abdominal: Soft.   Neurological: She is alert.       Significant Labs:   Significant Imaging:     Assessment/Plan:      * Generalized weakness  Multifactorial due to ARF, UTI, chronic alcoholism, malnutrition with resultant deconditioning, debility, functional impaired mobility and balance. Treated underlying conditions with steady improvement, and continue therapy with PT/OT. Pt making good progress and is motivated, excellent rehab candidate.      Acute on chronic renal failure  Initial there was concern for possible prerenal etiology vs hepatorenal syndrome but given improving creatinine with IVF, more likely prerenal. IVF stopped, and creatinine steadily improving. It is unclear what her baseline is at this time but we are likely getting close to her baseline.  Continue current care and monitor.    Hypokalemia  Resolved. Replaced judiciously given ARF, monitor.      Hypomagnesemia  Repleted and monitor.      Encephalopathy, metabolic  Resolved. Multifactorial, due to infectious process, and hepatic encephalopathy; at baseline.    Ascites due to alcoholic cirrhosis  Paracentesis by IR on 1/9 with >4 L removed. Improved. Pt's abdomen is chronically distended due to chronic ascites that persists. She does not have any abdominal pain and has multiple bowel movements that is expected due to lactulose. Stable.      Acute cystitis without hematuria  Due to E. Coli. Initially treated with Rocephin, then switched to PO Cipro based on sensitivities, last dose on 1/18/17.      Chronic alcohol abuse  Pt reports she has stopped drinking for the past 2 months. Continued sobriety encouraged. No signs of withdrawal and she is out of window for withdrawal. Supplement with thiamine and folic acid. Stable.    Elevated troponin  Followed markers, not consistent to ACS.  No further workup needed.      Depression  Started on Lexapro 10 daily.       VTE Risk Mitigation         Ordered     Medium Risk of VTE  Once      01/08/17 2145     Place sequential compression device  Until discontinued      01/08/17 2145     Place SARAH hose  Until discontinued      01/08/17 2145        To rehab at some point.      Augie Corbin MD  Department of Hospital Medicine   [facility]

## 2017-01-15 NOTE — SUBJECTIVE & OBJECTIVE
Interval History:  No new issues.     Review of Systems   Constitutional: Negative for activity change.   HENT: Negative for congestion.    Respiratory: Negative for chest tightness and shortness of breath.    Cardiovascular: Negative for chest pain.   Gastrointestinal: Positive for abdominal distention.     Objective:     Vital Signs (Most Recent):  Temp: 98.2 °F (36.8 °C) (01/15/17 0500)  Pulse: 81 (01/15/17 0500)  Resp: 16 (01/15/17 0500)  BP: (!) 107/53 (01/15/17 0500)  SpO2: 97 % (01/15/17 0500) Vital Signs (24h Range):  Temp:  [97.9 °F (36.6 °C)-98.4 °F (36.9 °C)] 98.2 °F (36.8 °C)  Pulse:  [81-91] 81  Resp:  [16-18] 16  SpO2:  [95 %-100 %] 97 %  BP: ()/(46-55) 107/53     Weight: 77.1 kg (170 lb)  Body mass index is 31.09 kg/(m^2).    Intake/Output Summary (Last 24 hours) at 01/15/17 0653  Last data filed at 01/15/17 0000   Gross per 24 hour   Intake               60 ml   Output                0 ml   Net               60 ml      Physical Exam   Constitutional: She appears well-developed and well-nourished.   Eyes: Pupils are equal, round, and reactive to light.   Cardiovascular: Normal rate.    Pulmonary/Chest: Effort normal.   Abdominal: Soft.   Neurological: She is alert.       Significant Labs:   Significant Imaging:

## 2017-01-15 NOTE — PLAN OF CARE
Problem: Fall Risk (Adult)  Goal: Absence of Falls  Patient will demonstrate the desired outcomes by discharge/transition of care.   Outcome: Ongoing (interventions implemented as appropriate)    01/15/17 0525   Fall Risk (Adult)   Absence of Falls making progress toward outcome         Problem: Infection, Risk/Actual (Adult)  Goal: Infection Prevention/Resolution  Patient will demonstrate the desired outcomes by discharge/transition of care.   Outcome: Ongoing (interventions implemented as appropriate)    01/15/17 0525   Infection, Risk/Actual (Adult)   Infection Prevention/Resolution making progress toward outcome         Problem: Patient Care Overview  Goal: Plan of Care Review  Outcome: Ongoing (interventions implemented as appropriate)    01/15/17 0525   Coping/Psychosocial   Plan Of Care Reviewed With patient         Problem: Pressure Ulcer Risk (Gerhard Scale) (Adult,Obstetrics,Pediatric)  Goal: Skin Integrity  Patient will demonstrate the desired outcomes by discharge/transition of care.   Outcome: Ongoing (interventions implemented as appropriate)    01/15/17 0525   Pressure Ulcer Risk (Gerhard Scale) (Adult,Obstetrics,Pediatric)   Skin Integrity making progress toward outcome         Problem: Liver Failure, Acute/Chronic (Adult)  Goal: Signs and Symptoms of Listed Potential Problems Will be Absent, Minimized or Managed (Liver Failure, Acute/Chronic)  Signs and symptoms of listed potential problems will be absent, minimized or managed by discharge/transition of care (reference Liver Failure, Acute/Chronic (Adult) CPG).   Outcome: Ongoing (interventions implemented as appropriate)    01/15/17 0525   Liver Failure, Acute/Chronic   Problems Assessed (Liver Failure, Acute/Chronic) fluid/electrolyte imbalance   Problems Present (Liver Failure, Acute/Chronic) fluid/electrolyte imbalance

## 2017-01-15 NOTE — PLAN OF CARE
Problem: Fall Risk (Adult)  Goal: Identify Related Risk Factors and Signs and Symptoms  Related risk factors and signs and symptoms are identified upon initiation of Human Response Clinical Practice Guideline (CPG)   Outcome: Ongoing (interventions implemented as appropriate)    01/09/17 1727   Fall Risk   Related Risk Factors (Fall Risk) bladder function altered;depression/anxiety;history of falls;polypharmacy;environment unfamiliar       Goal: Absence of Falls  Patient will demonstrate the desired outcomes by discharge/transition of care.   Outcome: Ongoing (interventions implemented as appropriate)    01/15/17 1109   Fall Risk (Adult)   Absence of Falls making progress toward outcome         Problem: Infection, Risk/Actual (Adult)  Goal: Identify Related Risk Factors and Signs and Symptoms  Related risk factors and signs and symptoms are identified upon initiation of Human Response Clinical Practice Guideline (CPG)   Outcome: Ongoing (interventions implemented as appropriate)    01/09/17 1727   Infection, Risk/Actual   Related Risk Factors (Infection, Risk/Actual) tissue perfusion altered;malnutrition   Signs and Symptoms (Infection, Risk/Actual) lab value changes       Goal: Infection Prevention/Resolution  Patient will demonstrate the desired outcomes by discharge/transition of care.   Outcome: Ongoing (interventions implemented as appropriate)    01/15/17 1109   Infection, Risk/Actual (Adult)   Infection Prevention/Resolution making progress toward outcome         Problem: Patient Care Overview  Goal: Plan of Care Review  Outcome: Ongoing (interventions implemented as appropriate)    01/15/17 1109   Coping/Psychosocial   Plan Of Care Reviewed With patient         Problem: Pressure Ulcer Risk (Gerhard Scale) (Adult,Obstetrics,Pediatric)  Goal: Identify Related Risk Factors and Signs and Symptoms  Related risk factors and signs and symptoms are identified upon initiation of Human Response Clinical Practice  Guideline (CPG)   Outcome: Ongoing (interventions implemented as appropriate)    01/15/17 1109   Pressure Ulcer Risk (Gerhard Scale)   Related Risk Factors (Pressure Ulcer Risk (Gerhard Scale)) hospitalization prolonged;nutritional deficiencies;substance abuse       Goal: Skin Integrity  Patient will demonstrate the desired outcomes by discharge/transition of care.   Outcome: Ongoing (interventions implemented as appropriate)    01/15/17 1109   Pressure Ulcer Risk (Gerhard Scale) (Adult,Obstetrics,Pediatric)   Skin Integrity making progress toward outcome         Problem: Liver Failure, Acute/Chronic (Adult)  Goal: Signs and Symptoms of Listed Potential Problems Will be Absent, Minimized or Managed (Liver Failure, Acute/Chronic)  Signs and symptoms of listed potential problems will be absent, minimized or managed by discharge/transition of care (reference Liver Failure, Acute/Chronic (Adult) CPG).   Outcome: Ongoing (interventions implemented as appropriate)    01/15/17 0525   Liver Failure, Acute/Chronic   Problems Assessed (Liver Failure, Acute/Chronic) fluid/electrolyte imbalance   Problems Present (Liver Failure, Acute/Chronic) fluid/electrolyte imbalance

## 2017-01-16 LAB
ALBUMIN SERPL BCP-MCNC: 1.9 G/DL
ALP SERPL-CCNC: 428 U/L
ALT SERPL W/O P-5'-P-CCNC: 52 U/L
ANION GAP SERPL CALC-SCNC: 10 MMOL/L
AST SERPL-CCNC: 169 U/L
BILIRUB SERPL-MCNC: 2.2 MG/DL
BUN SERPL-MCNC: 40 MG/DL
CALCIUM SERPL-MCNC: 8.9 MG/DL
CHLORIDE SERPL-SCNC: 107 MMOL/L
CO2 SERPL-SCNC: 23 MMOL/L
CREAT SERPL-MCNC: 1.5 MG/DL
EST. GFR  (AFRICAN AMERICAN): 43 ML/MIN/1.73 M^2
EST. GFR  (NON AFRICAN AMERICAN): 38 ML/MIN/1.73 M^2
GLUCOSE SERPL-MCNC: 83 MG/DL
MAGNESIUM SERPL-MCNC: 1.7 MG/DL
POTASSIUM SERPL-SCNC: 3.7 MMOL/L
PROT SERPL-MCNC: 6.5 G/DL
SODIUM SERPL-SCNC: 140 MMOL/L

## 2017-01-16 PROCEDURE — 97110 THERAPEUTIC EXERCISES: CPT

## 2017-01-16 PROCEDURE — 21400001 HC TELEMETRY ROOM

## 2017-01-16 PROCEDURE — 25000003 PHARM REV CODE 250: Performed by: HOSPITALIST

## 2017-01-16 PROCEDURE — 25000003 PHARM REV CODE 250: Performed by: INTERNAL MEDICINE

## 2017-01-16 PROCEDURE — 97530 THERAPEUTIC ACTIVITIES: CPT

## 2017-01-16 PROCEDURE — 97116 GAIT TRAINING THERAPY: CPT

## 2017-01-16 PROCEDURE — 36415 COLL VENOUS BLD VENIPUNCTURE: CPT

## 2017-01-16 PROCEDURE — 25000003 PHARM REV CODE 250: Performed by: EMERGENCY MEDICINE

## 2017-01-16 PROCEDURE — 83735 ASSAY OF MAGNESIUM: CPT

## 2017-01-16 PROCEDURE — 80053 COMPREHEN METABOLIC PANEL: CPT

## 2017-01-16 RX ADMIN — CIPROFLOXACIN HYDROCHLORIDE 250 MG: 250 TABLET, FILM COATED ORAL at 09:01

## 2017-01-16 RX ADMIN — RIFAXIMIN 550 MG: 550 TABLET ORAL at 09:01

## 2017-01-16 RX ADMIN — Medication 3 ML: at 09:01

## 2017-01-16 RX ADMIN — LORAZEPAM 1 MG: 0.5 TABLET ORAL at 06:01

## 2017-01-16 RX ADMIN — LACTULOSE 30 G: 10 SOLUTION ORAL at 06:01

## 2017-01-16 RX ADMIN — Medication 3 ML: at 06:01

## 2017-01-16 RX ADMIN — FOLIC ACID 1 MG: 1 TABLET ORAL at 09:01

## 2017-01-16 RX ADMIN — ESCITALOPRAM OXALATE 10 MG: 10 TABLET ORAL at 09:01

## 2017-01-16 NOTE — PT/OT/SLP PROGRESS
"Occupational Therapy  Treatment    Tanajayshree Brewster   MRN: [id number]   Admitting Diagnosis: Generalized weakness    OT Date of Treatment: 17   OT Start Time: 1345  OT Stop Time: 1414  OT Total Time (min): 29 min    Billable Minutes:  Therapeutic Activity 10 and Therapeutic Exercise 19    General Precautions: Standard, fall  Orthopedic Precautions: N/A  Braces: N/A    Do you have any cultural, spiritual, Adventism conflicts, given your current situation?: no    Subjective:  Communicated with nurseDerrick prior to session. "Oh, I already did that today"  Patient's mother and other family/friends were present.    Pain Ratin/10              Pain Rating Post-Intervention: 0/10    Objective:  Patient found with: telemetry     Functional Mobility:  Bed Mobility:  Scooting/Bridging: Stand by Assistance  Supine to Sit: Stand by Assistance    Transfers:   Sit <> Stand Assistance: Stand By Assistance  Sit <> Stand Assistive Device: Rolling Walker  Bed <> Chair Transfer Assistance: Stand By Assistance  Bed <> Chair Assistive Device: Rolling Walker    Functional Ambulation: 3-4 steps to bedside chair    Activities of Daily Living:  UE Dressing Level of Assistance: Minimum assistance    Balance:   Static Sit: GOOD-: Takes MODERATE challenges from all directions but inconsistently  Dynamic Sit: FAIR+: Maintains balance through MINIMAL excursions of active trunk motion  Static Stand: FAIR+: Takes MINIMAL challenges from all directions  Dynamic stand: FAIR+: Needs CLOSE SUPERVISION during gait and is able to right self with minor LOB    Therapeutic Activities and Exercises:  Encouragement required for patient to engage and initiate participation. Sup > sit SBA.  Donned gown as robe with Min (A). T/f to bedside chair using RW with SBA. In bedside chair, patient completed BUE AROM therex, 1 x 10 reps, all avail joints and planes, with rest breaks in between exercises. Patient instructed on proper breathing technique. BUE tremors " noted during therex.     AM-PAC 6 CLICK ADL   How much help from another person does this patient currently need?   1 = Unable, Total/Dependent Assistance  2 = A lot, Maximum/Moderate Assistance  3 = A little, Minimum/Contact Guard/Supervision  4 = None, Modified [place]/Independent    Putting on and taking off regular lower body clothing? : 2  Bathing (including washing, rinsing, drying)?: 2  Toileting, which includes using toilet, bedpan, or urinal? : 2  Putting on and taking off regular upper body clothing?: 3  Taking care of personal grooming such as brushing teeth?: 4  Eating meals?: 4  Total Score: 17     AM-PAC Raw Score CMS G-Code Modifier Level of Impairment Assistance   6 % Total / Unable   7 - 9 CM 80 - 100% Maximal Assist   10 - 14 CL 60 - 80% Moderate Assist   15 - 19 CK 40 - 60% Moderate Assist   20 - 22 CJ 20 - 40% Minimal Assist   23 CI 1-20% SBA / CGA   24 CH 0% Independent/ Mod I     Patient left up in chair with all lines intact, call button in reach, nsg notified and family/friends present    ASSESSMENT:  Tana Brewster is a 61 y.o. female with a medical diagnosis of Generalized weakness   Patient still remains with decreased strength and endurance. Requires encouragement to initiate and participate in therapy. Patient will benefit from IPR OT to address functional deficits and improve independence in self-care status.     Rehab identified problem list/impairments: Rehab identified problem list/impairments: weakness, impaired endurance, impaired self care skills, impaired functional mobilty, gait instability, impaired balance, decreased upper extremity function, decreased lower extremity function, decreased coordination, impaired cardiopulmonary response to activity, decreased safety awareness    Rehab potential is good.    Activity tolerance: Fair    Discharge recommendations: Discharge Facility/Level Of Care Needs: rehabilitation facility     Barriers to discharge: Barriers to  Discharge: Inaccessible home environment, Decreased caregiver support    Equipment recommendations: none     GOALS:   Occupational Therapy Goals        Problem: Occupational Therapy Goal    Goal Priority Disciplines Outcome Interventions   Occupational Therapy Goal     OT, PT/OT Ongoing (interventions implemented as appropriate)    Description:  Goals to be met by: 1/21/2017     Patient will increase functional independence with ADLs by performing:    Feeding with Set-up Assistance.  UE Dressing with Minimal Assistance. --MET 1/13  LE Dressing with Moderate Assistance.  Grooming while standing at sink with Set-up Assistance.  Toileting from toilet with Moderate Assistance for hygiene and clothing management.   Toilet transfer to bedside commode with Supervision.  Upper extremity exercise program x15 reps per handout, with assistance as needed.                 Plan:  Patient to be seen 5 x/week to address the above listed problems via self-care/home management, therapeutic activities, therapeutic exercises  Plan of Care expires:    Plan of Care reviewed with: patient, mother, friend         SHELTON Marx  01/16/2017

## 2017-01-16 NOTE — PLAN OF CARE
Problem: Physical Therapy Goal  Goal: Physical Therapy Goal  Goals to be met by: 17    Patient will increase functional independence with mobility by performin. Supine to sit with supervision  2. Sit to stand transfer supervision with RW  3. Gait 50ft with RW and supervision  4. Lower extremity exercise program x30 reps per handout, with supervision   Outcome: Ongoing (interventions implemented as appropriate)     Patient increased gait distance.

## 2017-01-16 NOTE — PLAN OF CARE
Problem: Fall Risk (Adult)  Goal: Identify Related Risk Factors and Signs and Symptoms  Related risk factors and signs and symptoms are identified upon initiation of Human Response Clinical Practice Guideline (CPG)   Outcome: Ongoing (interventions implemented as appropriate)    01/16/17 0605   Fall Risk   Related Risk Factors (Fall Risk) age-related changes;bladder function altered;depression/anxiety;fatigue/slow reaction;gait/mobility problems;history of falls;neuro disease/injury;polypharmacy;environment unfamiliar   Signs and Symptoms (Fall Risk) presence of risk factors         Problem: Infection, Risk/Actual (Adult)  Goal: Identify Related Risk Factors and Signs and Symptoms  Related risk factors and signs and symptoms are identified upon initiation of Human Response Clinical Practice Guideline (CPG)   Outcome: Ongoing (interventions implemented as appropriate)    01/16/17 0605   Infection, Risk/Actual   Related Risk Factors (Infection, Risk/Actual) chronic illness/condition;exposure to microbes;poor personal hygiene;substance abuse   Signs and Symptoms (Infection, Risk/Actual) malaise;weakness         Problem: Patient Care Overview  Goal: Plan of Care Review  Outcome: Ongoing (interventions implemented as appropriate)    01/16/17 0605   Coping/Psychosocial   Plan Of Care Reviewed With patient         Problem: Pressure Ulcer Risk (Gerhard Scale) (Adult,Obstetrics,Pediatric)  Goal: Identify Related Risk Factors and Signs and Symptoms  Related risk factors and signs and symptoms are identified upon initiation of Human Response Clinical Practice Guideline (CPG)   Outcome: Ongoing (interventions implemented as appropriate)    01/16/17 0605   Pressure Ulcer Risk (Gerhard Scale)   Related Risk Factors (Pressure Ulcer Risk (Gerhard Scale)) excretions/secretions;fluid intake inadequate;mechanical forces;medical devices;mobility impaired;nutritional deficiencies;substance abuse       Goal: Skin Integrity  Patient will  demonstrate the desired outcomes by discharge/transition of care.   Outcome: Ongoing (interventions implemented as appropriate)    01/16/17 0605   Pressure Ulcer Risk (Gerhard Scale) (Adult,Obstetrics,Pediatric)   Skin Integrity making progress toward outcome         Problem: Liver Failure, Acute/Chronic (Adult)  Goal: Signs and Symptoms of Listed Potential Problems Will be Absent, Minimized or Managed (Liver Failure, Acute/Chronic)  Signs and symptoms of listed potential problems will be absent, minimized or managed by discharge/transition of care (reference Liver Failure, Acute/Chronic (Adult) CPG).   Outcome: Ongoing (interventions implemented as appropriate)    01/16/17 0605   Liver Failure, Acute/Chronic   Problems Assessed (Liver Failure, Acute/Chronic) all   Problems Present (Liver Failure, Acute/Chronic) gastrointestinal complications;malnutrition   Plan of care reviewed with patient, voices understanding. Short term memory, reviewed often. Fall precautions maintained without fall this shift. Afebrile, Sats maintained >97% on 2 liters nasal cannula. Patient needs to be encouraged to turn, needs assist x1 and positioned with wedge. No skin breakdown noted. Continues with liquid diarrhea, 3 stools this shift.

## 2017-01-16 NOTE — PT/OT/SLP PROGRESS
Physical Therapy  Treatment    Tanajayshree Brewster   MRN: [id number]   Admitting Diagnosis: Generalized weakness    PT Received On: 17  PT Start Time: 1143     PT Stop Time: 1206    PT Total Time (min): 23 min       Billable Minutes:  Gait Training 13 and Therapeutic Exercise 10     Treatment Type: Treatment  PT/PTA: PT     PTA Visit Number: 0       General Precautions: Standard, fall  Orthopedic Precautions: N/A   Braces: N/A    Subjective:  Communicated with nurse Derrick prior to session.  Patient agreeable to participate in PT session.     Pain Ratin/10    Objective:   Patient found with: telemetry, pulse ox (continuous)    Functional Mobility:  Bed Mobility:   Supine to Sit: Stand by Assistance, With side rail    Transfers:  Sit <> Stand Assistance: Stand By Assistance (x1 from bed )  Sit <> Stand Assistive Device: Rolling Walker    Gait:   Gait Distance: ~46ft with several standing rest breaks due to patient with c/o of fatigue. She needed verbal cues for RW management during ambulation.   Assistance 1: Contact Guard Assistance  Gait Assistive Device: Rolling walker  Gait Pattern: 3-point gait  Gait Deviation(s): decreased maverick, increased time in double stance, decreased velocity of limb motion, decreased step length, decreased swing-to-stance ratio    Balance:   Static Sit: GOOD: Takes MODERATE challenges from all directions  Dynamic Sit: FAIR+: Maintains balance through MINIMAL excursions of active trunk motion  Static Stand: GOOD-: Takes MODERATE challenges from all directions inconsistently  Dynamic stand: FAIR: Needs CONTACT GUARD during gait     Therapeutic Activities and Exercises:  Patient participated in B LE seated therex while in bedside chair 2x10 for A/P, LAQ, and hip flexion. Patient tolerated well.      AM-PAC 6 CLICK MOBILITY  How much help from another person does this patient currently need?   1 = Unable, Total/Dependent Assistance  2 = A lot, Maximum/Moderate Assistance  3 = A little,  Minimum/Contact Guard/Supervision  4 = None, Modified [place]/Independent    Turning over in bed (including adjusting bedclothes, sheets and blankets)?: 4  Sitting down on and standing up from a chair with arms (e.g., wheelchair, bedside commode, etc.): 3  Moving from lying on back to sitting on the side of the bed?: 3  Moving to and from a bed to a chair (including a wheelchair)?: 4  Need to walk in hospital room?: 3  Climbing 3-5 steps with a railing?: 2  Total Score: 19    AM-PAC Raw Score CMS G-Code Modifier Level of Impairment Assistance   6 % Total / Unable   7 - 9 CM 80 - 100% Maximal Assist   10 - 14 CL 60 - 80% Moderate Assist   15 - 19 CK 40 - 60% Moderate Assist   20 - 22 CJ 20 - 40% Minimal Assist   23 CI 1-20% SBA / CGA   24 CH 0% Independent/ Mod I     Patient left up in chair on green air cushion with all lines intact, call button in reach and nurse Derrick notified.    Assessment:  Tana Brewster is a 61 y.o. female with a medical diagnosis of Generalized weakness and presents with decreased strength and endurance for functional mobility. She would continue to benefit from PT while in the hospital in order to address deficits listed below and get patient back to PLOF.     Rehab identified problem list/impairments: Rehab identified problem list/impairments: weakness, impaired endurance, impaired functional mobilty, gait instability, impaired balance, decreased lower extremity function, decreased coordination, impaired cardiopulmonary response to activity    Rehab potential is good.    Activity tolerance: Good    Discharge recommendations: Discharge Facility/Level Of Care Needs: rehabilitation facility     Barriers to discharge: Barriers to Discharge: Inaccessible home environment, Decreased caregiver support    Equipment recommendations: Equipment Needed After Discharge: none     GOALS:   Physical Therapy Goals        Problem: Physical Therapy Goal    Goal Priority Disciplines Outcome Goal  Variances Interventions   Physical Therapy Goal     PT/OT, PT Ongoing (interventions implemented as appropriate)     Description:  Goals to be met by: 17    Patient will increase functional independence with mobility by performin. Supine to sit with supervision  2. Sit to stand transfer supervision with RW  3. Gait 50ft with RW and supervision  4. Lower extremity exercise program x30 reps per handout, with supervision               PLAN:    Patient to be seen 6 x/week  to address the above listed problems via gait training, therapeutic activities, therapeutic exercises  Plan of Care expires: 17  Plan of Care reviewed with: patient, mother    Rashmi SOUSA Cinthya, PT, MOT  2017

## 2017-01-16 NOTE — PROGRESS NOTES
[facility]  Adult Nutrition  Consult Note    SUMMARY     Recommendations    Recommendation/Intervention:   1. Rec remove Renal restrictions from diet order  2. Continue supplements   3. RD to monitor  Goals: Patient will consume >=75% of meals and 100% of supplements  Nutrition Goal Status: new  Communication of RD Recs: other (comment) (physician's sticky note)    Continuum of Care Plan    Referral to Outpatient Services:  (D/C plannin gm sodium diet with supplements as needed)    Reason for Assessment    Reason for Assessment: RD follow-up  Diagnosis:  (UTI, general weakness)  Relevent Medical History: ETOH abuse, cirrhosis, ascities, met encephalopathy, acute on chronic renal failure, malnutrition; HTN, chrohn's disease   Interdisciplinary Rounds: did not attend     General Information Comments: Pt. reports appetite slowly improving. Drinking boost plus tid and eating 50% of meals. + ascites.     Nutrition Prescription Ordered    Current Diet Order: Renal, Cardiac      Oral Nutrition Supplement: Boost Plus Vanilla TID       Nutrition Risk Screen     Nutrition Risk Screen: unintentional loss of 10 lbs or more in the past 2 mos    Nutrition/Diet History    Patient Reported Diet/Restrictions/Preferences: general     Food Preferences: Denies cultural, [employer]alism, ethnic food preferences      Factors Affecting Nutritional Intake: decreased appetite     Labs/Tests/Procedures/Meds     Pertinent Labs Reviewed: reviewed  Pertinent Labs Comments: K 3.4  Pertinent Medications Reviewed: reviewed  Pertinent Medications Comments: phenergan, abx, folic acid, lactulose KCL    Physical Findings    Overall Physical Appearance: obese   Oral/Mouth Cavity: tooth/teeth missing  Skin: other (see comments) (abd. incision)    Anthropometrics     Height (inches): 62.01 in  Weight Method: Bed Scale  Weight (kg): 79.8 kg     Ideal Body Weight (IBW), Female: 110.05 lb     % Ideal Body Weight, Female (lb): 159.86 lb  BMI  (kg/m2): 32.17  BMI Grade: 30 - 34.9- obesity - grade I      Weight Loss: unintentional     Estimated/Assessed Needs    Weight Used For Calorie Calculations: 81.2 kg (179 lb 0.2 oz)   Height (cm): 157.5 cm     Energy Need Method: [place]-St Jeor (3785-9627 kcal)     RMR ([place]-St. Jeor Equation): 1325.38      Weight Used For Protein Calculations: 81.2 kg (179 lb 0.2 oz)  Protein Requirements:  g    Fluid Need Method: RDA Method      Malnutrition (Undernutrition) Diagnosis    % Intake of Estimated Energy Needs: 75 - 100%  % Meal Intake: 50% (supplements tid)      Nutrition Diagnosis    Nutrition Problem: Inadequate energy intake  Etiology/Related To: medical issues  Nutrition Diagnosis Signs/Symptoms As Evidenced By: 25% po intake/weight loss PTA  Nutrition Diagnosis Status: Improving    Monitor and Evaluation    Food and Nutrient Intake: energy intake  Food and Nutrient Adminstration: diet order, other (specify) (supplement order)  Anthropometric Measurements: weight, weight change  Biochemical Data, Medical Tests and Procedures: electrolyte and renal panel, glucose/endocrine profile  Nutrition-Focused Physical Findings: overall appearance    Nutrition Risk    Level of Risk:  (2x/week)    Nutrition Follow-Up    RD Follow-up?: Yes

## 2017-01-16 NOTE — PROGRESS NOTES
TAPAN contacted Loida with [facility] to determine if [facility] will take pt today. Loida informed TAPAN she submitted pt insurance information and waiting for authorization. Per Loida pt unable to transfer with authorization from  Insurance company.

## 2017-01-16 NOTE — PLAN OF CARE
Problem: Occupational Therapy Goal  Goal: Occupational Therapy Goal  Goals to be met by: 1/21/2017     Patient will increase functional independence with ADLs by performing:    Feeding with Set-up Assistance.  UE Dressing with Minimal Assistance. --MET 1/13  LE Dressing with Moderate Assistance.  Grooming while standing at sink with Set-up Assistance.  Toileting from toilet with Moderate Assistance for hygiene and clothing management.   Toilet transfer to bedside commode with Supervision.  Upper extremity exercise program x15 reps per handout, with assistance as needed.   Outcome: Ongoing (interventions implemented as appropriate)  Patient still remains with decreased strength and endurance. Requires encouragement to initiate and participate in therapy. Patient will benefit from IPR OT to address functional deficits and improve independence in self-care status.

## 2017-01-16 NOTE — SUBJECTIVE & OBJECTIVE
Interval History:  No new issues.     Review of Systems   Constitutional: Negative for activity change.   HENT: Negative for congestion.    Respiratory: Negative for chest tightness and shortness of breath.    Cardiovascular: Negative for chest pain.   Gastrointestinal: Positive for abdominal distention.     Objective:     Vital Signs (Most Recent):  Temp: 98.1 °F (36.7 °C) (01/16/17 0807)  Pulse: 83 (01/16/17 0807)  Resp: 19 (01/16/17 0807)  BP: 137/63 (01/16/17 0807)  SpO2: 96 % (01/16/17 0807) Vital Signs (24h Range):  Temp:  [98.1 °F (36.7 °C)-98.7 °F (37.1 °C)] 98.1 °F (36.7 °C)  Pulse:  [83-88] 83  Resp:  [16-20] 19  SpO2:  [96 %-100 %] 96 %  BP: (110-138)/(56-64) 137/63     Weight: 79.8 kg (176 lb)  Body mass index is 32.19 kg/(m^2).  No intake or output data in the 24 hours ending 01/16/17 0848   Physical Exam   Constitutional: She appears well-developed and well-nourished.   Eyes: Pupils are equal, round, and reactive to light.   Cardiovascular: Normal rate.    Pulmonary/Chest: Effort normal.   Abdominal: Soft.   Neurological: She is alert.       Significant Labs:   BMP:   Recent Labs  Lab 01/16/17  0512   GLU 83      K 3.7      CO2 23   BUN 40*   CREATININE 1.5*   CALCIUM 8.9   MG 1.7     CBC: No results for input(s): WBC, HGB, HCT, PLT in the last 48 hours.    Significant Imaging:

## 2017-01-17 PROBLEM — E83.42 HYPOMAGNESEMIA: Status: RESOLVED | Noted: 2017-01-08 | Resolved: 2017-01-17

## 2017-01-17 PROBLEM — R79.89 ELEVATED TROPONIN: Status: RESOLVED | Noted: 2017-01-08 | Resolved: 2017-01-17

## 2017-01-17 PROBLEM — N18.9 ACUTE ON CHRONIC RENAL FAILURE: Status: RESOLVED | Noted: 2017-01-08 | Resolved: 2017-01-17

## 2017-01-17 PROBLEM — N17.9 ACUTE ON CHRONIC RENAL FAILURE: Status: RESOLVED | Noted: 2017-01-08 | Resolved: 2017-01-17

## 2017-01-17 PROBLEM — G93.41 ENCEPHALOPATHY, METABOLIC: Status: RESOLVED | Noted: 2017-01-08 | Resolved: 2017-01-17

## 2017-01-17 PROBLEM — N30.00 ACUTE CYSTITIS WITHOUT HEMATURIA: Status: RESOLVED | Noted: 2017-01-08 | Resolved: 2017-01-17

## 2017-01-17 PROBLEM — E87.6 HYPOKALEMIA: Status: RESOLVED | Noted: 2017-01-08 | Resolved: 2017-01-17

## 2017-01-17 LAB
ALBUMIN SERPL BCP-MCNC: 1.9 G/DL
ALP SERPL-CCNC: 421 U/L
ALT SERPL W/O P-5'-P-CCNC: 54 U/L
ANION GAP SERPL CALC-SCNC: 9 MMOL/L
AST SERPL-CCNC: 177 U/L
BILIRUB SERPL-MCNC: 2.1 MG/DL
BUN SERPL-MCNC: 44 MG/DL
CALCIUM SERPL-MCNC: 8.9 MG/DL
CHLORIDE SERPL-SCNC: 106 MMOL/L
CO2 SERPL-SCNC: 23 MMOL/L
CREAT SERPL-MCNC: 1.5 MG/DL
EST. GFR  (AFRICAN AMERICAN): 43 ML/MIN/1.73 M^2
EST. GFR  (NON AFRICAN AMERICAN): 38 ML/MIN/1.73 M^2
GLUCOSE SERPL-MCNC: 82 MG/DL
MAGNESIUM SERPL-MCNC: 1.6 MG/DL
POTASSIUM SERPL-SCNC: 3.5 MMOL/L
PROT SERPL-MCNC: 6.4 G/DL
SODIUM SERPL-SCNC: 138 MMOL/L

## 2017-01-17 PROCEDURE — 83735 ASSAY OF MAGNESIUM: CPT

## 2017-01-17 PROCEDURE — 25000003 PHARM REV CODE 250: Performed by: HOSPITALIST

## 2017-01-17 PROCEDURE — 94761 N-INVAS EAR/PLS OXIMETRY MLT: CPT

## 2017-01-17 PROCEDURE — 21400001 HC TELEMETRY ROOM

## 2017-01-17 PROCEDURE — 80053 COMPREHEN METABOLIC PANEL: CPT

## 2017-01-17 PROCEDURE — 97535 SELF CARE MNGMENT TRAINING: CPT

## 2017-01-17 PROCEDURE — 97110 THERAPEUTIC EXERCISES: CPT

## 2017-01-17 PROCEDURE — 25000003 PHARM REV CODE 250: Performed by: EMERGENCY MEDICINE

## 2017-01-17 PROCEDURE — 97116 GAIT TRAINING THERAPY: CPT

## 2017-01-17 PROCEDURE — 25000003 PHARM REV CODE 250: Performed by: INTERNAL MEDICINE

## 2017-01-17 PROCEDURE — 36415 COLL VENOUS BLD VENIPUNCTURE: CPT

## 2017-01-17 RX ORDER — FOLIC ACID 1 MG/1
1 TABLET ORAL DAILY
Refills: 0
Start: 2017-01-17 | End: 2018-01-17

## 2017-01-17 RX ORDER — LACTULOSE 10 G/15ML
30 SOLUTION ORAL 3 TIMES DAILY
Qty: 1350 ML | Refills: 0
Start: 2017-01-17 | End: 2017-01-27

## 2017-01-17 RX ADMIN — LACTULOSE 30 G: 10 SOLUTION ORAL at 05:01

## 2017-01-17 RX ADMIN — ESCITALOPRAM OXALATE 10 MG: 10 TABLET ORAL at 08:01

## 2017-01-17 RX ADMIN — RIFAXIMIN 550 MG: 550 TABLET ORAL at 08:01

## 2017-01-17 RX ADMIN — LORAZEPAM 1 MG: 0.5 TABLET ORAL at 06:01

## 2017-01-17 RX ADMIN — LORAZEPAM 1 MG: 0.5 TABLET ORAL at 10:01

## 2017-01-17 RX ADMIN — FOLIC ACID 1 MG: 1 TABLET ORAL at 08:01

## 2017-01-17 RX ADMIN — Medication 3 ML: at 09:01

## 2017-01-17 RX ADMIN — CIPROFLOXACIN HYDROCHLORIDE 250 MG: 250 TABLET, FILM COATED ORAL at 09:01

## 2017-01-17 RX ADMIN — CIPROFLOXACIN HYDROCHLORIDE 250 MG: 250 TABLET, FILM COATED ORAL at 08:01

## 2017-01-17 RX ADMIN — RIFAXIMIN 550 MG: 550 TABLET ORAL at 09:01

## 2017-01-17 RX ADMIN — Medication 3 ML: at 05:01

## 2017-01-17 NOTE — PLAN OF CARE
Problem: Physical Therapy Goal  Goal: Physical Therapy Goal  Goals to be met by: 17    Patient will increase functional independence with mobility by performin. Supine to sit with supervision  2. Sit to stand transfer supervision with RW  3. Gait 50ft with RW and supervision  4. Lower extremity exercise program x30 reps per handout, with supervision   Outcome: Ongoing (interventions implemented as appropriate)     Patient progressing towards goals.

## 2017-01-17 NOTE — PLAN OF CARE
Problem: Fall Risk (Adult)  Goal: Absence of Falls  Patient will demonstrate the desired outcomes by discharge/transition of care.   Outcome: Ongoing (interventions implemented as appropriate)    01/17/17 0510   Fall Risk (Adult)   Absence of Falls making progress toward outcome         Problem: Patient Care Overview  Goal: Plan of Care Review  Outcome: Ongoing (interventions implemented as appropriate)    01/17/17 0510   Coping/Psychosocial   Plan Of Care Reviewed With patient         Problem: Pressure Ulcer Risk (Gerhard Scale) (Adult,Obstetrics,Pediatric)  Goal: Skin Integrity  Patient will demonstrate the desired outcomes by discharge/transition of care.   Outcome: Ongoing (interventions implemented as appropriate)    01/17/17 0510   Pressure Ulcer Risk (Gerhard Scale) (Adult,Obstetrics,Pediatric)   Skin Integrity making progress toward outcome         Problem: Liver Failure, Acute/Chronic (Adult)  Goal: Signs and Symptoms of Listed Potential Problems Will be Absent, Minimized or Managed (Liver Failure, Acute/Chronic)  Signs and symptoms of listed potential problems will be absent, minimized or managed by discharge/transition of care (reference Liver Failure, Acute/Chronic (Adult) CPG).   Outcome: Ongoing (interventions implemented as appropriate)    01/17/17 0510   Liver Failure, Acute/Chronic   Problems Assessed (Liver Failure, Acute/Chronic) fluid/electrolyte imbalance;renal dysfunction   Problems Present (Liver Failure, Acute/Chronic) fluid/electrolyte imbalance;renal dysfunction

## 2017-01-17 NOTE — PLAN OF CARE
Problem: Fall Risk (Adult)  Intervention: Monitor/Assist with Self Care    01/17/17 1524   Activity   Activity Assistance Provided assistance, 1 person       Intervention: Reduce Risk/Promote Restraint Free Environment    01/17/17 1524   Safety Interventions   Environmental Safety Modification clutter free environment maintained;lighting adjusted;room organization consistent;room near unit station       Intervention: Review Medications/Identify Contributors to Fall Risk    01/17/17 1524   Safety Interventions   Medication Review/Management medications reviewed       Intervention: Patient Rounds    01/17/17 1524   Safety Interventions   Patient Rounds bed in low position;call light in reach;bed wheels locked;clutter free environment maintained;toileting offered;visualized patient;placement of personal items at bedside;ID band on       Intervention: Safety Promotion/Fall Prevention    01/17/17 1524   Safety Interventions   Safety Promotion/Fall Prevention medications reviewed;side rails raised x 2         Goal: Identify Related Risk Factors and Signs and Symptoms  Related risk factors and signs and symptoms are identified upon initiation of Human Response Clinical Practice Guideline (CPG)   Outcome: Ongoing (interventions implemented as appropriate)    01/17/17 1524   Fall Risk   Related Risk Factors (Fall Risk) gait/mobility problems;fatigue/slow reaction       Goal: Absence of Falls  Patient will demonstrate the desired outcomes by discharge/transition of care.   Outcome: Ongoing (interventions implemented as appropriate)    01/17/17 1524   Fall Risk (Adult)   Absence of Falls making progress toward outcome         Problem: Infection, Risk/Actual (Adult)  Goal: Identify Related Risk Factors and Signs and Symptoms  Related risk factors and signs and symptoms are identified upon initiation of Human Response Clinical Practice Guideline (CPG)   Outcome: Ongoing (interventions implemented as appropriate)    01/17/17 1524    Infection, Risk/Actual   Related Risk Factors (Infection, Risk/Actual) chronic illness/condition         Problem: Patient Care Overview  Goal: Plan of Care Review  Outcome: Ongoing (interventions implemented as appropriate)    01/17/17 1524   Coping/Psychosocial   Plan Of Care Reviewed With patient       Goal: Interdisciplinary Rounds/Family Conf  Outcome: Ongoing (interventions implemented as appropriate)    01/17/17 1524   Interdisciplinary Rounds/Family Conf   Participants ;physical therapy;physician

## 2017-01-17 NOTE — DISCHARGE SUMMARY
[facility] Hospital Medicine  Discharge Summary      Patient Name: Tana Brewster  MRN: [id number]  Admission Date: 1/8/2017  Hospital Length of Stay: 9 days  Discharge Date and Time:   1/19/17  Attending Physician: Augie Jackson MD   Discharging Provider: Augie Jackson MD  Primary Care Provider: Pablo Browne MD      HPI:   Tana Brewster is a 61 y.o. female that  has a past medical history of Crohn disease and Hypertension. Presents to [facility] Emergency Department complaining of generalized weakness.  She has a long-standing history of chronic alcoholism.  She states she has had subacute onset of generalized weakness that is progressively worsened over the last week.  Her symptoms are constant.  She thinks they are possibly exacerbated by decreased appetite and oral intake.  Associated symptoms include ataxia and bilateral lower extremity weakness.  Associated symptoms also include intermittent dysuria and she feels like she has a urinary tract infection.  She denies abdominal pain but does have some suprapubic discomfort.  She had a normal bowel movement this morning and denies melena, hematochezia, hematemesis, nausea, vomiting, chest pain, shortness of breath, syncope, vertigo, or tremors, fever, chills, or skin lesions or infections.    In the emergency department routine laboratory studies were obtained which demonstrated multiple abnormalities including evidence of urinary tract infection and electrolyte abnormalities.      Hospital medicine has been asked to admit for further evaluation and treatment.         * No surgery found *      Indwelling Lines/Drains at time of discharge:   Lines/Drains/Airways          No matching active lines, drains, or airways        Hospital Course:   62 y/o with cirrhosis due to ETOH abuse who was admitted for encephalopathy, UTI and electrolyte abnormalities. Pt was treated with Rocephin for UTI and had ascites  evaluated/treated with paracentesis, and mentation cleared. Pt had persistent hypokalemia now improved. Pt with marked physical debility and deconditioning requiring therapy. The recommendation was for rehab. The patient will be discharged to [facility] today. The patient's E-coli UTI was treated.  The rest of the hospital course was unremarkable. Activity will be tolerated as per PT/OT at Rehab. Diet- low NA. Follow up with PCP in one week and GI with Dr. Stevens in 2 weeks.      Addendum:  Patient's insurance denied [facility]. Patient will go to SNF instead- to [facility] today     Consults:   Consults         Status Ordering Provider     Inpatient consult to Interventional Radiology  Once     Specialty:  Interventional Radiology  Provider:  Anita Stacy MD    Completed DUKE GROVER     IP consult to case management  Once     Provider:  (Not yet assigned)    Acknowledged CHE GAGE     IP consult to dietary  Once     Provider:  (Not yet assigned)    Completed CHE GAGE          Significant Diagnostic Studies: Labs:     Pending Diagnostic Studies:     Procedure Component Value Units Date/Time    Freeze and Hold, [facility] [place] [[id number]] Collected:  01/09/17 0950    Order Status:  Sent Lab Status:  No result     Specimen:  Body Fluid from Ascites         Final Active Diagnoses:    Diagnosis Date Noted POA    PRINCIPAL PROBLEM:  Generalized weakness [R53.1] 01/08/2017 Yes    Depression [F32.9] 01/15/2017 Yes    Ascites due to alcoholic cirrhosis [K70.31] 01/08/2017 Yes    Chronic alcohol abuse [F10.10] 01/08/2017 Yes      Problems Resolved During this Admission:    Diagnosis Date Noted Date Resolved POA    Acute on chronic renal failure [N17.9, N18.9] 01/08/2017 01/17/2017 Yes    Hypokalemia [E87.6] 01/08/2017 01/17/2017 Yes    Hypomagnesemia [E83.42] 01/08/2017 01/17/2017 Yes    Encephalopathy, metabolic [G93.41] 01/08/2017 01/17/2017 Yes    Acute cystitis without  hematuria [N30.00] 01/08/2017 01/17/2017 Yes    Elevated troponin [R79.89] 01/08/2017 01/17/2017 Yes      No new Assessment & Plan notes have been filed under this hospital service since the last note was generated.  Service: Hospital Medicine      Discharged Condition: good    Disposition:  SNF    Follow Up:  Follow-up Information     Follow up with Pablo Browne MD.    Specialty:  Internal Medicine    Contact information:    [address]  [address]  [place] [address]  [phone]          Follow up with Pablo Browne MD In 1 week.    Specialty:  Internal Medicine    Contact information:    [address]  [address]  [place] [address]  [phone]          Follow up with Clifford Stevens MD In 2 weeks.    Specialty:  Gastroenterology    Contact information:    [address] [place] [address]  [id number]  [facility]  [place] [address] [phone]          Patient Instructions:   No discharge procedures on file.  Medications:  Reconciled Home Medications:   Current Discharge Medication List      START taking these medications    Details   !! folic acid (FOLVITE) 1 MG tablet Take 1 tablet (1 mg total) by mouth once daily.  Refills: 0      lactulose (CHRONULAC) 20 gram/30 mL Soln Take 45 mLs (30 g total) by mouth 3 (three) times daily.  Qty: 1350 mL, Refills: 0      rifAXIMin (XIFAXAN) 550 mg Tab Take 1 tablet (550 mg total) by mouth 2 (two) times daily.       !! - Potential duplicate medications found. Please discuss with provider.      CONTINUE these medications which have NOT CHANGED    Details   atorvastatin (LIPITOR) 40 MG tablet Take 40 mg by mouth once daily.      esomeprazole (NEXIUM) 40 MG capsule Take 40 mg by mouth before breakfast.      !! folic acid (FOLVITE) 1 MG tablet Take 1 mg by mouth once daily.      hydrochlorothiazide (HYDRODIURIL) 25 MG tablet Take 25 mg by mouth once daily.      hydrOXYzine pamoate (VISTARIL) 25 MG Cap Take 25 mg by mouth 4 (four) times  daily.      sertraline (ZOLOFT) 100 MG tablet Take 100 mg by mouth once daily.      trazodone (DESYREL) 50 MG tablet Take 50 mg by mouth every evening.       !! - Potential duplicate medications found. Please discuss with provider.        Time spent on the discharge of patient > 30 minutes    Augie Corbin MD  Department of Hospital Medicine  [facility]

## 2017-01-17 NOTE — PT/OT/SLP PROGRESS
Physical Therapy  Treatment    Tanajayshree Brewster   MRN: [id number]   Admitting Diagnosis: Generalized weakness    PT Received On: 17  PT Start Time: 1048     PT Stop Time: 1111    PT Total Time (min): 23 min       Billable Minutes:  Gait Training 13 and Therapeutic Exercise 10    Treatment Type: Treatment  PT/PTA: PT     PTA Visit Number: 0       General Precautions: Standard, fall  Orthopedic Precautions: N/A   Braces: N/A    Subjective:  Communicated with nurse Lizabeth prior to session.  Patient agreeable to participate in PT session. Patient found seated in bedside chair with mother present.     Pain Ratin/10    Objective:   Patient found with: telemetry, peripheral IV    Functional Mobility:  Transfers:  Sit <> Stand Assistance: Stand By Assistance (x1 from bedside chair )  Sit <> Stand Assistive Device: Rolling Walker    Gait:   Gait Distance: ~60ft with several standing rest breaks due to patient c/o of fatigue. Patient needed verbal cues for RW management and safety while ambulating.   Assistance 1: Contact Guard Assistance  Gait Assistive Device: Rolling walker  Gait Pattern: 3-point gait  Gait Deviation(s): decreased maverick, increased time in double stance, decreased velocity of limb motion, decreased step length, decreased swing-to-stance ratio    Balance:   Static Sit: NORMAL: No deviations seen in posture held statically  Dynamic Sit: GOOD+: Maintains balance through MAXIMAL excursions of active trunk motion  Static Stand: GOOD-: Takes MODERATE challenges from all directions inconsistently  Dynamic stand: FAIR: Needs CONTACT GUARD during gait     Therapeutic Activities and Exercises:  Patient performed B LE seated therex 2x10 while seated in bedside chair for A/P, LAQ, hip flex, and hip abd/add.      AM-PAC 6 CLICK MOBILITY  How much help from another person does this patient currently need?   1 = Unable, Total/Dependent Assistance  2 = A lot, Maximum/Moderate Assistance  3 = A little, Minimum/Contact  Guard/Supervision  4 = None, Modified [place]/Independent    Turning over in bed (including adjusting bedclothes, sheets and blankets)?: 4  Sitting down on and standing up from a chair with arms (e.g., wheelchair, bedside commode, etc.): 3  Moving from lying on back to sitting on the side of the bed?: 3  Moving to and from a bed to a chair (including a wheelchair)?: 4  Need to walk in hospital room?: 3  Climbing 3-5 steps with a railing?: 2  Total Score: 19    AM-PAC Raw Score CMS G-Code Modifier Level of Impairment Assistance   6 % Total / Unable   7 - 9 CM 80 - 100% Maximal Assist   10 - 14 CL 60 - 80% Moderate Assist   15 - 19 CK 40 - 60% Moderate Assist   20 - 22 CJ 20 - 40% Minimal Assist   23 CI 1-20% SBA / CGA   24 CH 0% Independent/ Mod I     Patient left up in chair with all lines intact, call button in reach, nurse notified and mother present.    Assessment:  Tana Brewster is a 61 y.o. female with a medical diagnosis of Generalized weakness and presents with decreased strength and endurance for functional mobility. She would continue to benefit from PT while in the hospital in order to address deficits listed below and get patient back to PLOF.     Rehab identified problem list/impairments: Rehab identified problem list/impairments: weakness, impaired endurance, impaired functional mobilty, gait instability, impaired balance, decreased lower extremity function, decreased safety awareness, impaired cardiopulmonary response to activity    Rehab potential is good.    Activity tolerance: Fair    Discharge recommendations: Discharge Facility/Level Of Care Needs: rehabilitation facility     Barriers to discharge: Barriers to Discharge: Inaccessible home environment, Decreased caregiver support    Equipment recommendations: Equipment Needed After Discharge: none     GOALS:   Physical Therapy Goals        Problem: Physical Therapy Goal    Goal Priority Disciplines Outcome Goal Variances Interventions    Physical Therapy Goal     PT/OT, PT Ongoing (interventions implemented as appropriate)     Description:  Goals to be met by: 17    Patient will increase functional independence with mobility by performin. Supine to sit with supervision  2. Sit to stand transfer supervision with RW  3. Gait 50ft with RW and supervision  4. Lower extremity exercise program x30 reps per handout, with supervision               PLAN:    Patient to be seen 6 x/week (saturday or  )  to address the above listed problems via gait training, therapeutic activities, therapeutic exercises  Plan of Care expires: 17  Plan of Care reviewed with: patient, mother    Rashmi SOUSA Cinthya, PT, MOT  2017

## 2017-01-17 NOTE — ASSESSMENT & PLAN NOTE
Pt reports she has stopped drinking for the past 2 months. Continued sobriety encouraged. No signs of withdrawal and she is out of window for withdrawal. Supplement with thiamine and folic acid. Stable. Noted Elevated LFT's.

## 2017-01-17 NOTE — SUBJECTIVE & OBJECTIVE
Interval History:  No new issues.     Review of Systems   Constitutional: Negative for activity change.   HENT: Negative for congestion.    Respiratory: Negative for chest tightness and shortness of breath.    Cardiovascular: Negative for chest pain.   Gastrointestinal: Positive for abdominal distention.     Objective:     Vital Signs (Most Recent):  Temp: 97.9 °F (36.6 °C) (01/17/17 0822)  Pulse: 85 (01/17/17 0822)  Resp: 17 (01/17/17 0822)  BP: (!) 107/57 (01/17/17 0822)  SpO2: 95 % (01/17/17 0822) Vital Signs (24h Range):  Temp:  [97.9 °F (36.6 °C)-98.7 °F (37.1 °C)] 97.9 °F (36.6 °C)  Pulse:  [81-95] 85  Resp:  [17-19] 17  SpO2:  [95 %-98 %] 95 %  BP: (107-127)/(53-66) 107/57     Weight: 80.8 kg (178 lb 1.6 oz)  Body mass index is 32.57 kg/(m^2).    Intake/Output Summary (Last 24 hours) at 01/17/17 1138  Last data filed at 01/16/17 1800   Gross per 24 hour   Intake              360 ml   Output                0 ml   Net              360 ml      Physical Exam   Constitutional: She appears well-developed and well-nourished.   Eyes: Pupils are equal, round, and reactive to light.   Cardiovascular: Normal rate.    Pulmonary/Chest: Effort normal.   Abdominal: Soft.   Neurological: She is alert.       Significant Labs:   BMP:   Recent Labs  Lab 01/17/17  0501   GLU 82      K 3.5      CO2 23   BUN 44*   CREATININE 1.5*   CALCIUM 8.9   MG 1.6     CBC: No results for input(s): WBC, HGB, HCT, PLT in the last 48 hours.    Significant Imaging:

## 2017-01-17 NOTE — PT/OT/SLP PROGRESS
Occupational Therapy  Treatment    Tanajayshree Brewster   MRN: [id number]   Admitting Diagnosis: Generalized weakness    OT Date of Treatment: 17   OT Start Time: 951  OT Stop Time: 101  OT Total Time (min): 24 min    Billable Minutes:  Self Care/Home Management 24    General Precautions: Standard, fall  Orthopedic Precautions: N/A  Braces: N/A    Do you have any cultural, spiritual, Sabianism conflicts, given your current situation?: no    Subjective:  Communicated with nurse Lizabeth, prior to session. Patient asleep on arrival - easily arousable and agreeable to therapy.    Pain Ratin/10              Pain Rating Post-Intervention: 0/10    Objective:  Patient found with: telemetry     Functional Mobility:  Bed Mobility:  Scooting/Bridging: Stand by Assistance  Supine to Sit: Contact Guard Assistance, WIth side rail    Transfers:   Sit <> Stand Assistance: Stand By Assistance  Sit <> Stand Assistive Device: Rolling Walker  Bed <> Chair Technique: Stand Pivot  Bed <> Chair Transfer Assistance: Stand By Assistance  Bed <> Chair Assistive Device: Rolling Walker    Functional Ambulation: N/A    Activities of Daily Living:  Grooming Position: Seated, bedside chair  Grooming Level of Assistance: Supervision    Balance:   Static Sit: FAIR+: Able to take MINIMAL challenges from all directions  Dynamic Sit: FAIR+: Maintains balance through MINIMAL excursions of active trunk motion  Static Stand: FAIR: Maintains without assist but unable to take challenges  Dynamic stand: FAIR+: Needs CLOSE SUPERVISION during gait and is able to right self with minor LOB    Therapeutic Activities and Exercises:  Sup > sit with CGA and VCs to use side rail - patient with c/o dizziness after sitting upright. Passed quickly. Patient SBA t/f to bedside chair - VCs for technique, though patient with quick descent into chair. Completed G/H tasks with s/u while seated in chair - oral care, facial hygiene, hair grooming and donning deodorant.  Patient's mother educated on allowing daughter to attempt/complete items - self-feeding (allow patient to cut food items herself). Mother verbalized understanding.    AM-PAC 6 CLICK ADL   How much help from another person does this patient currently need?   1 = Unable, Total/Dependent Assistance  2 = A lot, Maximum/Moderate Assistance  3 = A little, Minimum/Contact Guard/Supervision  4 = None, Modified [place]/Independent    Putting on and taking off regular lower body clothing? : 2  Bathing (including washing, rinsing, drying)?: 2  Toileting, which includes using toilet, bedpan, or urinal? : 2  Putting on and taking off regular upper body clothing?: 3  Taking care of personal grooming such as brushing teeth?: 4  Eating meals?: 4  Total Score: 17     AM-PAC Raw Score CMS G-Code Modifier Level of Impairment Assistance   6 % Total / Unable   7 - 9 CM 80 - 100% Maximal Assist   10 - 14 CL 60 - 80% Moderate Assist   15 - 19 CK 40 - 60% Moderate Assist   20 - 22 CJ 20 - 40% Minimal Assist   23 CI 1-20% SBA / CGA   24 CH 0% Independent/ Mod I     Patient left up in chair with all lines intact, call button in reach and nsg notified    ASSESSMENT:  Tana Brewster is a 61 y.o. female with a medical diagnosis of Generalized weakness   Patient more amenable today - minimal verbal encouragement required. G/H s/u seated at bedside chair. Some c/o dizziness after sup > sit. Patient remains appropriate for IPR to address functional deficits and return to PLOF.     Rehab identified problem list/impairments: Rehab identified problem list/impairments: weakness, impaired endurance, impaired self care skills, impaired functional mobilty, gait instability, impaired balance, impaired cognition, impaired cardiopulmonary response to activity, decreased safety awareness, decreased upper extremity function, decreased lower extremity function    Rehab potential is good.    Activity tolerance: Good    Discharge recommendations:  Discharge Facility/Level Of Care Needs: rehabilitation facility     Barriers to discharge: Barriers to Discharge: Inaccessible home environment, Decreased caregiver support    Equipment recommendations: none     GOALS:   Occupational Therapy Goals        Problem: Occupational Therapy Goal    Goal Priority Disciplines Outcome Interventions   Occupational Therapy Goal     OT, PT/OT Ongoing (interventions implemented as appropriate)    Description:  Goals to be met by: 1/21/2017     Patient will increase functional independence with ADLs by performing:    Feeding with Set-up Assistance.  UE Dressing with Minimal Assistance. --MET 1/13  LE Dressing with Moderate Assistance.  Grooming while standing at sink with Set-up Assistance.  Toileting from toilet with Moderate Assistance for hygiene and clothing management.   Toilet transfer to bedside commode with Supervision.  Upper extremity exercise program x15 reps per handout, with assistance as needed.                 Plan:  Patient to be seen 5 x/week to address the above listed problems via self-care/home management, therapeutic activities, therapeutic exercises  Plan of Care expires:    Plan of Care reviewed with: patient, parent         SHELTON Marx  01/17/2017

## 2017-01-17 NOTE — PROGRESS NOTES
[facility] Hospital Medicine  Progress Note    Patient Name: Tana Brewster  MRN: [id number]  Patient Class: IP- Inpatient   Admission Date: 1/8/2017  Length of Stay: 9 days  Attending Physician: Augie Jackson MD  Primary Care Provider: Pablo Browne MD        Subjective:     Principal Problem:Generalized weakness    HPI:  Tana Brewster is a 61 y.o. female that  has a past medical history of Crohn disease and Hypertension. Presents to [facility] Emergency Department complaining of generalized weakness.  She has a long-standing history of chronic alcoholism.  She states she has had subacute onset of generalized weakness that is progressively worsened over the last week.  Her symptoms are constant.  She thinks they are possibly exacerbated by decreased appetite and oral intake.  Associated symptoms include ataxia and bilateral lower extremity weakness.  Associated symptoms also include intermittent dysuria and she feels like she has a urinary tract infection.  She denies abdominal pain but does have some suprapubic discomfort.  She had a normal bowel movement this morning and denies melena, hematochezia, hematemesis, nausea, vomiting, chest pain, shortness of breath, syncope, vertigo, or tremors, fever, chills, or skin lesions or infections.    In the emergency department routine laboratory studies were obtained which demonstrated multiple abnormalities including evidence of urinary tract infection and electrolyte abnormalities.      Hospital medicine has been asked to admit for further evaluation and treatment.         Hospital Course:  60 y/o with cirrhosis due to ETOH abuse who was admitted for encephalopathy, UTI and electrolyte abnormalities. Pt was treated with Rocephin for UTI and had ascites evaluated/treated with paracentesis, and mentation cleared. Pt had persistent hypokalemia now improved. Pt with marked physical debility and deconditioning requiring therapy. The  recommendation was for rehab. The patient will be discharged to [facility] today.     Interval History:  No new issues.     Review of Systems   Constitutional: Negative for activity change.   HENT: Negative for congestion.    Respiratory: Negative for chest tightness and shortness of breath.    Cardiovascular: Negative for chest pain.   Gastrointestinal: Positive for abdominal distention.     Objective:     Vital Signs (Most Recent):  Temp: 97.9 °F (36.6 °C) (01/17/17 0822)  Pulse: 85 (01/17/17 0822)  Resp: 17 (01/17/17 0822)  BP: (!) 107/57 (01/17/17 0822)  SpO2: 95 % (01/17/17 0822) Vital Signs (24h Range):  Temp:  [97.9 °F (36.6 °C)-98.7 °F (37.1 °C)] 97.9 °F (36.6 °C)  Pulse:  [81-95] 85  Resp:  [17-19] 17  SpO2:  [95 %-98 %] 95 %  BP: (107-127)/(53-66) 107/57     Weight: 80.8 kg (178 lb 1.6 oz)  Body mass index is 32.57 kg/(m^2).    Intake/Output Summary (Last 24 hours) at 01/17/17 1138  Last data filed at 01/16/17 1800   Gross per 24 hour   Intake              360 ml   Output                0 ml   Net              360 ml      Physical Exam   Constitutional: She appears well-developed and well-nourished.   Eyes: Pupils are equal, round, and reactive to light.   Cardiovascular: Normal rate.    Pulmonary/Chest: Effort normal.   Abdominal: Soft.   Neurological: She is alert.       Significant Labs:   BMP:   Recent Labs  Lab 01/17/17  0501   GLU 82      K 3.5      CO2 23   BUN 44*   CREATININE 1.5*   CALCIUM 8.9   MG 1.6     CBC: No results for input(s): WBC, HGB, HCT, PLT in the last 48 hours.    Significant Imaging:    Assessment/Plan:      * Generalized weakness  Multifactorial due to ARF, UTI, chronic alcoholism, malnutrition with resultant deconditioning, debility, functional impaired mobility and balance. Treated underlying conditions with steady improvement, and continue therapy with PT/OT. Pt making good progress and is motivated, excellent rehab candidate. Will go to [facility] today       Acute on chronic renal failure  Initial there was concern for possible prerenal etiology vs hepatorenal syndrome but given improving creatinine with IVF, more likely prerenal. IVF stopped, and creatinine steadily improving. It is unclear what her baseline is at this time but we are likely getting close to her baseline. Continue current care and monitor. She likely has CKD III.     Hypokalemia  Resolved.       Hypomagnesemia  Repleted and monitor.      Encephalopathy, metabolic  Resolved. Multifactorial, due to infectious process, and hepatic encephalopathy; at baseline.    Ascites due to alcoholic cirrhosis  Paracentesis by IR on 1/9 with >4 L removed. Improved. Pt's abdomen is chronically distended due to chronic ascites that persists. She does not have any abdominal pain and has multiple bowel movements that is expected due to lactulose. Stable.      Acute cystitis without hematuria  Due to E. Coli. Initially treated with Rocephin, then switched to PO Cipro based on sensitivities, last dose will be today       Chronic alcohol abuse  Pt reports she has stopped drinking for the past 2 months. Continued sobriety encouraged. No signs of withdrawal and she is out of window for withdrawal. Supplement with thiamine and folic acid. Stable. Noted Elevated LFT's.     Elevated troponin  Followed markers, not consistent to ACS.  No further workup needed.      Depression  Started on Lexapro 10 daily.       VTE Risk Mitigation         Ordered     Medium Risk of VTE  Once      01/08/17 2145     Place sequential compression device  Until discontinued      01/08/17 2145     Place SARAH hose  Until discontinued      01/08/17 2145        Will discharge to [facility] rehab today.     Addendum 2:30pm  Patient denied [facility].  Will go to SNF.    Augie Corbin MD  Department of Hospital Medicine   [facility]

## 2017-01-17 NOTE — PLAN OF CARE
Problem: Occupational Therapy Goal  Goal: Occupational Therapy Goal  Goals to be met by: 1/21/2017     Patient will increase functional independence with ADLs by performing:    Feeding with Set-up Assistance.  UE Dressing with Minimal Assistance. --MET 1/13  LE Dressing with Moderate Assistance.  Grooming while standing at sink with Set-up Assistance.  Toileting from toilet with Moderate Assistance for hygiene and clothing management.   Toilet transfer to bedside commode with Supervision.  Upper extremity exercise program x15 reps per handout, with assistance as needed.   Outcome: Ongoing (interventions implemented as appropriate)  Patient more amenable today - minimal verbal encouragement required. G/H s/u seated at bedside chair. Some c/o dizziness after sup > sit. Patient remains appropriate for IPR to address functional deficits and return to PLOF.

## 2017-01-17 NOTE — ASSESSMENT & PLAN NOTE
Initial there was concern for possible prerenal etiology vs hepatorenal syndrome but given improving creatinine with IVF, more likely prerenal. IVF stopped, and creatinine steadily improving. It is unclear what her baseline is at this time but we are likely getting close to her baseline. Continue current care and monitor. She likely has CKD III.

## 2017-01-17 NOTE — PROGRESS NOTES
Via [facility] faxed pt face sheet, MAR, labs, SNF orders, PT/OT notes, and progress notes to [facility], [facility], and [facility]. Awaiting fax confirmation.

## 2017-01-17 NOTE — PROGRESS NOTES
Call received form [facility].  Patient denied in patient rehab.  Reason:  Patient can receive needed services at SNF.  Dr Jackson notified.  Will discuss pt / family.

## 2017-01-17 NOTE — PLAN OF CARE
[facility]     Department of Hospital Medicine     [address] [place] [address]     [phone] after hours  [phone] fax           1/23/17  Admit to  SNF at [facility].   Diagnoses: Liver failure/weakness     Active Hospital Problems    Diagnosis  POA    *Generalized weakness [R53.1]  Yes    Depression [F32.9]  Yes    Ascites due to alcoholic cirrhosis [K70.31]  Yes    Chronic alcohol abuse [F10.10]  Yes      Resolved Hospital Problems    Diagnosis Date Resolved POA    Acute on chronic renal failure [N17.9, N18.9] 01/17/2017 Yes    Hypokalemia [E87.6] 01/17/2017 Yes    Hypomagnesemia [E83.42] 01/17/2017 Yes    Encephalopathy, metabolic [G93.41] 01/17/2017 Yes    Acute cystitis without hematuria [N30.00] 01/17/2017 Yes    Elevated troponin [R79.89] 01/17/2017 Yes     Very minimal and in the setting or acute renal failure.         Patient is homebound due to:  Generalized weakness    Allergies:  Review of patient's allergies indicates:   Allergen Reactions    Lisinopril Anaphylaxis       Vitals:       Routine, once monthly         Diet:   Low NA     Acitivities:    As tolerated.     LABS:  Per facility protocol    Nursing Precautions:   - Aspiration precautions:                - Fall precautions per nursing home protocol    - Decubitus precautions:        -  CONSULTS:      Physical Therapy to evaluate and treat     Occupational Therapy to evaluate and treat      Ney, Tana   Home Medication Instructions RENEE:[id number]    Printed on:01/17/17 3997   Medication Information                      atorvastatin (LIPITOR) 40 MG tablet  Take 40 mg by mouth once daily.             esomeprazole (NEXIUM) 40 MG capsule  Take 40 mg by mouth before breakfast.             folic acid (FOLVITE) 1 MG tablet  Take 1 mg by mouth once daily.             hydrochlorothiazide (HYDRODIURIL) 25 MG tablet  Take 25 mg by mouth once daily.             hydrOXYzine pamoate  (VISTARIL) 25 MG Cap  Take 25 mg by mouth 4 (four) times daily.             lactulose (CHRONULAC) 20 gram/30 mL Soln  Take 45 mLs (30 g total) by mouth 3 (three) times daily.             rifAXIMin (XIFAXAN) 550 mg Tab  Take 1 tablet (550 mg total) by mouth 2 (two) times daily.             sertraline (ZOLOFT) 100 MG tablet  Take 100 mg by mouth once daily.             trazodone (DESYREL) 50 MG tablet  Take 50 mg by mouth every evening.                       _________________________________  Augie Corbin MD  01/17/2017

## 2017-01-17 NOTE — ASSESSMENT & PLAN NOTE
Due to E. Coli. Initially treated with Rocephin, then switched to PO Cipro based on sensitivities, last dose will be today

## 2017-01-18 LAB
ALBUMIN SERPL BCP-MCNC: 1.9 G/DL
ALP SERPL-CCNC: 414 U/L
ALT SERPL W/O P-5'-P-CCNC: 53 U/L
ANION GAP SERPL CALC-SCNC: 8 MMOL/L
AST SERPL-CCNC: 159 U/L
BILIRUB SERPL-MCNC: 2.1 MG/DL
BUN SERPL-MCNC: 45 MG/DL
CALCIUM SERPL-MCNC: 8.9 MG/DL
CHLORIDE SERPL-SCNC: 106 MMOL/L
CO2 SERPL-SCNC: 22 MMOL/L
CREAT SERPL-MCNC: 1.3 MG/DL
EST. GFR  (AFRICAN AMERICAN): 52 ML/MIN/1.73 M^2
EST. GFR  (NON AFRICAN AMERICAN): 45 ML/MIN/1.73 M^2
GLUCOSE SERPL-MCNC: 93 MG/DL
MAGNESIUM SERPL-MCNC: 1.7 MG/DL
POTASSIUM SERPL-SCNC: 3.4 MMOL/L
PROT SERPL-MCNC: 6.6 G/DL
SODIUM SERPL-SCNC: 136 MMOL/L

## 2017-01-18 PROCEDURE — 97535 SELF CARE MNGMENT TRAINING: CPT

## 2017-01-18 PROCEDURE — 97110 THERAPEUTIC EXERCISES: CPT

## 2017-01-18 PROCEDURE — 25000003 PHARM REV CODE 250: Performed by: HOSPITALIST

## 2017-01-18 PROCEDURE — 83735 ASSAY OF MAGNESIUM: CPT

## 2017-01-18 PROCEDURE — 97530 THERAPEUTIC ACTIVITIES: CPT

## 2017-01-18 PROCEDURE — 21400001 HC TELEMETRY ROOM

## 2017-01-18 PROCEDURE — 25000003 PHARM REV CODE 250: Performed by: EMERGENCY MEDICINE

## 2017-01-18 PROCEDURE — 80053 COMPREHEN METABOLIC PANEL: CPT

## 2017-01-18 PROCEDURE — 36415 COLL VENOUS BLD VENIPUNCTURE: CPT

## 2017-01-18 PROCEDURE — 97116 GAIT TRAINING THERAPY: CPT

## 2017-01-18 PROCEDURE — 25000003 PHARM REV CODE 250: Performed by: INTERNAL MEDICINE

## 2017-01-18 RX ADMIN — Medication 3 ML: at 04:01

## 2017-01-18 RX ADMIN — LORAZEPAM 1 MG: 0.5 TABLET ORAL at 04:01

## 2017-01-18 RX ADMIN — CIPROFLOXACIN HYDROCHLORIDE 250 MG: 250 TABLET, FILM COATED ORAL at 08:01

## 2017-01-18 RX ADMIN — Medication 3 ML: at 10:01

## 2017-01-18 RX ADMIN — RIFAXIMIN 550 MG: 550 TABLET ORAL at 09:01

## 2017-01-18 RX ADMIN — CIPROFLOXACIN HYDROCHLORIDE 250 MG: 250 TABLET, FILM COATED ORAL at 09:01

## 2017-01-18 RX ADMIN — Medication 3 ML: at 06:01

## 2017-01-18 RX ADMIN — RIFAXIMIN 550 MG: 550 TABLET ORAL at 08:01

## 2017-01-18 RX ADMIN — ESCITALOPRAM OXALATE 10 MG: 10 TABLET ORAL at 08:01

## 2017-01-18 RX ADMIN — FOLIC ACID 1 MG: 1 TABLET ORAL at 08:01

## 2017-01-18 NOTE — PROGRESS NOTES
Pt has been denied by [facility] (per Shelly) and [facility] (per Demetria); Both representatives of each facility stated they can not meet the pt needs.     2:14PM- Pt has been accepted at [facility] per Samantha. According to Samantha she will run pt insurance and contact  later today.

## 2017-01-18 NOTE — PROGRESS NOTES
Behzad Bravo with [facility] pt is under review and TAPAN will have a decision by today on pt acceptance. TAPAN received a call from Demetria with [facility]. Behzad Augustin pt is under review. Demetria asked TAPAN to send pt face sheet; TAPAN faxed face sheet to Demetria via [facility].

## 2017-01-18 NOTE — SUBJECTIVE & OBJECTIVE
Interval History:  No new issues.     Review of Systems   Constitutional: Negative for activity change.   HENT: Negative for congestion.    Respiratory: Negative for chest tightness and shortness of breath.    Cardiovascular: Negative for chest pain.   Gastrointestinal: Positive for abdominal distention.     Objective:     Vital Signs (Most Recent):  Temp: 97.4 °F (36.3 °C) (01/18/17 0803)  Pulse: 86 (01/18/17 0803)  Resp: 18 (01/18/17 0803)  BP: (!) 126/57 (01/18/17 0803)  SpO2: 95 % (01/18/17 0803) Vital Signs (24h Range):  Temp:  [97.4 °F (36.3 °C)-99 °F (37.2 °C)] 97.4 °F (36.3 °C)  Pulse:  [85-90] 86  Resp:  [18] 18  SpO2:  [94 %-97 %] 95 %  BP: (114-138)/(55-66) 126/57     Weight: 80.8 kg (178 lb 1.6 oz)  Body mass index is 32.57 kg/(m^2).  No intake or output data in the 24 hours ending 01/18/17 1020   Physical Exam   Constitutional: She appears well-developed and well-nourished.   Eyes: Pupils are equal, round, and reactive to light.   Cardiovascular: Normal rate.    Pulmonary/Chest: Effort normal.   Abdominal: Soft.   Neurological: She is alert.       Significant Labs:   BMP:   Recent Labs  Lab 01/18/17  0513   GLU 93      K 3.4*      CO2 22*   BUN 45*   CREATININE 1.3   CALCIUM 8.9   MG 1.7     CBC: No results for input(s): WBC, HGB, HCT, PLT in the last 48 hours.    Significant Imaging

## 2017-01-18 NOTE — ASSESSMENT & PLAN NOTE
Multifactorial due to ARF, UTI, chronic alcoholism, malnutrition with resultant deconditioning, debility, functional impaired mobility and balance. Treated underlying conditions with steady improvement, and continue therapy with PT/OT. Pt making good progress and is motivated, excellent rehab candidate. Patient was to go to [facility] yesterday but insurance denied but will pay for SNF. Discussed with  and patient will likely be discharged to SNF today.

## 2017-01-18 NOTE — PLAN OF CARE
Problem: Occupational Therapy Goal  Goal: Occupational Therapy Goal  Goals to be met by: 1/21/2017     Patient will increase functional independence with ADLs by performing:    Feeding with Set-up Assistance.  UE Dressing with Minimal Assistance. --MET 1/13  LE Dressing with Moderate Assistance.  Grooming while standing at sink with Set-up Assistance.--MET 1/18/2017  Toileting from toilet with Moderate Assistance for hygiene and clothing management.   Toilet transfer to bedside commode with Supervision.  Upper extremity exercise program x15 reps per handout, with assistance as needed.--MET 1/18/2017   Outcome: Ongoing (interventions implemented as appropriate)  Patient making good progress towards goals, will benefit from skilled OT services following acute care stay. DULCE Roper, MS

## 2017-01-18 NOTE — PROGRESS NOTES
[facility] Hospital Medicine  Progress Note    Patient Name: Tana Brewster  MRN: [id number]  Patient Class: IP- Inpatient   Admission Date: 1/8/2017  Length of Stay: 10 days  Attending Physician: Augie Jackson MD  Primary Care Provider: Pablo Browne MD        Subjective:     Principal Problem:Generalized weakness    HPI:  Tana Brewster is a 61 y.o. female that  has a past medical history of Crohn disease and Hypertension. Presents to [facility] complaining of generalized weakness.  She has a long-standing history of chronic alcoholism.  She states she has had subacute onset of generalized weakness that is progressively worsened over the last week.  Her symptoms are constant.  She thinks they are possibly exacerbated by decreased appetite and oral intake.  Associated symptoms include ataxia and bilateral lower extremity weakness.  Associated symptoms also include intermittent dysuria and she feels like she has a urinary tract infection.  She denies abdominal pain but does have some suprapubic discomfort.  She had a normal bowel movement this morning and denies melena, hematochezia, hematemesis, nausea, vomiting, chest pain, shortness of breath, syncope, vertigo, or tremors, fever, chills, or skin lesions or infections.    In the emergency department routine laboratory studies were obtained which demonstrated multiple abnormalities including evidence of urinary tract infection and electrolyte abnormalities.      Hospital medicine has been asked to admit for further evaluation and treatment.         Hospital Course:  60 y/o with cirrhosis due to ETOH abuse who was admitted for encephalopathy, UTI and electrolyte abnormalities. Pt was treated with Rocephin for UTI and had ascites evaluated/treated with paracentesis, and mentation cleared. Pt had persistent hypokalemia now improved. Pt with marked physical debility and deconditioning requiring therapy. The  recommendation was for rehab. The patient will be discharged to [facility] today. The patient's E-coli UTI was treated.  The rest of the hospital course was unremarkable. Activity will be tolerated as per PT/OT at Rehab. Diet- low NA. Follow up with PCP in one week and GI with Dr. Stevens in 2 weeks.      Interval History:  No new issues.     Review of Systems   Constitutional: Negative for activity change.   HENT: Negative for congestion.    Respiratory: Negative for chest tightness and shortness of breath.    Cardiovascular: Negative for chest pain.   Gastrointestinal: Positive for abdominal distention.     Objective:     Vital Signs (Most Recent):  Temp: 97.4 °F (36.3 °C) (01/18/17 0803)  Pulse: 86 (01/18/17 0803)  Resp: 18 (01/18/17 0803)  BP: (!) 126/57 (01/18/17 0803)  SpO2: 95 % (01/18/17 0803) Vital Signs (24h Range):  Temp:  [97.4 °F (36.3 °C)-99 °F (37.2 °C)] 97.4 °F (36.3 °C)  Pulse:  [85-90] 86  Resp:  [18] 18  SpO2:  [94 %-97 %] 95 %  BP: (114-138)/(55-66) 126/57     Weight: 80.8 kg (178 lb 1.6 oz)  Body mass index is 32.57 kg/(m^2).  No intake or output data in the 24 hours ending 01/18/17 1020   Physical Exam   Constitutional: She appears well-developed and well-nourished.   Eyes: Pupils are equal, round, and reactive to light.   Cardiovascular: Normal rate.    Pulmonary/Chest: Effort normal.   Abdominal: Soft.   Neurological: She is alert.       Significant Labs:   BMP:   Recent Labs  Lab 01/18/17  0513   GLU 93      K 3.4*      CO2 22*   BUN 45*   CREATININE 1.3   CALCIUM 8.9   MG 1.7     CBC: No results for input(s): WBC, HGB, HCT, PLT in the last 48 hours.    Significant Imaging    Assessment/Plan:      * Generalized weakness  Multifactorial due to ARF, UTI, chronic alcoholism, malnutrition with resultant deconditioning, debility, functional impaired mobility and balance. Treated underlying conditions with steady improvement, and continue therapy with PT/OT. Pt making good  progress and is motivated, excellent rehab candidate. Patient was to go to [facility] yesterday but insurance denied but will pay for SNF. Discussed with  and patient will likely be discharged to SNF today.       Ascites due to alcoholic cirrhosis  Paracentesis by IR on 1/9 with >4 L removed. Improved. Pt's abdomen is chronically distended due to chronic ascites that persists. She does not have any abdominal pain and has multiple bowel movements that is expected due to lactulose. Stable.      Chronic alcohol abuse  Pt reports she has stopped drinking for the past 2 months. Continued sobriety encouraged. No signs of withdrawal and she is out of window for withdrawal. Supplement with thiamine and folic acid. Stable. Noted Elevated LFT's.     Depression  On Zoloft.     VTE Risk Mitigation         Ordered     Medium Risk of VTE  Once      01/08/17 2145     Place sequential compression device  Until discontinued      01/08/17 2145     Place SARAH hose  Until discontinued      01/08/17 2145        Will likely DC to SNF today.      Augie Corbin MD  Department of Hospital Medicine   [facility]

## 2017-01-18 NOTE — PT/OT/SLP PROGRESS
Physical Therapy  Treatment    Tanajayshree Brewster   MRN: [id number]   Admitting Diagnosis: Generalized weakness    PT Received On: 17  PT Start Time: 1135     PT Stop Time: 1152    PT Total Time (min): 17 min       Billable Minutes:  Gait Training 9  and Therapeutic Exercise 8     Treatment Type: Treatment  PT/PTA: PT     PTA Visit Number: 0       General Precautions: Standard, fall  Orthopedic Precautions: N/A   Braces: N/A    Subjective:  Communicated with nurse Henley prior to session.  Patient agreeable to participate in PT session.     Pain Ratin/10    Objective:   Patient found with: peripheral IV, telemetry    Functional Mobility:  Patient found seated in bedside chair.     Transfers:  Sit <> Stand Assistance: Minimum Assistance (x1 from bedside chair )  Sit <> Stand Assistive Device: Rolling Walker    Gait:   Gait Distance: ~75ft with several standing rest breaks due to patient c/o of fatigue. She needed verbal cues for RW management and safety while ambulating.   Assistance 1: Contact Guard Assistance  Gait Assistive Device: Rolling walker  Gait Pattern: 3-point gait  Gait Deviation(s): decreased maverick, increased time in double stance, decreased velocity of limb motion, decreased step length, decreased swing-to-stance ratio    Balance:   Static Sit: NORMAL: No deviations seen in posture held statically  Dynamic Sit: GOOD+: Maintains balance through MAXIMAL excursions of active trunk motion  Static Stand: GOOD: Takes MODERATE challenges from all directions  Dynamic stand: FAIR: Needs CONTACT GUARD during gait     Therapeutic Activities and Exercises:  Patient participated in B LE seated therex 3x10 for A/P, LAQ, hip flex, and hip abd/add. Patient tolerated well.     AM-PAC 6 CLICK MOBILITY  How much help from another person does this patient currently need?   1 = Unable, Total/Dependent Assistance  2 = A lot, Maximum/Moderate Assistance  3 = A little, Minimum/Contact Guard/Supervision  4 = None, Modified  [place]/Independent    Turning over in bed (including adjusting bedclothes, sheets and blankets)?: 4  Sitting down on and standing up from a chair with arms (e.g., wheelchair, bedside commode, etc.): 3  Moving from lying on back to sitting on the side of the bed?: 4  Moving to and from a bed to a chair (including a wheelchair)?: 4  Need to walk in hospital room?: 3  Climbing 3-5 steps with a railing?: 2  Total Score: 20    AM-PAC Raw Score CMS G-Code Modifier Level of Impairment Assistance   6 % Total / Unable   7 - 9 CM 80 - 100% Maximal Assist   10 - 14 CL 60 - 80% Moderate Assist   15 - 19 CK 40 - 60% Moderate Assist   20 - 22 CJ 20 - 40% Minimal Assist   23 CI 1-20% SBA / CGA   24 CH 0% Independent/ Mod I     Patient left up in chair with all lines intact, call button in reach and nurse Lory notified.    Assessment:  Tana Brewster is a 61 y.o. female with a medical diagnosis of Generalized weakness and presents with decreased strength and endurance for functional mobility. She would continue to benefit from PT while in the hospital in order to address deficits listed below and get patient back to PLOF.     Rehab identified problem list/impairments: Rehab identified problem list/impairments: weakness, impaired endurance, impaired functional mobilty, gait instability, impaired balance, decreased lower extremity function, decreased safety awareness, impaired cardiopulmonary response to activity    Rehab potential is good.    Activity tolerance: Fair due to decreased endurance     Discharge recommendations: Discharge Facility/Level Of Care Needs: nursing facility, skilled     Barriers to discharge: Barriers to Discharge: Decreased caregiver support, Inaccessible home environment    Equipment recommendations: Equipment Needed After Discharge: none     GOALS:   Physical Therapy Goals        Problem: Physical Therapy Goal    Goal Priority Disciplines Outcome Goal Variances Interventions   Physical Therapy  Goal     PT/OT, PT Ongoing (interventions implemented as appropriate)     Description:  Goals to be met by: 17    Patient will increase functional independence with mobility by performin. Supine to sit with supervision  2. Sit to stand transfer supervision with RW  3. Gait 50ft with RW and supervision  4. Lower extremity exercise program x30 reps per handout, with supervision               PLAN:    Patient to be seen 6 x/week (saturday or  )  to address the above listed problems via gait training, therapeutic activities, therapeutic exercises  Plan of Care expires: 17  Plan of Care reviewed with: patient    Rasmhi Mendes, PT, MOT  2017

## 2017-01-18 NOTE — PLAN OF CARE
Problem: Patient Care Overview  Goal: Plan of Care Review  Outcome: Ongoing (interventions implemented as appropriate)    01/18/17 1641   Coping/Psychosocial   Plan Of Care Reviewed With patient       Goal: Individualization & Mutuality    01/18/17 1641   Right Care Assessment   Number of comorbid conditions (as recorded on the chart) Alcohol abuse         Problem: Pressure Ulcer Risk (Gerhard Scale) (Adult,Obstetrics,Pediatric)  Intervention: Promote/Optimize Nutrition    01/18/17 1641   Nutrition Interventions   Oral Nutrition Promotion rest periods promoted       Intervention: Turn/Reposition Often    01/18/17 1641   Positioning   Body Position positioned/repositioned independently         Goal: Skin Integrity  Patient will demonstrate the desired outcomes by discharge/transition of care.     01/18/17 1641   Pressure Ulcer Risk (Gerhard Scale) (Adult,Obstetrics,Pediatric)   Skin Integrity making progress toward outcome         Problem: Liver Failure, Acute/Chronic (Adult)  Goal: Signs and Symptoms of Listed Potential Problems Will be Absent, Minimized or Managed (Liver Failure, Acute/Chronic)  Signs and symptoms of listed potential problems will be absent, minimized or managed by discharge/transition of care (reference Liver Failure, Acute/Chronic (Adult) CPG).     01/18/17 1641   Liver Failure, Acute/Chronic   Problems Assessed (Liver Failure, Acute/Chronic) all   Problems Present (Liver Failure, Acute/Chronic) fluid/electrolyte imbalance

## 2017-01-18 NOTE — PT/OT/SLP PROGRESS
"Occupational Therapy  Treatment    Tanajayshree Brewster   MRN: [id number]   Admitting Diagnosis: Generalized weakness    OT Date of Treatment: 17   OT Start Time: 110  OT Stop Time: 1125  OT Total Time (min): 20 min    Billable Minutes:  Self Care/Home Management 10 minutes and Therapeutic Activity 10 minutes    General Precautions: Standard, fall  Orthopedic Precautions: N/A  Braces: N/A    Do you have any cultural, spiritual, Christian conflicts, given your current situation?: no    Subjective:  Communicated with nurse prior to session.  "Do I have to get up?"    Pain Ratin/10    Objective:  Patient found with: telemetry, peripheral IV     Functional Mobility:  Bed Mobility:  Rolling/Turning to Left: Stand by assistance, With side rail  Supine to Sit: Stand by Assistance, WIth side rail    Transfers:   Sit <> Stand Assistance: Contact Guard Assistance  Sit <> Stand Assistive Device: Rolling Walker    Functional Ambulation: ambulated to the sink and back with a RW and CGA    Activities of Daily Living:  UE Dressing Level of Assistance: Set-up Assistance  Grooming Position: Standing at sink  Grooming Level of Assistance: Contact guard assistance    Balance:   Static Sit: GOOD: Takes MODERATE challenges from all directions  Dynamic Sit: GOOD: Maintains balance through MODERATE excursions of active trunk movement  Static Stand: FAIR+: Takes MINIMAL challenges from all directions  Dynamic stand: FAIR: Needs CONTACT GUARD during gait    Therapeutic Activities and Exercises:  Patient performed 10 reps of BUE exercises in all available planes of motion. Patient instructed to perform exercises 2 times per day. Patient required a break between sets of 5 reps for shoulder flexion.    AM-PAC 6 CLICK ADL   How much help from another person does this patient currently need?   1 = Unable, Total/Dependent Assistance  2 = A lot, Maximum/Moderate Assistance  3 = A little, Minimum/Contact Guard/Supervision  4 = None, Modified " [place]/Independent    Putting on and taking off regular lower body clothing? : 2  Bathing (including washing, rinsing, drying)?: 2  Toileting, which includes using toilet, bedpan, or urinal? : 3  Putting on and taking off regular upper body clothing?: 3  Taking care of personal grooming such as brushing teeth?: 4  Eating meals?: 4  Total Score: 18     AM-PAC Raw Score CMS G-Code Modifier Level of Impairment Assistance   6 % Total / Unable   7 - 9 CM 80 - 100% Maximal Assist   10 - 14 CL 60 - 80% Moderate Assist   15 - 19 CK 40 - 60% Moderate Assist   20 - 22 CJ 20 - 40% Minimal Assist   23 CI 1-20% SBA / CGA   24 CH 0% Independent/ Mod I     Patient left up in chair with all lines intact, call button in reach and PT notified of readiness for treatment    ASSESSMENT:  Tana Brewster is a 61 y.o. female with a medical diagnosis of Generalized weakness and presents with  independence for self-care and functional transfers..    Rehab identified problem list/impairments: Rehab identified problem list/impairments: weakness, impaired endurance, impaired self care skills, impaired functional mobilty, impaired balance, decreased safety awareness    Rehab potential is good.    Activity tolerance: Good    Discharge recommendations: Discharge Facility/Level Of Care Needs: nursing facility, skilled     Barriers to discharge: Barriers to Discharge: Inaccessible home environment, Decreased caregiver support    Equipment recommendations: none     GOALS:   Occupational Therapy Goals        Problem: Occupational Therapy Goal    Goal Priority Disciplines Outcome Interventions   Occupational Therapy Goal     OT, PT/OT Ongoing (interventions implemented as appropriate)    Description:  Goals to be met by: 2017     Patient will increase functional independence with ADLs by performing:    Feeding with Set-up Assistance.  UE Dressing with Minimal Assistance. --MET   LE Dressing with Moderate Assistance.  Grooming  while standing at sink with Set-up Assistance.--MET 1/18/2017  Toileting from toilet with Moderate Assistance for hygiene and clothing management.   Toilet transfer to bedside commode with Supervision.  Upper extremity exercise program x15 reps per handout, with assistance as needed.--MET 1/18/2017                  Plan:  Patient to be seen 5 x/week to address the above listed problems via self-care/home management, therapeutic activities, therapeutic exercises  Plan of Care expires: 11/19/2016  Plan of Care reviewed with: patient    DULCE Roper, MS  01/18/2017

## 2017-01-19 LAB
ALBUMIN SERPL BCP-MCNC: 1.9 G/DL
ALP SERPL-CCNC: 434 U/L
ALT SERPL W/O P-5'-P-CCNC: 53 U/L
ANION GAP SERPL CALC-SCNC: 9 MMOL/L
AST SERPL-CCNC: 157 U/L
BILIRUB SERPL-MCNC: 2.2 MG/DL
BUN SERPL-MCNC: 44 MG/DL
CALCIUM SERPL-MCNC: 8.8 MG/DL
CHLORIDE SERPL-SCNC: 106 MMOL/L
CO2 SERPL-SCNC: 22 MMOL/L
CREAT SERPL-MCNC: 1.3 MG/DL
EST. GFR  (AFRICAN AMERICAN): 52 ML/MIN/1.73 M^2
EST. GFR  (NON AFRICAN AMERICAN): 45 ML/MIN/1.73 M^2
GLUCOSE SERPL-MCNC: 86 MG/DL
MAGNESIUM SERPL-MCNC: 1.8 MG/DL
POTASSIUM SERPL-SCNC: 3.4 MMOL/L
PROT SERPL-MCNC: 6.6 G/DL
SODIUM SERPL-SCNC: 137 MMOL/L

## 2017-01-19 PROCEDURE — 25000003 PHARM REV CODE 250: Performed by: HOSPITALIST

## 2017-01-19 PROCEDURE — 25000003 PHARM REV CODE 250: Performed by: INTERNAL MEDICINE

## 2017-01-19 PROCEDURE — 25000003 PHARM REV CODE 250: Performed by: EMERGENCY MEDICINE

## 2017-01-19 PROCEDURE — 36415 COLL VENOUS BLD VENIPUNCTURE: CPT

## 2017-01-19 PROCEDURE — 80053 COMPREHEN METABOLIC PANEL: CPT

## 2017-01-19 PROCEDURE — 83735 ASSAY OF MAGNESIUM: CPT

## 2017-01-19 PROCEDURE — 97116 GAIT TRAINING THERAPY: CPT

## 2017-01-19 PROCEDURE — 97110 THERAPEUTIC EXERCISES: CPT

## 2017-01-19 PROCEDURE — 21400001 HC TELEMETRY ROOM

## 2017-01-19 RX ADMIN — LORAZEPAM 1 MG: 0.5 TABLET ORAL at 08:01

## 2017-01-19 RX ADMIN — Medication 3 ML: at 06:01

## 2017-01-19 RX ADMIN — LORAZEPAM 1 MG: 0.5 TABLET ORAL at 07:01

## 2017-01-19 RX ADMIN — RIFAXIMIN 550 MG: 550 TABLET ORAL at 08:01

## 2017-01-19 RX ADMIN — CIPROFLOXACIN HYDROCHLORIDE 250 MG: 250 TABLET, FILM COATED ORAL at 08:01

## 2017-01-19 RX ADMIN — FOLIC ACID 1 MG: 1 TABLET ORAL at 08:01

## 2017-01-19 RX ADMIN — LACTULOSE 30 G: 10 SOLUTION ORAL at 10:01

## 2017-01-19 RX ADMIN — Medication 3 ML: at 10:01

## 2017-01-19 RX ADMIN — ESCITALOPRAM OXALATE 10 MG: 10 TABLET ORAL at 08:01

## 2017-01-19 RX ADMIN — Medication 3 ML: at 03:01

## 2017-01-19 NOTE — PT/OT/SLP PROGRESS
Physical Therapy  Treatment    Tanajayshree Brewster   MRN: [id number]   Admitting Diagnosis: Generalized weakness    PT Received On: 17  PT Start Time: 959     PT Stop Time:     PT Total Time (min): 23 min       Billable Minutes:  Gait [id number] and Therapeutic Exercise 10    Treatment Type: Treatment  PT/PTA: PTA     PTA Visit Number: 1       General Precautions: Standard, fall  Orthopedic Precautions: N/A   Braces: N/A         Subjective:  Communicated with nurse Henley prior to session.  Pt agreeable to therapy.     Pain Ratin/10              Pain Rating Post-Intervention: 0/10    Objective:   Patient found with: telemetry    Functional Mobility:  Bed Mobility:   Rolling/Turning to Left: Supervision  Scooting/Bridging: Stand by Assistance  Supine to Sit: Contact Guard Assistance    Transfers: from bed and from bedside chair.  VC's for safety technique and walker management.   Sit <> Stand Assistance: Contact Guard Assistance  Sit <> Stand Assistive Device: Rolling Walker  Bed <> Chair Technique: Stand Pivot  Bed <> Chair Assistance: Contact Guard Assistance  Bed <> Chair Assistive Device: No Assistive Device HHA    Gait:   Gait Distance: ~ 4-5 steps form bed to bedside chair and ~ 60 ft, bedside chair followed. Slow maverick. VC's for technique and walker management.   Assistance 1: Contact Guard Assistance  Gait Assistive Device: Rolling walker  Gait Pattern: 3-point gait  Gait Deviation(s): decreased maverick, increased time in double stance, decreased velocity of limb motion, decreased step length, decreased stride length      Balance:   Static Sit: FAIR+: Able to take MINIMAL challenges from all directions  Dynamic Sit: FAIR: Cannot move trunk without losing balance  Static Stand: FAIR: Maintains without assist but unable to take challenges  Dynamic stand: FAIR: Needs CONTACT GUARD during gait     Therapeutic Activities and Exercises:  Pt performed bed mobility, transfer and gait training as above.  Pt  performed seated BLE x 15 reps : heel/toes raise, LAQ, HS, hip flexion. VC's for proper and sequence. Pt tolerated well.     AM-PAC 6 CLICK MOBILITY  How much help from another person does this patient currently need?   1 = Unable, Total/Dependent Assistance  2 = A lot, Maximum/Moderate Assistance  3 = A little, Minimum/Contact Guard/Supervision  4 = None, Modified [place]/Independent    Turning over in bed (including adjusting bedclothes, sheets and blankets)?: 4  Sitting down on and standing up from a chair with arms (e.g., wheelchair, bedside commode, etc.): 3  Moving from lying on back to sitting on the side of the bed?: 4  Moving to and from a bed to a chair (including a wheelchair)?: 3  Need to walk in hospital room?: 3  Climbing 3-5 steps with a railing?: 3  Total Score: 20    AM-PAC Raw Score CMS G-Code Modifier Level of Impairment Assistance   6 % Total / Unable   7 - 9 CM 80 - 100% Maximal Assist   10 - 14 CL 60 - 80% Moderate Assist   15 - 19 CK 40 - 60% Moderate Assist   20 - 22 CJ 20 - 40% Minimal Assist   23 CI 1-20% SBA / CGA   24 CH 0% Independent/ Mod I     Patient left up in chair with all lines intact, call button in reach and mother present.    Assessment:  Tana Brewster is a 61 y.o. female with a medical diagnosis of Generalized weakness and presents with weakness, decreased endurance and functional mobility. Pt will benefit from further skilled therapy .    Rehab identified problem list/impairments: Rehab identified problem list/impairments: weakness, impaired endurance, impaired self care skills, impaired functional mobilty, gait instability, impaired balance, decreased coordination, decreased lower extremity function, impaired cardiopulmonary response to activity    Rehab potential is good.    Activity tolerance: Good-    Discharge recommendations: Discharge Facility/Level Of Care Needs: nursing facility, skilled .     Barriers to discharge: Barriers to Discharge: Inaccessible home  environment, Decreased caregiver support    Equipment recommendations: Equipment Needed After Discharge: none     GOALS:   Physical Therapy Goals        Problem: Physical Therapy Goal    Goal Priority Disciplines Outcome Goal Variances Interventions   Physical Therapy Goal     PT/OT, PT Ongoing (interventions implemented as appropriate)     Description:  Goals to be met by: 17    Patient will increase functional independence with mobility by performin. Supine to sit with supervision  2. Sit to stand transfer supervision with RW  3. Gait 50ft with RW and supervision  4. Lower extremity exercise program x30 reps per handout, with supervision                 PLAN:    Patient to be seen 6 x/week (saturday or  )  to address the above listed problems via gait training, therapeutic activities, therapeutic exercises  Plan of Care expires: 17  Plan of Care reviewed with: patient         Mary Carrera, PTA  2017

## 2017-01-19 NOTE — NURSING
Patient resting comfortably. Patient has has several bowel movements this evening, states no pain at this time. Will continue to monitor.

## 2017-01-19 NOTE — PLAN OF CARE
Problem: Fall Risk (Adult)  Intervention: Monitor/Assist with Self Care    17   Activity   Activity Assistance Provided independent   Daily Care Interventions   Self-Care Promotion independence encouraged;BADL personal objects within reach;BADL personal routines maintained;meal setup provided;safe use of adaptive equipment encouraged   Functional Level Current   Ambulation 1 - assistive equipment   Transferring 2 - assistive person   Toileting 2 - assistive person   Bathing 2 - assistive person   Dressing 2 - assistive person   Eating 0 - independent   Communication 0 - understands/communicates without difficulty   Swallowing 0 - swallows foods/liquids without difficulty       Intervention: Reduce Risk/Promote Restraint Free Environment    17   Safety Interventions   Safety Precautions emergency equipment at bedside   Safety Interventions   Environmental Safety Modification clutter free environment maintained   Prevent [place] Drop/Fall   Safety/Security Measures bed alarm set       Intervention: Review Medications/Identify Contributors to Fall Risk    17   Safety Interventions   Medication Review/Management medications reviewed;high risk medications identified       Intervention: Patient Rounds    17   Safety Interventions   Patient Rounds bed in low position;clutter free environment maintained;placement of personal items at bedside;call light in reach;bed wheels locked;ID band on;toileting offered;visualized patient       Intervention: Safety Promotion/Fall Prevention    17   Safety Interventions   Safety Promotion/Fall Prevention bed alarm set;Fall Risk reviewed with patient/family;Fall Risk signage in place;lighting adjusted;medications reviewed;nonskid shoes/slippers when out of bed         Goal: Identify Related Risk Factors and Signs and Symptoms  Related risk factors and signs and symptoms are identified upon initiation of Human Response Clinical  Practice Guideline (CPG)   Outcome: Ongoing (interventions implemented as appropriate)    01/19/17 0049   Fall Risk   Related Risk Factors (Fall Risk) age-related changes;gait/mobility problems;history of falls;sleep pattern alteration;slipper/uneven surfaces;environment unfamiliar   Signs and Symptoms (Fall Risk) presence of risk factors       Goal: Absence of Falls  Patient will demonstrate the desired outcomes by discharge/transition of care.   Outcome: Ongoing (interventions implemented as appropriate)    01/19/17 0049   Fall Risk (Adult)   Absence of Falls making progress toward outcome         Problem: Infection, Risk/Actual (Adult)  Intervention: Manage Suspected/Actual Infection    01/19/17 0049   Safety Interventions   Infection Management aseptic technique maintained   Prevent/Manage Colorectal Surgical Infection   Fever Reduction/Comfort Measures lightweight clothing;lightweight bedding       Intervention: Prevent Infection/Maximize Resistance    01/19/17 0049   Nutrition Interventions   Oral Nutrition Promotion rest periods promoted;safe use of adaptive equipment encouraged   Respiratory Interventions   Airway/Ventilation Management calming measures promoted   Pain/Comfort/Sleep Interventions   Sleep/Rest Enhancement awakenings minimized;regular sleep/rest pattern promoted         Goal: Identify Related Risk Factors and Signs and Symptoms  Related risk factors and signs and symptoms are identified upon initiation of Human Response Clinical Practice Guideline (CPG)   Outcome: Ongoing (interventions implemented as appropriate)    01/19/17 0049   Infection, Risk/Actual   Related Risk Factors (Infection, Risk/Actual) prolonged hospitalization;sleep disturbance;tissue perfusion altered   Signs and Symptoms (Infection, Risk/Actual) weakness;malaise;edema         Problem: Patient Care Overview  Goal: Plan of Care Review  Outcome: Ongoing (interventions implemented as appropriate)    01/19/17 0049    Coping/Psychosocial   Plan Of Care Reviewed With patient       Goal: Individualization & Mutuality  Outcome: Ongoing (interventions implemented as appropriate)    01/19/17 0049   Right Care Assessment   Number of comorbid conditions (as recorded on the chart) Alcohol abuse;Liver disease       Goal: Discharge Needs Assessment  Outcome: Ongoing (interventions implemented as appropriate)    01/09/17 1426 01/19/17 0049   OTHER   Communicated expected length of stay with patient/caregiver --  no   Is patient able to care for self after discharge? --  Yes   Who are your caregiver(s) and their phone number(s)? Kandace pt daughter [phone] --    Patient currently receives home health services? --  No   Living Environment   Able to Return to Prior Arrangements --  yes   Discharge Needs Assessment   How many people do you have in your home that can help with your care after discharge? --  1   Readmission Within The Last 30 Days --  no previous admission in last 30 days   Activity/Self Care ROS   Equipment Currently Used at Home --  cane, straight;shower chair   Living Environment   Transportation Available --  car;family or friend will provide   Social Work Plan   Patient's perception of discharge disposition home or selfcare --    Patient/Family In Agreement With Plan --  yes       Goal: Interdisciplinary Rounds/Family Conf  Outcome: Ongoing (interventions implemented as appropriate)    01/19/17 0049   Interdisciplinary Rounds/Family Conf   Participants ;physician         Problem: Pressure Ulcer Risk (Gerhard Scale) (Adult,Obstetrics,Pediatric)  Intervention: Promote/Optimize Nutrition    01/19/17 0049   Nutrition Interventions   Oral Nutrition Promotion rest periods promoted;safe use of adaptive equipment encouraged       Intervention: Prevent/Manage Excess Moisture    01/19/17 0049   Hygiene Care   Bathing/Skin Care linen changed   Skin Interventions   Skin Protection adhesive use limited;tubing/devices  free from skin contact       Intervention: Maintain Head of Bed Elevation Less Than 30 Degrees as Tolerated    01/19/17 0049   Positioning   Head of Bed (HOB) HOB at 30 degrees       Intervention: Prevent/Minimize Sheer/Friction Injuries    01/19/17 0049   Positioning   Positioning/Transfer Devices pillows   Skin Interventions   Pressure Reduction Devices pressure-redistributing mattress utilized   Pressure Reduction Techniques rest period provided between sit times;frequent weight shift encouraged       Intervention: Turn/Reposition Often    01/19/17 0049   Positioning   Body Position supine, head elevated   Skin Interventions   Pressure Reduction Techniques rest period provided between sit times;frequent weight shift encouraged         Goal: Identify Related Risk Factors and Signs and Symptoms  Related risk factors and signs and symptoms are identified upon initiation of Human Response Clinical Practice Guideline (CPG)   Outcome: Ongoing (interventions implemented as appropriate)    01/19/17 0049   Pressure Ulcer Risk (Gerhard Scale)   Related Risk Factors (Pressure Ulcer Risk (Gerhard Scale)) excretions/secretions;fluid intake inadequate;mobility impaired       Goal: Skin Integrity  Patient will demonstrate the desired outcomes by discharge/transition of care.   Outcome: Ongoing (interventions implemented as appropriate)    01/19/17 0049   Pressure Ulcer Risk (Gerhard Scale) (Adult,Obstetrics,Pediatric)   Skin Integrity making progress toward outcome         Problem: Liver Failure, Acute/Chronic (Adult)  Intervention: Promote Neurologic Homeostasis    01/19/17 0049   Safety Interventions   Seizure Precautions emergency equipment at bedside   Activity   Activity Type activity adjusted per tolerance   Cognitive Interventions   Sensory Stimulation Regulation lighting decreased;quiet environment promoted       Intervention: Provide Oxygenation/Ventilation/Perfusion Support    01/19/17 0049   Activity   Activity Type  activity adjusted per tolerance   Positioning   Head of Bed (HOB) HOB at 30 degrees   Respiratory Interventions   Airway/Ventilation Management calming measures promoted       Intervention: Monitor/Manage Pain    01/19/17 0049   Safety Interventions   Medication Review/Management medications reviewed;high risk medications identified   Pain/Comfort/Sleep Interventions   Pain Management Interventions care clustered;position adjusted;quiet environment facilitated       Intervention: Support/Optimize Psychosocial Response to Liver Disease    01/19/17 0049   Psychosocial Support   Diversional Activities television   Family/Support System Care involvement promoted       Intervention: Prevent/Manage Infection    01/19/17 0049   Safety Interventions   Infection Prevention hydration promoted   Infection Management aseptic technique maintained   Prevent/Manage Colorectal Surgical Infection   Fever Reduction/Comfort Measures lightweight clothing;lightweight bedding       Intervention: Manage Hepatic Encephalopathy    01/19/17 0049   Neurological Interventions   Hepatic Encephalopathy Management airway protection maintained       Intervention: Manage Bleeding Risk    01/19/17 0049   Safety Interventions   Bleeding Management dressing monitored   Bleeding Precautions monitored for signs of bleeding       Intervention: Optimize/Manage Nutrition    01/19/17 0049   Nutrition Interventions   Oral Nutrition Promotion rest periods promoted;safe use of adaptive equipment encouraged   Coping/Psychosocial Interventions   Environmental Support calm environment promoted       Intervention: Monitor/Manage Fluid and Electrolyte Balance    01/19/17 0049   Nutrition Interventions   Fluid/Electrolyte Management fluids adjusted;electrolyte supplement adjusted         Goal: Signs and Symptoms of Listed Potential Problems Will be Absent, Minimized or Managed (Liver Failure, Acute/Chronic)  Signs and symptoms of listed potential problems will be  absent, minimized or managed by discharge/transition of care (reference Liver Failure, Acute/Chronic (Adult) CPG).   Outcome: Ongoing (interventions implemented as appropriate)    01/19/17 0049   Liver Failure, Acute/Chronic   Problems Assessed (Liver Failure, Acute/Chronic) all   Problems Present (Liver Failure, Acute/Chronic) fluid/electrolyte imbalance

## 2017-01-19 NOTE — SUBJECTIVE & OBJECTIVE
Interval History:  No new issues.     Review of Systems   Constitutional: Negative for activity change.   HENT: Negative for congestion.    Respiratory: Negative for chest tightness and shortness of breath.    Cardiovascular: Negative for chest pain.   Gastrointestinal: Positive for abdominal distention.     Objective:     Vital Signs (Most Recent):  Temp: 97.9 °F (36.6 °C) (01/19/17 0811)  Pulse: 84 (01/19/17 0811)  Resp: 18 (01/19/17 0811)  BP: (!) 143/67 (01/19/17 0811)  SpO2: (!) 93 % (01/19/17 0811) Vital Signs (24h Range):  Temp:  [97.7 °F (36.5 °C)-98.2 °F (36.8 °C)] 97.9 °F (36.6 °C)  Pulse:  [84-93] 84  Resp:  [17-18] 18  SpO2:  [93 %-96 %] 93 %  BP: (106-143)/(55-80) 143/67     Weight: 80 kg (176 lb 5.9 oz)  Body mass index is 32.26 kg/(m^2).    Intake/Output Summary (Last 24 hours) at 01/19/17 0835  Last data filed at 01/18/17 1300   Gross per 24 hour   Intake              358 ml   Output                0 ml   Net              358 ml      Physical Exam   Constitutional: She appears well-developed and well-nourished.   Eyes: Pupils are equal, round, and reactive to light.   Cardiovascular: Normal rate.    Pulmonary/Chest: Effort normal.   Abdominal: Soft.   Neurological: She is alert.       Significant Labs:   BMP:   Recent Labs  Lab 01/19/17  0554   GLU 86      K 3.4*      CO2 22*   BUN 44*   CREATININE 1.3   CALCIUM 8.8   MG 1.8     CBC: No results for input(s): WBC, HGB, HCT, PLT in the last 48 hours.    Significant Imaging:

## 2017-01-19 NOTE — PROGRESS NOTES
TAPAN spoke with Gabrielle () @ [facility] to discuss pt insurance. Gabrielle informed TAPAN she has been unsuccessful getting in contact with someone to get authorization for pt SNF. Per Gabrielle the individual she did speak with informed her it will take two-three business days before they will provide authorization for SNF. TAPAN spoke with Kyra with  to determine if there is a  Gabrielle is able to speak with concerning pt SNF. Kyra provided TAPAN with [facility] contact information. TAPAN contacted Gabrielle to provide her with [facility] information. Gabrielle informed TAPAN she will contact TAPAN if anything changes but per Gabrielle she spoke with someone who insured her the process is usually two-three days.

## 2017-01-19 NOTE — ASSESSMENT & PLAN NOTE
Multifactorial due to ARF, UTI, chronic alcoholism, malnutrition with resultant deconditioning, debility, functional impaired mobility and balance. Treated underlying conditions with steady improvement, and continue therapy with PT/OT. Pt making good progress and is motivated, excellent rehab candidate. Patient was to go to [facility] yesterday but insurance denied but will pay for SNF. Discussed with  and patient will likely be discharged to SNF today.

## 2017-01-19 NOTE — NURSING
Pt resting in bed quietly. No complaints of pain. Fall and safety precautions maintained. Bed locked in lowest position, with side rails up x2. Call bell and personal items within reach. Shift report given to CINDY Parsons.

## 2017-01-19 NOTE — PROGRESS NOTES
[facility] Hospital Medicine  Progress Note    Patient Name: Tana Brewster  MRN: [id number]  Patient Class: IP- Inpatient   Admission Date: 1/8/2017  Length of Stay: 11 days  Attending Physician: Augie Jackson MD  Primary Care Provider: Pablo Browne MD        Subjective:     Principal Problem:Generalized weakness    HPI:  Tana Brewster is a 61 y.o. female that  has a past medical history of Crohn disease and Hypertension. Presents to [facility] complaining of generalized weakness.  She has a long-standing history of chronic alcoholism.  She states she has had subacute onset of generalized weakness that is progressively worsened over the last week.  Her symptoms are constant.  She thinks they are possibly exacerbated by decreased appetite and oral intake.  Associated symptoms include ataxia and bilateral lower extremity weakness.  Associated symptoms also include intermittent dysuria and she feels like she has a urinary tract infection.  She denies abdominal pain but does have some suprapubic discomfort.  She had a normal bowel movement this morning and denies melena, hematochezia, hematemesis, nausea, vomiting, chest pain, shortness of breath, syncope, vertigo, or tremors, fever, chills, or skin lesions or infections.    In the emergency department routine laboratory studies were obtained which demonstrated multiple abnormalities including evidence of urinary tract infection and electrolyte abnormalities.      Hospital medicine has been asked to admit for further evaluation and treatment.         Hospital Course:  60 y/o with cirrhosis due to ETOH abuse who was admitted for encephalopathy, UTI and electrolyte abnormalities. Pt was treated with Rocephin for UTI and had ascites evaluated/treated with paracentesis, and mentation cleared. Pt had persistent hypokalemia now improved. Pt with marked physical debility and deconditioning requiring therapy. The  recommendation was for rehab. The patient was to be transferred to [facility] rehab but this was denied. The patient will be going to [facility]. The patient's E-coli UTI was treated.  The rest of the hospital course was unremarkable. Activity will be tolerated as per PT/OT  Diet- low NA. Follow up with PCP in one week and GI with Dr. Stevens in 2 weeks.      Interval History:  No new issues.     Review of Systems   Constitutional: Negative for activity change.   HENT: Negative for congestion.    Respiratory: Negative for chest tightness and shortness of breath.    Cardiovascular: Negative for chest pain.   Gastrointestinal: Positive for abdominal distention.     Objective:     Vital Signs (Most Recent):  Temp: 97.9 °F (36.6 °C) (01/19/17 0811)  Pulse: 84 (01/19/17 0811)  Resp: 18 (01/19/17 0811)  BP: (!) 143/67 (01/19/17 0811)  SpO2: (!) 93 % (01/19/17 0811) Vital Signs (24h Range):  Temp:  [97.7 °F (36.5 °C)-98.2 °F (36.8 °C)] 97.9 °F (36.6 °C)  Pulse:  [84-93] 84  Resp:  [17-18] 18  SpO2:  [93 %-96 %] 93 %  BP: (106-143)/(55-80) 143/67     Weight: 80 kg (176 lb 5.9 oz)  Body mass index is 32.26 kg/(m^2).    Intake/Output Summary (Last 24 hours) at 01/19/17 0835  Last data filed at 01/18/17 1300   Gross per 24 hour   Intake              358 ml   Output                0 ml   Net              358 ml      Physical Exam   Constitutional: She appears well-developed and well-nourished.   Eyes: Pupils are equal, round, and reactive to light.   Cardiovascular: Normal rate.    Pulmonary/Chest: Effort normal.   Abdominal: Soft.   Neurological: She is alert.       Significant Labs:   BMP:   Recent Labs  Lab 01/19/17  0554   GLU 86      K 3.4*      CO2 22*   BUN 44*   CREATININE 1.3   CALCIUM 8.8   MG 1.8     CBC: No results for input(s): WBC, HGB, HCT, PLT in the last 48 hours.    Significant Imaging:     Assessment/Plan:      * Generalized weakness  Multifactorial due to ARF, UTI, chronic alcoholism, malnutrition  with resultant deconditioning, debility, functional impaired mobility and balance. Treated underlying conditions with steady improvement, and continue therapy with PT/OT. Pt making good progress and is motivated, excellent rehab candidate. Patient was to go to [facility] yesterday but insurance denied but will pay for SNF. Discussed with  and patient will likely be discharged to SNF today.       Ascites due to alcoholic cirrhosis  Paracentesis by IR on 1/9 with >4 L removed. Improved. Pt's abdomen is chronically distended due to chronic ascites that persists. She does not have any abdominal pain and has multiple bowel movements that is expected due to lactulose. Stable.      Chronic alcohol abuse  Pt reports she has stopped drinking for the past 2 months. Continued sobriety encouraged. No signs of withdrawal and she is out of window for withdrawal. Supplement with thiamine and folic acid. Stable. Noted Elevated LFT's.     Depression  On Zoloft.     VTE Risk Mitigation         Ordered     Medium Risk of VTE  Once      01/08/17 2145     Place sequential compression device  Until discontinued      01/08/17 2145     Place SARAH hose  Until discontinued      01/08/17 2145      will discharge likely to [facility] today.     Augie Corbin MD  Department of Hospital Medicine   [facility]

## 2017-01-20 LAB
ALBUMIN SERPL BCP-MCNC: 1.9 G/DL
ALP SERPL-CCNC: 416 U/L
ALT SERPL W/O P-5'-P-CCNC: 52 U/L
ANION GAP SERPL CALC-SCNC: 10 MMOL/L
AST SERPL-CCNC: 138 U/L
BILIRUB SERPL-MCNC: 2.1 MG/DL
BUN SERPL-MCNC: 45 MG/DL
CALCIUM SERPL-MCNC: 8.6 MG/DL
CHLORIDE SERPL-SCNC: 106 MMOL/L
CO2 SERPL-SCNC: 20 MMOL/L
CREAT SERPL-MCNC: 1.3 MG/DL
EST. GFR  (AFRICAN AMERICAN): 52 ML/MIN/1.73 M^2
EST. GFR  (NON AFRICAN AMERICAN): 45 ML/MIN/1.73 M^2
GLUCOSE SERPL-MCNC: 85 MG/DL
MAGNESIUM SERPL-MCNC: 1.8 MG/DL
POTASSIUM SERPL-SCNC: 3.4 MMOL/L
PROT SERPL-MCNC: 6.7 G/DL
SODIUM SERPL-SCNC: 136 MMOL/L

## 2017-01-20 PROCEDURE — 97116 GAIT TRAINING THERAPY: CPT

## 2017-01-20 PROCEDURE — 80053 COMPREHEN METABOLIC PANEL: CPT

## 2017-01-20 PROCEDURE — 25000003 PHARM REV CODE 250: Performed by: HOSPITALIST

## 2017-01-20 PROCEDURE — 83735 ASSAY OF MAGNESIUM: CPT

## 2017-01-20 PROCEDURE — 36415 COLL VENOUS BLD VENIPUNCTURE: CPT

## 2017-01-20 PROCEDURE — 25000003 PHARM REV CODE 250: Performed by: INTERNAL MEDICINE

## 2017-01-20 PROCEDURE — 25000003 PHARM REV CODE 250: Performed by: EMERGENCY MEDICINE

## 2017-01-20 PROCEDURE — 97530 THERAPEUTIC ACTIVITIES: CPT

## 2017-01-20 PROCEDURE — 21400001 HC TELEMETRY ROOM

## 2017-01-20 RX ADMIN — LORAZEPAM 1 MG: 0.5 TABLET ORAL at 08:01

## 2017-01-20 RX ADMIN — Medication 3 ML: at 06:01

## 2017-01-20 RX ADMIN — LORAZEPAM 1 MG: 0.5 TABLET ORAL at 10:01

## 2017-01-20 RX ADMIN — CIPROFLOXACIN HYDROCHLORIDE 250 MG: 250 TABLET, FILM COATED ORAL at 09:01

## 2017-01-20 RX ADMIN — CIPROFLOXACIN HYDROCHLORIDE 250 MG: 250 TABLET, FILM COATED ORAL at 08:01

## 2017-01-20 RX ADMIN — FOLIC ACID 1 MG: 1 TABLET ORAL at 09:01

## 2017-01-20 RX ADMIN — RIFAXIMIN 550 MG: 550 TABLET ORAL at 09:01

## 2017-01-20 RX ADMIN — Medication 3 ML: at 08:01

## 2017-01-20 RX ADMIN — LACTULOSE 30 G: 10 SOLUTION ORAL at 01:01

## 2017-01-20 RX ADMIN — RIFAXIMIN 550 MG: 550 TABLET ORAL at 08:01

## 2017-01-20 RX ADMIN — ESCITALOPRAM OXALATE 10 MG: 10 TABLET ORAL at 09:01

## 2017-01-20 NOTE — ASSESSMENT & PLAN NOTE
Multifactorial due to ARF, UTI, chronic alcoholism, malnutrition with resultant deconditioning, debility, functional impaired mobility and balance. Treated underlying conditions with steady improvement, and continue therapy with PT/OT. Pt making good progress and is motivated, excellent rehab candidate. Patient was to go to [facility] yesterday but insurance denied but will pay for SNF. Discussed with  and patient will likely be discharged to SNF- when arranged.

## 2017-01-20 NOTE — SUBJECTIVE & OBJECTIVE
Interval History:     Review of Systems   Constitutional: Negative for activity change.   HENT: Negative for congestion.    Respiratory: Negative for chest tightness and shortness of breath.    Cardiovascular: Negative for chest pain.   Gastrointestinal: Positive for abdominal distention.     Objective:     Vital Signs (Most Recent):  Temp: 99.5 °F (37.5 °C) (01/20/17 0753)  Pulse: 89 (01/20/17 0753)  Resp: 18 (01/20/17 0753)  BP: 130/61 (01/20/17 0753)  SpO2: (!) 93 % (01/19/17 2000) Vital Signs (24h Range):  Temp:  [96.4 °F (35.8 °C)-99.5 °F (37.5 °C)] 99.5 °F (37.5 °C)  Pulse:  [80-91] 89  Resp:  [16-20] 18  SpO2:  [93 %-97 %] 93 %  BP: (119-139)/(56-74) 130/61     Weight: 82.1 kg (181 lb)  Body mass index is 33.11 kg/(m^2).    Intake/Output Summary (Last 24 hours) at 01/20/17 0948  Last data filed at 01/20/17 0500   Gross per 24 hour   Intake                0 ml   Output                0 ml   Net                0 ml      Physical Exam   Constitutional: She appears well-developed and well-nourished.   Eyes: Pupils are equal, round, and reactive to light.   Cardiovascular: Normal rate.    Pulmonary/Chest: Effort normal.   Abdominal: Soft.   Neurological: She is alert.       Significant Labs:   BMP:   Recent Labs  Lab 01/20/17  0441   GLU 85      K 3.4*      CO2 20*   BUN 45*   CREATININE 1.3   CALCIUM 8.6*   MG 1.8     CBC: No results for input(s): WBC, HGB, HCT, PLT in the last 48 hours.    Significant Imaging:

## 2017-01-20 NOTE — PT/OT/SLP PROGRESS
"Physical Therapy  Treatment    Tanajayshree Brewster   MRN: [id number]   Admitting Diagnosis: Generalized weakness    PT Received On: 17  PT Start Time: 1432     PT Stop Time: 1458    PT Total Time (min): 26 min       Billable Minutes:  Gait [id number] and Therapeutic Activity 8    Treatment Type: Treatment  PT/PTA: PTA     PTA Visit Number: 2       General Precautions: Standard, fall  Orthopedic Precautions: N/A   Braces: N/A         Subjective:  Communicated with nurse Monserrat prior to session.  Pt agreeable to therapy. " I am stressed out "     Pain Ratin/10   pt c/o nausea , nurse notified.            Pain Rating Post-Intervention: 0/10    Objective:   Patient found with: telemetry    Functional Mobility:  Bed Mobility:   Rolling/Turning to Left: Supervision  Scooting/Bridging: Stand by Assistance  Supine to Sit: Stand by Assistance  Sit to Supine: Stand by Assistance    Transfers: x 4 trials.  From bed and bedside chair. VC's for technique and walker management.   Sit <> Stand Assistance: Contact Guard Assistance, Minimum Assistance  Sit <> Stand Assistive Device: No Assistive Device, Rolling Walker  Bed <> Chair Technique: Stand Pivot  Bed <> Chair Assistance: Contact Guard Assistance  Bed <> Chair Assistive Device: No Assistive Device, HHA    Gait:   Gait Distance: ~ 4-5 steps from bed to bedside chair  And ~ 94 ft with 1 seated rest break  . Slow maverick, VC's for technique and walker management.  Assistance 1: Contact Guard Assistance  Gait Assistive Device: Rolling walker  Gait Pattern: reciprocal  Gait Deviation(s): decreased maverick, decreased velocity of limb motion, decreased step length      Balance:   Static Sit: FAIR+: Able to take MINIMAL challenges from all directions  Dynamic Sit: FAIR: Cannot move trunk without losing balance  Static Stand: FAIR: Maintains without assist but unable to take challenges  Dynamic stand: FAIR: Needs CONTACT GUARD during gait     Therapeutic Activities and Exercises:  Pt " performed bed mobility, transfer and gait as above.      AM-PAC 6 CLICK MOBILITY  How much help from another person does this patient currently need?   1 = Unable, Total/Dependent Assistance  2 = A lot, Maximum/Moderate Assistance  3 = A little, Minimum/Contact Guard/Supervision  4 = None, Modified [place]/Independent    Turning over in bed (including adjusting bedclothes, sheets and blankets)?: 4  Sitting down on and standing up from a chair with arms (e.g., wheelchair, bedside commode, etc.): 3  Moving from lying on back to sitting on the side of the bed?: 4  Moving to and from a bed to a chair (including a wheelchair)?: 3  Need to walk in hospital room?: 3  Climbing 3-5 steps with a railing?: 3  Total Score: 20    AM-PAC Raw Score CMS G-Code Modifier Level of Impairment Assistance   6 % Total / Unable   7 - 9 CM 80 - 100% Maximal Assist   10 - 14 CL 60 - 80% Moderate Assist   15 - 19 CK 40 - 60% Moderate Assist   20 - 22 CJ 20 - 40% Minimal Assist   23 CI 1-20% SBA / CGA   24 CH 0% Independent/ Mod I     Patient left HOB elevated with all lines intact, call button in reach and nurse Monserrat notified.    Assessment:  Tana Brewster is a 61 y.o. female with a medical diagnosis of Generalized weakness and presents with weakness, decreased endurance and functional mobility. Pt tolerated treatment well today. Pt will benefit from further skilled therapy.     Rehab identified problem list/impairments: Rehab identified problem list/impairments: weakness, impaired endurance, impaired self care skills, impaired functional mobilty, gait instability, impaired balance, pain, impaired cardiopulmonary response to activity    Rehab potential is good.    Activity tolerance: Good    Discharge recommendations: Discharge Facility/Level Of Care Needs: nursing facility, skilled     Barriers to discharge: Barriers to Discharge: Inaccessible home environment, Decreased caregiver support    Equipment recommendations: Equipment  Needed After Discharge: none     GOALS:   Physical Therapy Goals        Problem: Physical Therapy Goal    Goal Priority Disciplines Outcome Goal Variances Interventions   Physical Therapy Goal     PT/OT, PT Ongoing (interventions implemented as appropriate)     Description:  Goals to be met by: 17    Patient will increase functional independence with mobility by performin. Supine to sit with supervision  2. Sit to stand transfer supervision with RW  3. Gait 50ft with RW and supervision  4. Lower extremity exercise program x30 reps per handout, with supervision                 PLAN:    Patient to be seen 6 x/week (saturday or  )  to address the above listed problems via gait training, therapeutic activities, therapeutic exercises  Plan of Care expires: 17  Plan of Care reviewed with: patient         Mary Carrera, PTA  2017

## 2017-01-20 NOTE — PLAN OF CARE
Problem: Fall Risk (Adult)  Intervention: Monitor/Assist with Self Care    17 0049 17   Activity   Activity Assistance Provided --  assistance, 1 person --    Daily Care Interventions   Self-Care Promotion independence encouraged;BADL personal objects within reach;BADL personal routines maintained;meal setup provided;safe use of adaptive equipment encouraged --  --    Functional Level Current   Ambulation --  --  2 - assistive person   Transferring --  --  2 - assistive person   Toileting --  --  2 - assistive person   Bathing --  --  2 - assistive person   Dressing --  --  0 - independent   Eating --  --  0 - independent   Communication --  --  0 - understands/communicates without difficulty   Swallowing --  --  0 - swallows foods/liquids without difficulty       Intervention: Reduce Risk/Promote Restraint Free Environment    17   Safety Interventions   Safety Precautions emergency equipment at bedside   Safety Interventions   Environmental Safety Modification clutter free environment maintained   Prevent [place] Drop/Fall   Safety/Security Measures bed alarm set       Intervention: Review Medications/Identify Contributors to Fall Risk    17   Safety Interventions   Medication Review/Management medications reviewed;high risk medications identified       Intervention: Patient Rounds    17   Safety Interventions   Patient Rounds bed in low position;bed wheels locked;call light in reach;clutter free environment maintained;ID band on;placement of personal items at bedside;toileting offered;visualized patient       Intervention: Safety Promotion/Fall Prevention    17   Safety Interventions   Safety Promotion/Fall Prevention bed alarm set;Fall Risk reviewed with patient/family;Fall Risk signage in place;high risk medications identified;medications reviewed;nonskid shoes/slippers when out of bed       Intervention: Safety Precautions    17  0500   Safety Interventions   Safety Precautions emergency equipment at bedside         Goal: Identify Related Risk Factors and Signs and Symptoms  Related risk factors and signs and symptoms are identified upon initiation of Human Response Clinical Practice Guideline (CPG)   Outcome: Ongoing (interventions implemented as appropriate)    01/19/17 0049   Fall Risk   Related Risk Factors (Fall Risk) age-related changes;gait/mobility problems;history of falls;sleep pattern alteration;slipper/uneven surfaces;environment unfamiliar   Signs and Symptoms (Fall Risk) presence of risk factors       Goal: Absence of Falls  Patient will demonstrate the desired outcomes by discharge/transition of care.   Outcome: Ongoing (interventions implemented as appropriate)    01/19/17 0049   Fall Risk (Adult)   Absence of Falls making progress toward outcome         Problem: Infection, Risk/Actual (Adult)  Intervention: Manage Suspected/Actual Infection    01/19/17 0049 01/19/17 2000   Safety Interventions   Infection Management aseptic technique maintained --    Prevent/Manage Colorectal Surgical Infection   Fever Reduction/Comfort Measures --  lightweight bedding;lightweight clothing       Intervention: Prevent Infection/Maximize Resistance    01/19/17 0049 01/19/17 2000   Nutrition Interventions   Oral Nutrition Promotion rest periods promoted;safe use of adaptive equipment encouraged --    Respiratory Interventions   Airway/Ventilation Management calming measures promoted --    Pain/Comfort/Sleep Interventions   Sleep/Rest Enhancement --  regular sleep/rest pattern promoted   Hygiene Care   Bathing/Skin Care linen changed --          Goal: Identify Related Risk Factors and Signs and Symptoms  Related risk factors and signs and symptoms are identified upon initiation of Human Response Clinical Practice Guideline (CPG)   Outcome: Ongoing (interventions implemented as appropriate)    01/19/17 0049   Infection, Risk/Actual   Related Risk  Factors (Infection, Risk/Actual) prolonged hospitalization;sleep disturbance;tissue perfusion altered   Signs and Symptoms (Infection, Risk/Actual) weakness;malaise;edema         Problem: Patient Care Overview  Goal: Plan of Care Review  Outcome: Ongoing (interventions implemented as appropriate)    01/20/17 0500   Coping/Psychosocial   Plan Of Care Reviewed With patient       Goal: Individualization & Mutuality  Outcome: Ongoing (interventions implemented as appropriate)    01/20/17 0500   Right Care Assessment   Number of comorbid conditions (as recorded on the chart) Alcohol abuse;Liver disease       Goal: Discharge Needs Assessment  Outcome: Ongoing (interventions implemented as appropriate)    01/09/17 1426 01/20/17 0500   OTHER   Communicated expected length of stay with patient/caregiver --  no   Is patient able to care for self after discharge? --  Yes   Who are your caregiver(s) and their phone number(s)? Kandace pt daughter [phone] --    If YES, was patient admitted for the same reason? --  No   Patient currently receives home health services? --  No   Living Environment   Able to Return to Prior Arrangements --  yes   Discharge Needs Assessment   How many people do you have in your home that can help with your care after discharge? --  1   Readmission Within The Last 30 Days --  no previous admission in last 30 days   Living Environment   Transportation Available --  agency transportation   Social Work Plan   Patient's perception of discharge disposition home or selfcare --    Patient/Family In Agreement With Plan --  yes       Goal: Interdisciplinary Rounds/Family Conf  Outcome: Ongoing (interventions implemented as appropriate)    01/20/17 0500   Interdisciplinary Rounds/Family Conf   Participants family;nursing;physician;patient         Problem: Pressure Ulcer Risk (Gerhard Scale) (Adult,Obstetrics,Pediatric)  Intervention: Promote/Optimize Nutrition    01/20/17 0500   Nutrition Interventions    Oral Nutrition Promotion calorie dense foods provided;calorie dense liquids provided;rest periods promoted       Intervention: Prevent/Manage Excess Moisture    01/17/17 2000 01/19/17 0049   Hygiene Care   Perineal Care diaper changed --    Bathing/Skin Care --  linen changed   Skin Interventions   Skin Protection --  adhesive use limited;tubing/devices free from skin contact       Intervention: Maintain Head of Bed Elevation Less Than 30 Degrees as Tolerated    01/20/17 0500   Positioning   Head of Bed (HOB) HOB at 30 degrees       Intervention: Prevent/Minimize Sheer/Friction Injuries    01/19/17 0049   Positioning   Positioning/Transfer Devices pillows   Skin Interventions   Pressure Reduction Devices pressure-redistributing mattress utilized   Pressure Reduction Techniques rest period provided between sit times;frequent weight shift encouraged       Intervention: Turn/Reposition Often    01/19/17 0049 01/20/17 0200   Positioning   Body Position --  supine, legs elevated;supine, head elevated   Skin Interventions   Pressure Reduction Techniques rest period provided between sit times;frequent weight shift encouraged --          Goal: Identify Related Risk Factors and Signs and Symptoms  Related risk factors and signs and symptoms are identified upon initiation of Human Response Clinical Practice Guideline (CPG)   Outcome: Ongoing (interventions implemented as appropriate)    01/19/17 0049   Pressure Ulcer Risk (Gerhard Scale)   Related Risk Factors (Pressure Ulcer Risk (Gerhard Scale)) excretions/secretions;fluid intake inadequate;mobility impaired       Goal: Skin Integrity  Patient will demonstrate the desired outcomes by discharge/transition of care.   Outcome: Ongoing (interventions implemented as appropriate)    01/19/17 1841   Pressure Ulcer Risk (Gerhard Scale) (Adult,Obstetrics,Pediatric)   Skin Integrity making progress toward outcome         Problem: Liver Failure, Acute/Chronic (Adult)  Intervention:  Promote Neurologic Homeostasis    01/19/17 0049 01/20/17 0500   Safety Interventions   Seizure Precautions --  emergency equipment at bedside   Activity   Activity Type --  activity adjusted per tolerance;activity clustered for rest period   Cognitive Interventions   Sensory Stimulation Regulation lighting decreased;quiet environment promoted --        Intervention: Provide Oxygenation/Ventilation/Perfusion Support    01/20/17 0500   Activity   Activity Type activity adjusted per tolerance;activity clustered for rest period   Positioning   Head of Bed (HOB) HOB at 30 degrees   Respiratory Interventions   Airway/Ventilation Management calming measures promoted       Intervention: Monitor/Manage Pain    01/20/17 0500   Safety Interventions   Medication Review/Management medications reviewed;high risk medications identified   Pain/Comfort/Sleep Interventions   Pain Management Interventions awakened for pain meds per patient request;pain management plan reviewed with patient/caregiver       Intervention: Support/Optimize Psychosocial Response to Liver Disease    01/20/17 0500   Coping/Psychosocial Interventions   Supportive Measures active listening utilized;self-reflection promoted;relaxation techniques promoted   Psychosocial Support   Diversional Activities smartphone;television   Family/Support System Care involvement promoted       Intervention: Prevent/Manage Infection    01/20/17 0500   Safety Interventions   Infection Prevention hydration promoted;environmental surveillance   Infection Management aseptic technique maintained   Prevent/Manage Colorectal Surgical Infection   Fever Reduction/Comfort Measures lightweight bedding;lightweight clothing       Intervention: Manage Hepatic Encephalopathy    01/20/17 0500   Neurological Interventions   Hepatic Encephalopathy Management airway protection maintained       Intervention: Manage Bleeding Risk    01/19/17 0049   Safety Interventions   Bleeding Management  dressing monitored   Bleeding Precautions monitored for signs of bleeding       Intervention: Optimize/Manage Nutrition    01/20/17 0500   Nutrition Interventions   Oral Nutrition Promotion calorie dense foods provided;calorie dense liquids provided;rest periods promoted   Coping/Psychosocial Interventions   Environmental Support calm environment promoted       Intervention: Monitor/Manage Fluid and Electrolyte Balance    01/20/17 0500   Nutrition Interventions   Fluid/Electrolyte Management fluids provided         Goal: Signs and Symptoms of Listed Potential Problems Will be Absent, Minimized or Managed (Liver Failure, Acute/Chronic)  Signs and symptoms of listed potential problems will be absent, minimized or managed by discharge/transition of care (reference Liver Failure, Acute/Chronic (Adult) CPG).     01/19/17 0049   Liver Failure, Acute/Chronic   Problems Assessed (Liver Failure, Acute/Chronic) all   Problems Present (Liver Failure, Acute/Chronic) fluid/electrolyte imbalance

## 2017-01-20 NOTE — PLAN OF CARE
Problem: Patient Care Overview  Goal: Plan of Care Review    01/19/17 1841   Coping/Psychosocial   Plan Of Care Reviewed With patient         Problem: Pressure Ulcer Risk (Gerhard Scale) (Adult,Obstetrics,Pediatric)  Goal: Skin Integrity  Patient will demonstrate the desired outcomes by discharge/transition of care.     01/19/17 1841   Pressure Ulcer Risk (Gerhard Scale) (Adult,Obstetrics,Pediatric)   Skin Integrity making progress toward outcome

## 2017-01-20 NOTE — PLAN OF CARE
Problem: Liver Failure, Acute/Chronic (Adult)  Intervention: Promote Neurologic Homeostasis    01/19/17 0049 01/20/17 0500 01/20/17 1002   Safety Interventions   Seizure Precautions --  emergency equipment at bedside --    Activity   Activity Type --  --  activity clustered for rest period   Cognitive Interventions   Sensory Stimulation Regulation lighting decreased;quiet environment promoted --  --        Intervention: Provide Oxygenation/Ventilation/Perfusion Support    01/20/17 0500 01/20/17 1002   Activity   Activity Type --  activity clustered for rest period   Positioning   Head of Bed (HOB) --  HOB at 30 degrees   Respiratory Interventions   Airway/Ventilation Management calming measures promoted --        Intervention: Monitor/Manage Pain    01/20/17 0500   Safety Interventions   Medication Review/Management medications reviewed;high risk medications identified   Pain/Comfort/Sleep Interventions   Pain Management Interventions awakened for pain meds per patient request;pain management plan reviewed with patient/caregiver       Intervention: Support/Optimize Psychosocial Response to Liver Disease    01/20/17 0500   Coping/Psychosocial Interventions   Supportive Measures active listening utilized;self-reflection promoted;relaxation techniques promoted   Psychosocial Support   Diversional Activities smartphone;television   Family/Support System Care involvement promoted       Intervention: Prevent/Manage Infection    01/10/17 1500 01/20/17 0500   Safety Interventions   Infection Prevention --  hydration promoted;environmental surveillance   Infection Management --  aseptic technique maintained   Prevent/Manage Colorectal Surgical Infection   Fever Reduction/Comfort Measures --  lightweight bedding;lightweight clothing   Hygiene Care   Oral Care mouth wash rinse;oral care provided;teeth brushed --        Intervention: Manage Hepatic Encephalopathy    01/20/17 0500   Neurological Interventions   Hepatic  Encephalopathy Management airway protection maintained       Intervention: Manage Bleeding Risk    01/19/17 0049   Safety Interventions   Bleeding Management dressing monitored   Bleeding Precautions monitored for signs of bleeding       Intervention: Optimize/Manage Nutrition    01/20/17 0500   Nutrition Interventions   Oral Nutrition Promotion calorie dense foods provided;calorie dense liquids provided;rest periods promoted   Coping/Psychosocial Interventions   Environmental Support calm environment promoted       Intervention: Monitor/Manage Fluid and Electrolyte Balance    01/20/17 0500   Nutrition Interventions   Fluid/Electrolyte Management fluids provided         Goal: Signs and Symptoms of Listed Potential Problems Will be Absent, Minimized or Managed (Liver Failure, Acute/Chronic)  Signs and symptoms of listed potential problems will be absent, minimized or managed by discharge/transition of care (reference Liver Failure, Acute/Chronic (Adult) CPG).   Outcome: Ongoing (interventions implemented as appropriate)    01/19/17 0049 01/20/17 1032   Liver Failure, Acute/Chronic   Problems Assessed (Liver Failure, Acute/Chronic) all --    Problems Present (Liver Failure, Acute/Chronic) --  fluid/electrolyte imbalance

## 2017-01-20 NOTE — NURSING
Received report from Lory Haro RN. Patient alert and awake sitting in bed. Patient states no needs at this time. Communication board up to date, bed in lowest position, and call bell in reach.

## 2017-01-20 NOTE — PLAN OF CARE
Problem: Pressure Ulcer Risk (Gerhard Scale) (Adult,Obstetrics,Pediatric)  Intervention: Promote/Optimize Nutrition    01/20/17 0500   Nutrition Interventions   Oral Nutrition Promotion calorie dense foods provided;calorie dense liquids provided;rest periods promoted       Intervention: Prevent/Manage Excess Moisture    01/17/17 2000 01/19/17 0049   Hygiene Care   Perineal Care diaper changed --    Bathing/Skin Care --  linen changed   Skin Interventions   Skin Protection --  adhesive use limited;tubing/devices free from skin contact       Intervention: Maintain Head of Bed Elevation Less Than 30 Degrees as Tolerated    01/20/17 1002   Positioning   Head of Bed (HOB) HOB at 30 degrees       Intervention: Prevent/Minimize Sheer/Friction Injuries    01/19/17 0049   Positioning   Positioning/Transfer Devices pillows   Skin Interventions   Pressure Reduction Devices pressure-redistributing mattress utilized   Pressure Reduction Techniques rest period provided between sit times;frequent weight shift encouraged       Intervention: Turn/Reposition Often    01/19/17 0049 01/20/17 1002   Positioning   Body Position --  positioned/repositioned independently   Skin Interventions   Pressure Reduction Techniques rest period provided between sit times;frequent weight shift encouraged --          Goal: Identify Related Risk Factors and Signs and Symptoms  Related risk factors and signs and symptoms are identified upon initiation of Human Response Clinical Practice Guideline (CPG)   Outcome: Ongoing (interventions implemented as appropriate)    01/19/17 0049   Pressure Ulcer Risk (Gerhard Scale)   Related Risk Factors (Pressure Ulcer Risk (Gerhard Scale)) excretions/secretions;fluid intake inadequate;mobility impaired       Goal: Skin Integrity  Patient will demonstrate the desired outcomes by discharge/transition of care.   Outcome: Ongoing (interventions implemented as appropriate)    01/19/17 1841   Pressure Ulcer Risk (Gerhard  Scale) (Adult,Obstetrics,Pediatric)   Skin Integrity making progress toward outcome

## 2017-01-20 NOTE — PROGRESS NOTES
[facility] Hospital Medicine  Progress Note    Patient Name: Tana Brewster  MRN: [id number]  Patient Class: IP- Inpatient   Admission Date: 1/8/2017  Length of Stay: 12 days  Attending Physician: Augie Jackson MD  Primary Care Provider: Pablo Browne MD        Subjective:     Principal Problem:Generalized weakness    HPI:  Tana Brewster is a 61 y.o. female that  has a past medical history of Crohn disease and Hypertension. Presents to [facility] Department complaining of generalized weakness.  She has a long-standing history of chronic alcoholism.  She states she has had subacute onset of generalized weakness that is progressively worsened over the last week.  Her symptoms are constant.  She thinks they are possibly exacerbated by decreased appetite and oral intake.  Associated symptoms include ataxia and bilateral lower extremity weakness.  Associated symptoms also include intermittent dysuria and she feels like she has a urinary tract infection.  She denies abdominal pain but does have some suprapubic discomfort.  She had a normal bowel movement this morning and denies melena, hematochezia, hematemesis, nausea, vomiting, chest pain, shortness of breath, syncope, vertigo, or tremors, fever, chills, or skin lesions or infections.    In the emergency department routine laboratory studies were obtained which demonstrated multiple abnormalities including evidence of urinary tract infection and electrolyte abnormalities.      Hospital medicine has been asked to admit for further evaluation and treatment.         Hospital Course:  62 y/o with cirrhosis due to ETOH abuse who was admitted for encephalopathy, UTI and electrolyte abnormalities. Pt was treated with Rocephin for UTI and had ascites evaluated/treated with paracentesis, and mentation cleared. Pt had persistent hypokalemia now improved. Pt with marked physical debility and deconditioning requiring therapy. The  recommendation was for rehab. The patient was to be transferred to [facility] rehab but this was denied. The patient will be going to [place] [facility]. The patient's E-coli UTI was treated.  The rest of the hospital course was unremarkable. Activity will be tolerated as per PT/OT  Diet- low NA. Follow up with PCP in one week and GI with Dr. Stevens in 2 weeks.      Interval History:     Review of Systems   Constitutional: Negative for activity change.   HENT: Negative for congestion.    Respiratory: Negative for chest tightness and shortness of breath.    Cardiovascular: Negative for chest pain.   Gastrointestinal: Positive for abdominal distention.     Objective:     Vital Signs (Most Recent):  Temp: 99.5 °F (37.5 °C) (01/20/17 0753)  Pulse: 89 (01/20/17 0753)  Resp: 18 (01/20/17 0753)  BP: 130/61 (01/20/17 0753)  SpO2: (!) 93 % (01/19/17 2000) Vital Signs (24h Range):  Temp:  [96.4 °F (35.8 °C)-99.5 °F (37.5 °C)] 99.5 °F (37.5 °C)  Pulse:  [80-91] 89  Resp:  [16-20] 18  SpO2:  [93 %-97 %] 93 %  BP: (119-139)/(56-74) 130/61     Weight: 82.1 kg (181 lb)  Body mass index is 33.11 kg/(m^2).    Intake/Output Summary (Last 24 hours) at 01/20/17 0948  Last data filed at 01/20/17 0500   Gross per 24 hour   Intake                0 ml   Output                0 ml   Net                0 ml      Physical Exam   Constitutional: She appears well-developed and well-nourished.   Eyes: Pupils are equal, round, and reactive to light.   Cardiovascular: Normal rate.    Pulmonary/Chest: Effort normal.   Abdominal: Soft.   Neurological: She is alert.       Significant Labs:   BMP:   Recent Labs  Lab 01/20/17  0441   GLU 85      K 3.4*      CO2 20*   BUN 45*   CREATININE 1.3   CALCIUM 8.6*   MG 1.8     CBC: No results for input(s): WBC, HGB, HCT, PLT in the last 48 hours.    Significant Imaging:     Assessment/Plan:      * Generalized weakness  Multifactorial due to ARF, UTI, chronic alcoholism, malnutrition with resultant  deconditioning, debility, functional impaired mobility and balance. Treated underlying conditions with steady improvement, and continue therapy with PT/OT. Pt making good progress and is motivated, excellent rehab candidate. Patient was to go to [facility] yesterday but insurance denied but will pay for SNF. Discussed with  and patient will likely be discharged to SNF- when arranged.       Ascites due to alcoholic cirrhosis  Paracentesis by IR on 1/9 with >4 L removed. Improved. Pt's abdomen is chronically distended due to chronic ascites that persists. She does not have any abdominal pain and has multiple bowel movements that is expected due to lactulose. Stable.      Chronic alcohol abuse  Pt reports she has stopped drinking for the past 2 months. Continued sobriety encouraged. No signs of withdrawal and she is out of window for withdrawal. Supplement with thiamine and folic acid. Stable. Noted Elevated LFT's.     Depression  On Zoloft.     VTE Risk Mitigation         Ordered     Medium Risk of VTE  Once      01/08/17 2145     Place sequential compression device  Until discontinued      01/08/17 2145     Place SARAH hose  Until discontinued      01/08/17 2145        Will discharge to SNF when arranged.      Augie Corbin MD  Department of Hospital Medicine   [facility]

## 2017-01-21 PROBLEM — R79.89 ELEVATED LFTS: Status: ACTIVE | Noted: 2017-01-21

## 2017-01-21 PROBLEM — E87.6 HYPOKALEMIA: Status: ACTIVE | Noted: 2017-01-21

## 2017-01-21 PROBLEM — N18.30 CKD (CHRONIC KIDNEY DISEASE), STAGE III: Status: ACTIVE | Noted: 2017-01-21

## 2017-01-21 PROBLEM — D63.8 ANEMIA OF OTHER CHRONIC DISEASE: Status: ACTIVE | Noted: 2017-01-21

## 2017-01-21 PROBLEM — E44.0 MALNUTRITION OF MODERATE DEGREE: Status: ACTIVE | Noted: 2017-01-21

## 2017-01-21 PROCEDURE — 25000003 PHARM REV CODE 250: Performed by: INTERNAL MEDICINE

## 2017-01-21 PROCEDURE — G8978 MOBILITY CURRENT STATUS: HCPCS | Mod: CJ

## 2017-01-21 PROCEDURE — 25000003 PHARM REV CODE 250: Performed by: HOSPITALIST

## 2017-01-21 PROCEDURE — 25000003 PHARM REV CODE 250: Performed by: EMERGENCY MEDICINE

## 2017-01-21 PROCEDURE — 21400001 HC TELEMETRY ROOM

## 2017-01-21 PROCEDURE — 97116 GAIT TRAINING THERAPY: CPT

## 2017-01-21 PROCEDURE — G8979 MOBILITY GOAL STATUS: HCPCS | Mod: CI

## 2017-01-21 PROCEDURE — G8980 MOBILITY D/C STATUS: HCPCS | Mod: CJ

## 2017-01-21 PROCEDURE — 97110 THERAPEUTIC EXERCISES: CPT

## 2017-01-21 RX ADMIN — FOLIC ACID 1 MG: 1 TABLET ORAL at 08:01

## 2017-01-21 RX ADMIN — Medication 3 ML: at 02:01

## 2017-01-21 RX ADMIN — Medication 3 ML: at 05:01

## 2017-01-21 RX ADMIN — RIFAXIMIN 550 MG: 550 TABLET ORAL at 09:01

## 2017-01-21 RX ADMIN — LORAZEPAM 1 MG: 0.5 TABLET ORAL at 02:01

## 2017-01-21 RX ADMIN — CIPROFLOXACIN HYDROCHLORIDE 250 MG: 250 TABLET, FILM COATED ORAL at 08:01

## 2017-01-21 RX ADMIN — RIFAXIMIN 550 MG: 550 TABLET ORAL at 08:01

## 2017-01-21 RX ADMIN — LORAZEPAM 1 MG: 0.5 TABLET ORAL at 10:01

## 2017-01-21 RX ADMIN — LACTULOSE 30 G: 10 SOLUTION ORAL at 02:01

## 2017-01-21 RX ADMIN — CIPROFLOXACIN HYDROCHLORIDE 250 MG: 250 TABLET, FILM COATED ORAL at 09:01

## 2017-01-21 RX ADMIN — ESCITALOPRAM OXALATE 10 MG: 10 TABLET ORAL at 08:01

## 2017-01-21 RX ADMIN — Medication 3 ML: at 09:01

## 2017-01-21 NOTE — PROGRESS NOTES
[facility] Hospital Medicine  Progress Note    Patient Name: Tana Brewster  MRN: [id number]  Patient Class: IP- Inpatient   Admission Date: 1/8/2017  Length of Stay: 13 days  Attending Physician: Augie Jackson MD  Primary Care Provider: Pablo Browne MD        Subjective:     Principal Problem:Generalized weakness    HPI:  Tana Brewster is a 61 y.o. female that  has a past medical history of Crohn disease and Hypertension. Presents to [facility] complaining of generalized weakness.  She has a long-standing history of chronic alcoholism.  She states she has had subacute onset of generalized weakness that is progressively worsened over the last week.  Her symptoms are constant.  She thinks they are possibly exacerbated by decreased appetite and oral intake.  Associated symptoms include ataxia and bilateral lower extremity weakness.  Associated symptoms also include intermittent dysuria and she feels like she has a urinary tract infection.  She denies abdominal pain but does have some suprapubic discomfort.  She had a normal bowel movement this morning and denies melena, hematochezia, hematemesis, nausea, vomiting, chest pain, shortness of breath, syncope, vertigo, or tremors, fever, chills, or skin lesions or infections.    In the emergency department routine laboratory studies were obtained which demonstrated multiple abnormalities including evidence of urinary tract infection and electrolyte abnormalities.      Hospital medicine has been asked to admit for further evaluation and treatment.         Hospital Course:  62 y/o with cirrhosis due to ETOH abuse who was admitted for encephalopathy, UTI and electrolyte abnormalities. Pt was treated with Rocephin for UTI and had ascites evaluated/treated with paracentesis, and mentation cleared. Pt had persistent hypokalemia now improved. Pt with marked physical debility and deconditioning requiring therapy. The  recommendation was for rehab. The patient was to be transferred to [facility] rehab but this was denied. The patient will be going to [facility]. The patient's E-coli UTI was treated.  The rest of the hospital course was unremarkable. Activity will be tolerated as per PT/OT  Diet- low NA. Follow up with PCP in one week and GI with Dr. Stevens in 2 weeks.      Interval History:  No new issues     Review of Systems   Constitutional: Negative for activity change.   HENT: Negative for congestion.    Respiratory: Negative for chest tightness and shortness of breath.    Cardiovascular: Negative for chest pain.   Gastrointestinal: Positive for abdominal distention.     Objective:     Vital Signs (Most Recent):  Temp: 98.4 °F (36.9 °C) (01/21/17 0808)  Pulse: 86 (01/21/17 0808)  Resp: 19 (01/21/17 0808)  BP: (!) 119/58 (01/21/17 0808)  SpO2: (!) 93 % (01/21/17 0808) Vital Signs (24h Range):  Temp:  [98 °F (36.7 °C)-98.9 °F (37.2 °C)] 98.4 °F (36.9 °C)  Pulse:  [83-94] 86  Resp:  [18-19] 19  SpO2:  [91 %-94 %] 93 %  BP: (108-138)/(51-60) 119/58     Weight: 82 kg (180 lb 12.4 oz)  Body mass index is 33.06 kg/(m^2).    Intake/Output Summary (Last 24 hours) at 01/21/17 1045  Last data filed at 01/21/17 0900   Gross per 24 hour   Intake              720 ml   Output                2 ml   Net              718 ml      Physical Exam   Constitutional: She appears well-developed and well-nourished.   Eyes: Pupils are equal, round, and reactive to light.   Cardiovascular: Normal rate.    Pulmonary/Chest: Effort normal.   Abdominal: Soft.   Neurological: She is alert.       Significant Labs:   BMP:   Recent Labs  Lab 01/20/17  0441   GLU 85      K 3.4*      CO2 20*   BUN 45*   CREATININE 1.3   CALCIUM 8.6*   MG 1.8     CBC: No results for input(s): WBC, HGB, HCT, PLT in the last 48 hours.    Significant Imaging:     Assessment/Plan:      * Generalized weakness  Multifactorial due to ARF, UTI, chronic alcoholism, malnutrition  with resultant deconditioning, debility, functional impaired mobility and balance. Treated underlying conditions with steady improvement, and continue therapy with PT/OT. Pt making good progress and is motivated, excellent rehab candidate. Patient was to go to [facility] yesterday but insurance denied but will pay for SNF. Discussed with  and patient will likely be discharged to SNF- when arranged.       Ascites due to alcoholic cirrhosis  Paracentesis by IR on 1/9 with >4 L removed. Improved. Pt's abdomen is chronically distended due to chronic ascites that persists. She does not have any abdominal pain and has multiple bowel movements that is expected due to lactulose. Stable.      Chronic alcohol abuse  Pt reports she has stopped drinking for the past 2 months. Continued sobriety encouraged. No signs of withdrawal and she is out of window for withdrawal. Supplement with thiamine and folic acid. Stable. Noted Elevated LFT's.     Depression  On Zoloft.     Anemia of other chronic disease  Stable.       Hypokalemia  Will check today       Malnutrition of moderate degree  Stable.       CKD (chronic kidney disease), stage III  At baseline       Elevated LFTs  Stable.     VTE Risk Mitigation         Ordered     Medium Risk of VTE  Once      01/08/17 2145     Place sequential compression device  Until discontinued      01/08/17 2145     Place SARAH hose  Until discontinued      01/08/17 2145        To SNF when arranged. Patient will be here over weekend.     Augie Corbin MD  Department of Hospital Medicine   [facility]

## 2017-01-21 NOTE — PLAN OF CARE
Problem: Fall Risk (Adult)  Goal: Absence of Falls  Patient will demonstrate the desired outcomes by discharge/transition of care.   Outcome: Ongoing (interventions implemented as appropriate)    01/21/17 0954   Fall Risk (Adult)   Absence of Falls making progress toward outcome         Problem: Patient Care Overview  Goal: Plan of Care Review  Outcome: Ongoing (interventions implemented as appropriate)    01/21/17 0954   Coping/Psychosocial   Plan Of Care Reviewed With patient         Problem: Pressure Ulcer Risk (Gerhard Scale) (Adult,Obstetrics,Pediatric)  Goal: Skin Integrity  Patient will demonstrate the desired outcomes by discharge/transition of care.   Outcome: Ongoing (interventions implemented as appropriate)    01/21/17 0954   Pressure Ulcer Risk (Gerhard Scale) (Adult,Obstetrics,Pediatric)   Skin Integrity making progress toward outcome         Problem: Liver Failure, Acute/Chronic (Adult)  Goal: Signs and Symptoms of Listed Potential Problems Will be Absent, Minimized or Managed (Liver Failure, Acute/Chronic)  Signs and symptoms of listed potential problems will be absent, minimized or managed by discharge/transition of care (reference Liver Failure, Acute/Chronic (Adult) CPG).   Outcome: Ongoing (interventions implemented as appropriate)    01/21/17 0954   Liver Failure, Acute/Chronic   Problems Assessed (Liver Failure, Acute/Chronic) all   Problems Present (Liver Failure, Acute/Chronic) fluid/electrolyte imbalance;situational response

## 2017-01-21 NOTE — PROGRESS NOTES
12 hour chart check complete. Report given to oncoming nurse. No acute distress noted at this time.

## 2017-01-21 NOTE — PLAN OF CARE
Problem: Fall Risk (Adult)  Goal: Absence of Falls  Patient will demonstrate the desired outcomes by discharge/transition of care.   Outcome: Ongoing (interventions implemented as appropriate)    01/21/17 0434   Fall Risk (Adult)   Absence of Falls making progress toward outcome         Problem: Patient Care Overview  Goal: Plan of Care Review  Outcome: Ongoing (interventions implemented as appropriate)    01/21/17 0434   Coping/Psychosocial   Plan Of Care Reviewed With patient         Problem: Pressure Ulcer Risk (Gerhard Scale) (Adult,Obstetrics,Pediatric)  Goal: Skin Integrity  Patient will demonstrate the desired outcomes by discharge/transition of care.   Outcome: Ongoing (interventions implemented as appropriate)    01/21/17 0434   Pressure Ulcer Risk (Gerhard Scale) (Adult,Obstetrics,Pediatric)   Skin Integrity making progress toward outcome         Problem: Liver Failure, Acute/Chronic (Adult)  Goal: Signs and Symptoms of Listed Potential Problems Will be Absent, Minimized or Managed (Liver Failure, Acute/Chronic)  Signs and symptoms of listed potential problems will be absent, minimized or managed by discharge/transition of care (reference Liver Failure, Acute/Chronic (Adult) CPG).   Outcome: Ongoing (interventions implemented as appropriate)    01/21/17 0434   Liver Failure, Acute/Chronic   Problems Assessed (Liver Failure, Acute/Chronic) fluid/electrolyte imbalance   Problems Present (Liver Failure, Acute/Chronic) fluid/electrolyte imbalance

## 2017-01-21 NOTE — PROGRESS NOTES
Report received from CINDY Beach.   Visualized patient and assessed patient's overall condition and appearance. No complaints. NAD noted. Will continue to monitor.

## 2017-01-21 NOTE — PT/OT/SLP PROGRESS
Physical Therapy  Treatment    Tanajayshree Brewster   MRN: [id number]   Admitting Diagnosis: Generalized weakness    PT Received On: 17  PT Start Time: 1145     PT Stop Time: 1210    PT Total Time (min): 25 min       Billable Minutes:  Gait Training8 and Therapeutic Exercise 17    Treatment Type: Treatment  PT/PTA: PTA     PTA Visit Number: 3       General Precautions: Standard, fall  Orthopedic Precautions: N/A   Braces: N/A         Subjective:  Communicated with nsg prior to session.  Pt had c/o being dizzy with sitting and standing. Pt agreed to walk but not far    Pain Ratin/10                   Objective:   Patient found with: telemetry    Functional Mobility:  Bed Mobility:   Rolling/Turning to Left: Supervision  Rolling/Turning Right: Supervision  Scooting/Bridging: Supervision  Supine to Sit: Supervision  Sit to Supine: Supervision    Transfers:  Sit <> Stand Assistance: Contact Guard Assistance  Sit <> Stand Assistive Device: Rolling Walker  Bed <> Chair Technique: Stand Pivot    Gait:   Gait Distance: 4-5 step bed>chair then 20 ft [facility] with slow maverick  Assistance 1: Contact Guard Assistance  Gait Assistive Device: Rolling walker  Gait Pattern: 3-point gait  Gait Deviation(s): decreased maverick, increased time in double stance, decreased velocity of limb motion, decreased step length, decreased stride length    Stairs:      Balance:   Static Sit: GOOD: Takes MODERATE challenges from all directions  Dynamic Sit: GOOD: Maintains balance through MODERATE excursions of active trunk movement  Static Stand: FAIR+: Takes MINIMAL challenges from all directions  Dynamic stand: FAIR: Needs CONTACT GUARD during gait     Therapeutic Activities and Exercises:  BLE TE seated x 20; LAQ, HIP FLEX, PILLOW SQUEEZES, HR, TT, GS     AM-PAC 6 CLICK MOBILITY  How much help from another person does this patient currently need?   1 = Unable, Total/Dependent Assistance  2 = A lot, Maximum/Moderate Assistance  3 = A little,  Minimum/Contact Guard/Supervision  4 = None, Modified [place]/Independent    Turning over in bed (including adjusting bedclothes, sheets and blankets)?: 4  Sitting down on and standing up from a chair with arms (e.g., wheelchair, bedside commode, etc.): 3  Moving from lying on back to sitting on the side of the bed?: 4  Moving to and from a bed to a chair (including a wheelchair)?: 3  Need to walk in hospital room?: 3  Climbing 3-5 steps with a railing?: 3  Total Score: 20    AM-PAC Raw Score CMS G-Code Modifier Level of Impairment Assistance   6 % Total / Unable   7 - 9 CM 80 - 100% Maximal Assist   10 - 14 CL 60 - 80% Moderate Assist   15 - 19 CK 40 - 60% Moderate Assist   20 - 22 CJ 20 - 40% Minimal Assist   23 CI 1-20% SBA / CGA   24 CH 0% Independent/ Mod I     Patient left up in chair with call button in reach and nsg notified.    Assessment:  Tana Brewster is a 61 y.o. female with a medical diagnosis of Generalized weakness and presents with weakness, poor endurance, and c/o dizziness with sitting and standing.  Pt celine tx fairly well with rest..    Rehab identified problem list/impairments: Rehab identified problem list/impairments: weakness, impaired functional mobilty, impaired cardiopulmonary response to activity, impaired endurance, gait instability, impaired balance, decreased lower extremity function    Rehab potential is good.    Activity tolerance: Fair    Discharge recommendations: Discharge Facility/Level Of Care Needs: nursing facility, skilled     Barriers to discharge:      Equipment recommendations: Equipment Needed After Discharge: none     GOALS:   Physical Therapy Goals        Problem: Physical Therapy Goal    Goal Priority Disciplines Outcome Goal Variances Interventions   Physical Therapy Goal     PT/OT, PT Ongoing (interventions implemented as appropriate)     Description:  Goals to be met by: 1/23/17    Patient will increase functional independence with mobility by  performin. Supine to sit with supervision  2. Sit to stand transfer supervision with RW  3. Gait 50ft with RW and supervision  4. Lower extremity exercise program x30 reps per handout, with supervision                 PLAN:    Patient to be seen 6 x/week (saturday or  )  to address the above listed problems via gait training, therapeutic activities, therapeutic exercises  Plan of Care expires: 17  Plan of Care reviewed with: patient         Leland Sotelohanh, PTA  2017

## 2017-01-21 NOTE — ASSESSMENT & PLAN NOTE
Multifactorial due to ARF, UTI, chronic alcoholism, malnutrition with resultant deconditioning, debility, functional impaired mobility and balance. Treated underlying conditions with steady improvement, and continue therapy with PT/OT. Pt making good progress and is motivated, excellent rehab candidate. Patient was to go to [facility] yesterday but insurance denied but will pay for SNF. Discussed with  and patient will likely be discharged to SNF- when arranged.

## 2017-01-21 NOTE — SUBJECTIVE & OBJECTIVE
Interval History:  No new issues     Review of Systems   Constitutional: Negative for activity change.   HENT: Negative for congestion.    Respiratory: Negative for chest tightness and shortness of breath.    Cardiovascular: Negative for chest pain.   Gastrointestinal: Positive for abdominal distention.     Objective:     Vital Signs (Most Recent):  Temp: 98.4 °F (36.9 °C) (01/21/17 0808)  Pulse: 86 (01/21/17 0808)  Resp: 19 (01/21/17 0808)  BP: (!) 119/58 (01/21/17 0808)  SpO2: (!) 93 % (01/21/17 0808) Vital Signs (24h Range):  Temp:  [98 °F (36.7 °C)-98.9 °F (37.2 °C)] 98.4 °F (36.9 °C)  Pulse:  [83-94] 86  Resp:  [18-19] 19  SpO2:  [91 %-94 %] 93 %  BP: (108-138)/(51-60) 119/58     Weight: 82 kg (180 lb 12.4 oz)  Body mass index is 33.06 kg/(m^2).    Intake/Output Summary (Last 24 hours) at 01/21/17 1045  Last data filed at 01/21/17 0900   Gross per 24 hour   Intake              720 ml   Output                2 ml   Net              718 ml      Physical Exam   Constitutional: She appears well-developed and well-nourished.   Eyes: Pupils are equal, round, and reactive to light.   Cardiovascular: Normal rate.    Pulmonary/Chest: Effort normal.   Abdominal: Soft.   Neurological: She is alert.       Significant Labs:   BMP:   Recent Labs  Lab 01/20/17  0441   GLU 85      K 3.4*      CO2 20*   BUN 45*   CREATININE 1.3   CALCIUM 8.6*   MG 1.8     CBC: No results for input(s): WBC, HGB, HCT, PLT in the last 48 hours.    Significant Imaging:

## 2017-01-22 PROCEDURE — 25000003 PHARM REV CODE 250: Performed by: INTERNAL MEDICINE

## 2017-01-22 PROCEDURE — 25000003 PHARM REV CODE 250: Performed by: EMERGENCY MEDICINE

## 2017-01-22 PROCEDURE — 21400001 HC TELEMETRY ROOM

## 2017-01-22 PROCEDURE — 25000003 PHARM REV CODE 250: Performed by: HOSPITALIST

## 2017-01-22 RX ADMIN — Medication 3 ML: at 05:01

## 2017-01-22 RX ADMIN — FOLIC ACID 1 MG: 1 TABLET ORAL at 08:01

## 2017-01-22 RX ADMIN — LORAZEPAM 1 MG: 0.5 TABLET ORAL at 04:01

## 2017-01-22 RX ADMIN — Medication 3 ML: at 02:01

## 2017-01-22 RX ADMIN — ESCITALOPRAM OXALATE 10 MG: 10 TABLET ORAL at 08:01

## 2017-01-22 RX ADMIN — RIFAXIMIN 550 MG: 550 TABLET ORAL at 08:01

## 2017-01-22 RX ADMIN — CIPROFLOXACIN HYDROCHLORIDE 250 MG: 250 TABLET, FILM COATED ORAL at 08:01

## 2017-01-22 RX ADMIN — LORAZEPAM 1 MG: 0.5 TABLET ORAL at 08:01

## 2017-01-22 RX ADMIN — Medication 3 ML: at 10:01

## 2017-01-22 NOTE — PLAN OF CARE
Problem: Fall Risk (Adult)  Goal: Absence of Falls  Patient will demonstrate the desired outcomes by discharge/transition of care.   Outcome: Ongoing (interventions implemented as appropriate)    01/22/17 1431   Fall Risk (Adult)   Absence of Falls making progress toward outcome         Problem: Patient Care Overview  Goal: Plan of Care Review  Outcome: Ongoing (interventions implemented as appropriate)    01/22/17 1431   Coping/Psychosocial   Plan Of Care Reviewed With patient         Problem: Pressure Ulcer Risk (Gerhard Scale) (Adult,Obstetrics,Pediatric)  Goal: Skin Integrity  Patient will demonstrate the desired outcomes by discharge/transition of care.   Outcome: Ongoing (interventions implemented as appropriate)    01/22/17 1431   Pressure Ulcer Risk (Gerhard Scale) (Adult,Obstetrics,Pediatric)   Skin Integrity making progress toward outcome         Problem: Liver Failure, Acute/Chronic (Adult)  Goal: Signs and Symptoms of Listed Potential Problems Will be Absent, Minimized or Managed (Liver Failure, Acute/Chronic)  Signs and symptoms of listed potential problems will be absent, minimized or managed by discharge/transition of care (reference Liver Failure, Acute/Chronic (Adult) CPG).   Outcome: Ongoing (interventions implemented as appropriate)    01/22/17 1431   Liver Failure, Acute/Chronic   Problems Assessed (Liver Failure, Acute/Chronic) all   Problems Present (Liver Failure, Acute/Chronic) fluid/electrolyte imbalance;situational response

## 2017-01-22 NOTE — PROGRESS NOTES
Report given to CINDY Beach. Walking rounds completed. Visualized and assessed patient NAD noted. Safety precautions maintained and call light within reach.

## 2017-01-22 NOTE — SUBJECTIVE & OBJECTIVE
Interval History:  No new issues     Review of Systems   Constitutional: Negative for activity change.   HENT: Negative for congestion.    Respiratory: Negative for chest tightness and shortness of breath.    Cardiovascular: Negative for chest pain.   Gastrointestinal: Positive for abdominal distention.     Objective:     Vital Signs (Most Recent):  Temp: 98.3 °F (36.8 °C) (01/22/17 0812)  Pulse: 85 (01/22/17 0812)  Resp: 20 (01/22/17 0812)  BP: (!) 105/52 (01/22/17 0812)  SpO2: (!) 94 % (01/22/17 0812) Vital Signs (24h Range):  Temp:  [97.7 °F (36.5 °C)-98.4 °F (36.9 °C)] 98.3 °F (36.8 °C)  Pulse:  [85-97] 85  Resp:  [17-20] 20  SpO2:  [93 %-95 %] 94 %  BP: (101-125)/(52-60) 105/52     Weight: 82 kg (180 lb 12.4 oz)  Body mass index is 33.06 kg/(m^2).    Intake/Output Summary (Last 24 hours) at 01/22/17 0916  Last data filed at 01/22/17 0800   Gross per 24 hour   Intake             1160 ml   Output                0 ml   Net             1160 ml      Physical Exam   Constitutional: She appears well-developed and well-nourished.   Eyes: Pupils are equal, round, and reactive to light.   Cardiovascular: Normal rate.    Pulmonary/Chest: Effort normal.   Abdominal: Soft.   Neurological: She is alert.       Significant Labs: BMP: No results for input(s): GLU, NA, K, CL, CO2, BUN, CREATININE, CALCIUM, MG in the last 48 hours.  CBC: No results for input(s): WBC, HGB, HCT, PLT in the last 48 hours.    Significant Imaging:

## 2017-01-22 NOTE — PROGRESS NOTES
[facility] Hospital Medicine  Progress Note    Patient Name: Tana Brewster  MRN: [id number]  Patient Class: IP- Inpatient   Admission Date: 1/8/2017  Length of Stay: 14 days  Attending Physician: Augie Jackson MD  Primary Care Provider: Pablo Browne MD        Subjective:     Principal Problem:Generalized weakness    HPI:  Tana Brewster is a 61 y.o. female that  has a past medical history of Crohn disease and Hypertension. Presents to [facility] Emergency Department complaining of generalized weakness.  She has a long-standing history of chronic alcoholism.  She states she has had subacute onset of generalized weakness that is progressively worsened over the last week.  Her symptoms are constant.  She thinks they are possibly exacerbated by decreased appetite and oral intake.  Associated symptoms include ataxia and bilateral lower extremity weakness.  Associated symptoms also include intermittent dysuria and she feels like she has a urinary tract infection.  She denies abdominal pain but does have some suprapubic discomfort.  She had a normal bowel movement this morning and denies melena, hematochezia, hematemesis, nausea, vomiting, chest pain, shortness of breath, syncope, vertigo, or tremors, fever, chills, or skin lesions or infections.    In the emergency department routine laboratory studies were obtained which demonstrated multiple abnormalities including evidence of urinary tract infection and electrolyte abnormalities.      Hospital medicine has been asked to admit for further evaluation and treatment.         Hospital Course:  62 y/o with cirrhosis due to ETOH abuse who was admitted for encephalopathy, UTI and electrolyte abnormalities. Pt was treated with Rocephin for UTI and had ascites evaluated/treated with paracentesis, and mentation cleared. Pt had persistent hypokalemia now improved. Pt with marked physical debility and deconditioning requiring therapy. The  recommendation was for rehab. The patient was to be transferred to [facility] rehab but this was denied. The patient will be going to [facility]. The patient's E-coli UTI was treated.  The rest of the hospital course was unremarkable. Activity will be tolerated as per PT/OT  Diet- low NA. Follow up with PCP in one week and GI with Dr. Stevens in 2 weeks.      Interval History:  No new issues     Review of Systems   Constitutional: Negative for activity change.   HENT: Negative for congestion.    Respiratory: Negative for chest tightness and shortness of breath.    Cardiovascular: Negative for chest pain.   Gastrointestinal: Positive for abdominal distention.     Objective:     Vital Signs (Most Recent):  Temp: 98.3 °F (36.8 °C) (01/22/17 0812)  Pulse: 85 (01/22/17 0812)  Resp: 20 (01/22/17 0812)  BP: (!) 105/52 (01/22/17 0812)  SpO2: (!) 94 % (01/22/17 0812) Vital Signs (24h Range):  Temp:  [97.7 °F (36.5 °C)-98.4 °F (36.9 °C)] 98.3 °F (36.8 °C)  Pulse:  [85-97] 85  Resp:  [17-20] 20  SpO2:  [93 %-95 %] 94 %  BP: (101-125)/(52-60) 105/52     Weight: 82 kg (180 lb 12.4 oz)  Body mass index is 33.06 kg/(m^2).    Intake/Output Summary (Last 24 hours) at 01/22/17 0916  Last data filed at 01/22/17 0800   Gross per 24 hour   Intake             1160 ml   Output                0 ml   Net             1160 ml      Physical Exam   Constitutional: She appears well-developed and well-nourished.   Eyes: Pupils are equal, round, and reactive to light.   Cardiovascular: Normal rate.    Pulmonary/Chest: Effort normal.   Abdominal: Soft.   Neurological: She is alert.       Significant Labs: BMP: No results for input(s): GLU, NA, K, CL, CO2, BUN, CREATININE, CALCIUM, MG in the last 48 hours.  CBC: No results for input(s): WBC, HGB, HCT, PLT in the last 48 hours.    Significant Imaging:    Assessment/Plan:      * Generalized weakness  Multifactorial due to ARF, UTI, chronic alcoholism, malnutrition with resultant deconditioning,  debility, functional impaired mobility and balance. Treated underlying conditions with steady improvement, and continue therapy with PT/OT. Pt making good progress and is motivated, excellent rehab candidate. Patient was to go to [facility] yesterday but insurance denied but will pay for SNF. Discussed with  and patient will likely be discharged to SNF- when arranged.       Ascites due to alcoholic cirrhosis  Paracentesis by IR on 1/9 with >4 L removed. Improved. Pt's abdomen is chronically distended due to chronic ascites that persists. She does not have any abdominal pain and has multiple bowel movements that is expected due to lactulose. Stable.      Chronic alcohol abuse  Pt reports she has stopped drinking for the past 2 months. Continued sobriety encouraged. No signs of withdrawal and she is out of window for withdrawal. Supplement with thiamine and folic acid. Stable. Noted Elevated LFT's.     Depression  On Zoloft.     Anemia of other chronic disease  Stable.       Hypokalemia  Will check  In am.       Malnutrition of moderate degree  Stable.       CKD (chronic kidney disease), stage III  At baseline       Elevated LFTs  Stable- will check in am.     VTE Risk Mitigation         Ordered     Medium Risk of VTE  Once      01/08/17 2145     Place sequential compression device  Until discontinued      01/08/17 2145     Place SARAH hose  Until discontinued      01/08/17 2145        Awaiting SNF placement. Will discuss dispo with  in am.     Augie Corbin MD  Department of Hospital Medicine   [facility]

## 2017-01-22 NOTE — ASSESSMENT & PLAN NOTE
Multifactorial due to ARF, UTI, chronic alcoholism, malnutrition with resultant deconditioning, debility, functional impaired mobility and balance. Treated underlying conditions with steady improvement, and continue therapy with PT/OT. Pt making good progress and is motivated, excellent rehab candidate. Patient was to go to [facility] yesterday but insurance denied but will pay for SNF. Discussed with  and patient will likely be discharged to SNF- when arranged.

## 2017-01-23 VITALS
TEMPERATURE: 99 F | RESPIRATION RATE: 18 BRPM | SYSTOLIC BLOOD PRESSURE: 105 MMHG | HEIGHT: 62 IN | OXYGEN SATURATION: 91 % | DIASTOLIC BLOOD PRESSURE: 51 MMHG | WEIGHT: 178.56 LBS | HEART RATE: 90 BPM | BODY MASS INDEX: 32.86 KG/M2

## 2017-01-23 PROBLEM — E87.6 HYPOKALEMIA: Status: RESOLVED | Noted: 2017-01-21 | Resolved: 2017-01-23

## 2017-01-23 LAB
ALBUMIN SERPL BCP-MCNC: 1.9 G/DL
ALP SERPL-CCNC: 371 U/L
ALT SERPL W/O P-5'-P-CCNC: 47 U/L
ANION GAP SERPL CALC-SCNC: 9 MMOL/L
AST SERPL-CCNC: 118 U/L
BILIRUB SERPL-MCNC: 2 MG/DL
BUN SERPL-MCNC: 38 MG/DL
CALCIUM SERPL-MCNC: 8.5 MG/DL
CHLORIDE SERPL-SCNC: 106 MMOL/L
CO2 SERPL-SCNC: 20 MMOL/L
CREAT SERPL-MCNC: 1.1 MG/DL
EST. GFR  (AFRICAN AMERICAN): >60 ML/MIN/1.73 M^2
EST. GFR  (NON AFRICAN AMERICAN): 55 ML/MIN/1.73 M^2
GLUCOSE SERPL-MCNC: 93 MG/DL
POTASSIUM SERPL-SCNC: 3.5 MMOL/L
PROT SERPL-MCNC: 6.6 G/DL
SODIUM SERPL-SCNC: 135 MMOL/L

## 2017-01-23 PROCEDURE — G8989 SELF CARE D/C STATUS: HCPCS | Mod: CL

## 2017-01-23 PROCEDURE — 80053 COMPREHEN METABOLIC PANEL: CPT

## 2017-01-23 PROCEDURE — 25000003 PHARM REV CODE 250: Performed by: HOSPITALIST

## 2017-01-23 PROCEDURE — G8987 SELF CARE CURRENT STATUS: HCPCS | Mod: CL

## 2017-01-23 PROCEDURE — G8988 SELF CARE GOAL STATUS: HCPCS | Mod: CK

## 2017-01-23 PROCEDURE — 86580 TB INTRADERMAL TEST: CPT | Performed by: INTERNAL MEDICINE

## 2017-01-23 PROCEDURE — 63600175 PHARM REV CODE 636 W HCPCS: Performed by: INTERNAL MEDICINE

## 2017-01-23 PROCEDURE — 25000003 PHARM REV CODE 250: Performed by: EMERGENCY MEDICINE

## 2017-01-23 PROCEDURE — 25000003 PHARM REV CODE 250: Performed by: INTERNAL MEDICINE

## 2017-01-23 PROCEDURE — 36415 COLL VENOUS BLD VENIPUNCTURE: CPT

## 2017-01-23 RX ADMIN — CIPROFLOXACIN HYDROCHLORIDE 250 MG: 250 TABLET, FILM COATED ORAL at 09:01

## 2017-01-23 RX ADMIN — Medication 5 UNITS: at 12:01

## 2017-01-23 RX ADMIN — LORAZEPAM 1 MG: 0.5 TABLET ORAL at 09:01

## 2017-01-23 RX ADMIN — Medication 3 ML: at 06:01

## 2017-01-23 RX ADMIN — FOLIC ACID 1 MG: 1 TABLET ORAL at 09:01

## 2017-01-23 RX ADMIN — ESCITALOPRAM OXALATE 10 MG: 10 TABLET ORAL at 09:01

## 2017-01-23 RX ADMIN — RIFAXIMIN 550 MG: 550 TABLET ORAL at 09:01

## 2017-01-23 NOTE — DISCHARGE SUMMARY
[facility] Hospital Medicine  Discharge Summary      Patient Name: Tana Brewster  MRN: [id number]  Admission Date: 1/8/2017  Hospital Length of Stay: 15 days  Discharge Date and Time: 1/23/2017 9:48 AM  Attending Physician: Augie Jackson MD   Discharging Provider: Augie Jackson MD  Primary Care Provider: Pablo Browne MD      HPI:   Tana Brewster is a 61 y.o. female that  has a past medical history of Crohn disease and Hypertension. Presents to [facility] Emergency Department complaining of generalized weakness.  She has a long-standing history of chronic alcoholism.  She states she has had subacute onset of generalized weakness that is progressively worsened over the last week.  Her symptoms are constant.  She thinks they are possibly exacerbated by decreased appetite and oral intake.  Associated symptoms include ataxia and bilateral lower extremity weakness.  Associated symptoms also include intermittent dysuria and she feels like she has a urinary tract infection.  She denies abdominal pain but does have some suprapubic discomfort.  She had a normal bowel movement this morning and denies melena, hematochezia, hematemesis, nausea, vomiting, chest pain, shortness of breath, syncope, vertigo, or tremors, fever, chills, or skin lesions or infections.    In the emergency department routine laboratory studies were obtained which demonstrated multiple abnormalities including evidence of urinary tract infection and electrolyte abnormalities.      Hospital medicine has been asked to admit for further evaluation and treatment.         * No surgery found *      Indwelling Lines/Drains at time of discharge:   Lines/Drains/Airways          No matching active lines, drains, or airways        Hospital Course:   62 y/o with cirrhosis due to ETOH abuse who was admitted for encephalopathy, UTI and electrolyte abnormalities. Pt was treated with Rocephin for UTI and had ascites  evaluated/treated with paracentesis, and mentation cleared. Pt had persistent hypokalemia now improved. Pt with marked physical debility and deconditioning requiring therapy. The recommendation was for rehab. The patient was to be transferred to [facility] rehab but this was denied. The patient will be going to [facility]. The patient's E-coli UTI was treated.  The rest of the hospital course was unremarkable. Activity will be tolerated as per PT/OT  Diet- low NA. Follow up with PCP in one week and GI with Dr. Stevens in 2 weeks.       Consults:   Consults         Status Ordering Provider     Inpatient consult to Interventional Radiology  Once     Specialty:  Interventional Radiology  Provider:  Anita Stacy MD    Completed DUKE GROVER     IP consult to case management  Once     Provider:  (Not yet assigned)    Completed CHE GAGE     IP consult to dietary  Once     Provider:  (Not yet assigned)    Completed CHE GAGE          Significant Diagnostic Studies:     Pending Diagnostic Studies:     Procedure Component Value Units Date/Time    Freeze and Hold, [facility] [[id number]] Collected:  01/09/17 0950    Order Status:  Sent Lab Status:  No result     Specimen:  Body Fluid from Ascites         Final Active Diagnoses:    Diagnosis Date Noted POA    PRINCIPAL PROBLEM:  Generalized weakness [R53.1] 01/08/2017 Yes    Anemia of other chronic disease [D63.8] 01/21/2017 Yes    Malnutrition of moderate degree [E44.0] 01/21/2017 Yes    CKD (chronic kidney disease), stage III [N18.3] 01/21/2017 Yes    Elevated LFTs [R94.5] 01/21/2017 Yes    Depression [F32.9] 01/15/2017 Yes    Ascites due to alcoholic cirrhosis [K70.31] 01/08/2017 Yes    Chronic alcohol abuse [F10.10] 01/08/2017 Yes      Problems Resolved During this Admission:    Diagnosis Date Noted Date Resolved POA    Hypokalemia [E87.6] 01/21/2017 01/23/2017 Yes    Acute on chronic renal failure [N17.9, N18.9] 01/08/2017 01/17/2017 Yes     Hypokalemia [E87.6] 01/08/2017 01/17/2017 Yes    Hypomagnesemia [E83.42] 01/08/2017 01/17/2017 Yes    Encephalopathy, metabolic [G93.41] 01/08/2017 01/17/2017 Yes    Acute cystitis without hematuria [N30.00] 01/08/2017 01/17/2017 Yes    Elevated troponin [R79.89] 01/08/2017 01/17/2017 Yes      No new Assessment & Plan notes have been filed under this hospital service since the last note was generated.  Service: Hospital Medicine      Discharged Condition: good    Disposition: Skilled Nursing Facility    Follow Up:  Follow-up Information     Follow up with Pablo Browne MD.    Specialty:  Internal Medicine    Contact information:    [address]  [address]  [place] [id number] [phone]          Follow up with Pablo Browne MD In 1 week.    Specialty:  Internal Medicine    Contact information:    [address]  [address]  [facility] [address] [phone]          Follow up with Clifford Stevens MD In 2 weeks.    Specialty:  Gastroenterology    Contact information:    [address] [place] [address]  [address]  [facility]  [place] [address] [phone]          Follow up with Pablo Browne MD In 1 week.    Specialty:  Internal Medicine    Contact information:    [address]  [address]  [facility] [address] [phone]          Patient Instructions:   No discharge procedures on file.  Medications:  Reconciled Home Medications:   Current Discharge Medication List      START taking these medications    Details   !! folic acid (FOLVITE) 1 MG tablet Take 1 tablet (1 mg total) by mouth once daily.  Refills: 0      lactulose (CHRONULAC) 20 gram/30 mL Soln Take 45 mLs (30 g total) by mouth 3 (three) times daily.  Qty: 1350 mL, Refills: 0      rifAXIMin (XIFAXAN) 550 mg Tab Take 1 tablet (550 mg total) by mouth 2 (two) times daily.       !! - Potential duplicate medications found. Please discuss with provider.      CONTINUE these medications which have NOT CHANGED     Details   atorvastatin (LIPITOR) 40 MG tablet Take 40 mg by mouth once daily.      esomeprazole (NEXIUM) 40 MG capsule Take 40 mg by mouth before breakfast.      !! folic acid (FOLVITE) 1 MG tablet Take 1 mg by mouth once daily.      hydrochlorothiazide (HYDRODIURIL) 25 MG tablet Take 25 mg by mouth once daily.      hydrOXYzine pamoate (VISTARIL) 25 MG Cap Take 25 mg by mouth 4 (four) times daily.      sertraline (ZOLOFT) 100 MG tablet Take 100 mg by mouth once daily.      trazodone (DESYREL) 50 MG tablet Take 50 mg by mouth every evening.       !! - Potential duplicate medications found. Please discuss with provider.        Time spent on the discharge of patient: > 30 minutes    Augie Corbin MD  Department of Hospital Medicine  [facility]

## 2017-01-23 NOTE — PROGRESS NOTES
Report given to CINDY Gonzalez. Walking rounds completed. Visualized and assessed patient NAD noted. Safety precautions maintained and call light within reach.

## 2017-01-23 NOTE — PT/OT/SLP DISCHARGE
Physical Therapy Discharge Summary    Tanajayshree Brewster  MRN: [id number]   Generalized weakness   Patient Discharged from acute Physical Therapy on 17.  Please refer to prior PT noted date on 17 for functional status.     Assessment:   Goals partially met. Patient appropriate for care in another setting.  GOALS:   Physical Therapy Goals        Problem: Physical Therapy Goal    Goal Priority Disciplines Outcome Goal Variances Interventions   Physical Therapy Goal     PT/OT, PT Unable to achieve outcome(s) by discharge     Description:  Goals to be met by: 17    Patient will increase functional independence with mobility by performin. Supine to sit with supervision  2. Sit to stand transfer supervision with RW  3. Gait 50ft with RW and supervision  4. Lower extremity exercise program x30 reps per handout, with supervision               Reasons for Discontinuation of Therapy Services  Transfer to alternate level of care.      Plan:  Patient Discharged to: Skilled Nursing Facility.

## 2017-01-23 NOTE — PROGRESS NOTES
[facility] Hospital Medicine  Progress Note    Patient Name: Tana Brewster  MRN: [id number]  Patient Class: IP- Inpatient   Admission Date: 1/8/2017  Length of Stay: 15 days  Attending Physician: Augie Jackson MD  Primary Care Provider: Pablo rBowne MD        Subjective:     Principal Problem:Generalized weakness    HPI:  Tana Brewster is a 61 y.o. female that  has a past medical history of Crohn disease and Hypertension. Presents to [facility] Emergency Department complaining of generalized weakness.  She has a long-standing history of chronic alcoholism.  She states she has had subacute onset of generalized weakness that is progressively worsened over the last week.  Her symptoms are constant.  She thinks they are possibly exacerbated by decreased appetite and oral intake.  Associated symptoms include ataxia and bilateral lower extremity weakness.  Associated symptoms also include intermittent dysuria and she feels like she has a urinary tract infection.  She denies abdominal pain but does have some suprapubic discomfort.  She had a normal bowel movement this morning and denies melena, hematochezia, hematemesis, nausea, vomiting, chest pain, shortness of breath, syncope, vertigo, or tremors, fever, chills, or skin lesions or infections.    In the emergency department routine laboratory studies were obtained which demonstrated multiple abnormalities including evidence of urinary tract infection and electrolyte abnormalities.      Hospital medicine has been asked to admit for further evaluation and treatment.         Hospital Course:  62 y/o with cirrhosis due to ETOH abuse who was admitted for encephalopathy, UTI and electrolyte abnormalities. Pt was treated with Rocephin for UTI and had ascites evaluated/treated with paracentesis, and mentation cleared. Pt had persistent hypokalemia now improved. Pt with marked physical debility and deconditioning requiring therapy. The  recommendation was for rehab. The patient was to be transferred to [facility] rehab but this was denied. The patient will be going to [facility]. The patient's E-coli UTI was treated.  The rest of the hospital course was unremarkable. Activity will be tolerated as per PT/OT  Diet- low NA. Follow up with PCP in one week and GI with Dr. Stevens in 2 weeks.      Interval History:  No new issues     Review of Systems   Constitutional: Negative for activity change.   HENT: Negative for congestion.    Respiratory: Negative for chest tightness and shortness of breath.    Cardiovascular: Negative for chest pain.   Gastrointestinal: Positive for abdominal distention.     Objective:     Vital Signs (Most Recent):  Temp: 98.4 °F (36.9 °C) (01/23/17 0441)  Pulse: 72 (01/23/17 0441)  Resp: 17 (01/23/17 0441)  BP: 124/60 (01/23/17 0441)  SpO2: (!) 90 % (01/23/17 0441) Vital Signs (24h Range):  Temp:  [98.2 °F (36.8 °C)-99.1 °F (37.3 °C)] 98.4 °F (36.9 °C)  Pulse:  [72-94] 72  Resp:  [17-20] 17  SpO2:  [90 %-94 %] 90 %  BP: (105-131)/(50-60) 124/60     Weight: 81 kg (178 lb 9.2 oz)  Body mass index is 32.66 kg/(m^2).    Intake/Output Summary (Last 24 hours) at 01/23/17 0750  Last data filed at 01/23/17 0442   Gross per 24 hour   Intake             1200 ml   Output                0 ml   Net             1200 ml      Physical Exam   Constitutional: She appears well-developed and well-nourished.   Eyes: Pupils are equal, round, and reactive to light.   Cardiovascular: Normal rate.    Pulmonary/Chest: Effort normal.   Abdominal: Soft.   Neurological: She is alert.       Significant Labs:     Assessment/Plan:      * Generalized weakness  Multifactorial due to ARF, UTI, chronic alcoholism, malnutrition with resultant deconditioning, debility, functional impaired mobility and balance. Treated underlying conditions with steady improvement, and continue therapy with PT/OT. Pt making good progress and is motivated, excellent rehab candidate.  [facility] but insurance denied but will pay for SNF. Discussed with  and patient will likely be discharged to SNF- when arranged.       Ascites due to alcoholic cirrhosis  Paracentesis by IR on 1/9 with >4 L removed. Improved. Pt's abdomen is chronically distended due to chronic ascites that persists. She does not have any abdominal pain and has multiple bowel movements that is expected due to lactulose. Stable.      Chronic alcohol abuse  Pt reports she has stopped drinking for the past 2 months. Continued sobriety encouraged. No signs of withdrawal and she is out of window for withdrawal. Supplement with thiamine and folic acid. Stable. Noted Elevated LFT's.     Depression  On Zoloft.     Anemia of other chronic disease  Stable.       Hypokalemia  Will check  In am.       Malnutrition of moderate degree  Stable.       CKD (chronic kidney disease), stage III  At baseline       Elevated LFTs  Trending down.     VTE Risk Mitigation         Ordered     Medium Risk of VTE  Once      01/08/17 2145     Place sequential compression device  Until discontinued      01/08/17 2145     Place SARAH hose  Until discontinued      01/08/17 2145      Will discuss with  this am. Patient states she is still very weak.     Addendum:  Spoke with . Patient will likely be discharged to [facility] today.     Augie Corbin MD  Department of Hospital Medicine   [facility]

## 2017-01-23 NOTE — ASSESSMENT & PLAN NOTE
Multifactorial due to ARF, UTI, chronic alcoholism, malnutrition with resultant deconditioning, debility, functional impaired mobility and balance. Treated underlying conditions with steady improvement, and continue therapy with PT/OT. Pt making good progress and is motivated, excellent rehab candidate. Patient was to go to [facility] yesterday but insurance denied but will pay for SNF. Discussed with  and patient will likely be discharged to SNF- when arranged.

## 2017-01-23 NOTE — PLAN OF CARE
01/23/17 1046   Final Note   Assessment Type Final Discharge Note   Discharge Disposition SNF   Discharge planning education complete? Yes   What phone number can be called within the next 1-3 days to see how you are doing after discharge? [phone]   Hospital Follow Up  Appt(s) scheduled? No  ([facility] will schedule pt appointments according to pt SNF schedule.)   [facility]fered [facility] Bedside Delivery? n/a   Discharge/Hospital Encounter Summary to ([facility]) PCP No   Referral to Outpatient Case Management complete? No   Referral to / orders for Home Health Complete? No   30 day supply of medicines given at discharge, if documented non-compliance / non-adherence? No   Any social issues identified prior to discharge? No   Did you assess the readiness or willingness of the family or caregiver to support self management of care? Yes     TAPAN spoke with Gabrielle with [facility] concerning pt discharge. According to Gabrielle she heard from Katerin with Medicaid today and pt has been approved. Gabrielle informed TAPAN Singh who is over intake is out today and instructed TAPAN to send information to her via e-mail. TAPAN e-mailed pt discharge orders and other clinicals to Samantha. Awaiting e-mail confirmation pt information has been received.      2:35PM- Beatriz with [facility] provided TAPAN with call report information. Per Beatriz pt will be going to room 101 A and the nurse can call report. TAPAN placed pt travel packet by pt blue folder and informed pt nurse. TAPAN informed Monserrat PHELAN will arrange transportation for 4:00PM. TAPAN scheduled transportation with [employer] via e-mail. Awaiting e-mail confirmation.     2:41PM- Mague with [employer] confirmed trip request.

## 2017-01-23 NOTE — PLAN OF CARE
Problem: Pressure Ulcer Risk (Gerhard Scale) (Adult,Obstetrics,Pediatric)  Intervention: Promote/Optimize Nutrition    01/20/17 0500   Nutrition Interventions   Oral Nutrition Promotion calorie dense foods provided;calorie dense liquids provided;rest periods promoted       Intervention: Prevent/Manage Excess Moisture    01/20/17 2000 01/21/17 2000   Hygiene Care   Perineal Care --  diaper changed;absorbent pad changed   Bathing/Skin Care --  incontinence care   Skin Interventions   Skin Protection skin sealant/moisture barrier applied --        Intervention: Maintain Head of Bed Elevation Less Than 30 Degrees as Tolerated    01/23/17 1202   Positioning   Head of Bed (HOB) HOB at 30 degrees       Intervention: Prevent/Minimize Sheer/Friction Injuries    01/19/17 0049 01/20/17 2000 01/21/17 2000   Positioning   Positioning/Transfer Devices --  --  pillows   Skin Interventions   Pressure Reduction Devices --  pressure-redistributing mattress utilized --    Pressure Reduction Techniques rest period provided between sit times;frequent weight shift encouraged --  --          Goal: Identify Related Risk Factors and Signs and Symptoms  Related risk factors and signs and symptoms are identified upon initiation of Human Response Clinical Practice Guideline (CPG)   Outcome: Ongoing (interventions implemented as appropriate)    01/19/17 0049   Pressure Ulcer Risk (Gerhard Scale)   Related Risk Factors (Pressure Ulcer Risk (Gerhard Scale)) excretions/secretions;fluid intake inadequate;mobility impaired       Goal: Skin Integrity  Patient will demonstrate the desired outcomes by discharge/transition of care.   Outcome: Ongoing (interventions implemented as appropriate)    01/22/17 1431   Pressure Ulcer Risk (Gerhard Scale) (Adult,Obstetrics,Pediatric)   Skin Integrity making progress toward outcome         Problem: Liver Failure, Acute/Chronic (Adult)  Intervention: Promote Neurologic Homeostasis    01/20/17 0500 01/21/17 2000  01/23/17 1202   Safety Interventions   Seizure Precautions emergency equipment at bedside --  --    Activity   Activity Type --  --  bedrest   Cognitive Interventions   Sensory Stimulation Regulation --  lighting decreased --        Intervention: Provide Oxygenation/Ventilation/Perfusion Support    01/21/17 2000 01/23/17 1202   Activity   Activity Type --  bedrest   Positioning   Head of Bed (HOB) --  HOB at 30 degrees   Respiratory Interventions   Airway/Ventilation Management calming measures promoted --        Intervention: Monitor/Manage Pain    01/20/17 0500   Safety Interventions   Medication Review/Management medications reviewed;high risk medications identified   Pain/Comfort/Sleep Interventions   Pain Management Interventions awakened for pain meds per patient request;pain management plan reviewed with patient/caregiver       Intervention: Support/Optimize Psychosocial Response to Liver Disease    01/20/17 0500 01/21/17 2000   Coping/Psychosocial Interventions   Supportive Measures --  active listening utilized   Psychosocial Support   Diversional Activities smartphone;television --    Family/Support System Care involvement promoted --        Intervention: Prevent/Manage Infection    01/10/17 1500 01/20/17 2000 01/21/17 2000   Safety Interventions   Infection Prevention --  --  rest/sleep promoted   Infection Management --  aseptic technique maintained --    Prevent/Manage Colorectal Surgical Infection   Fever Reduction/Comfort Measures --  lightweight bedding;lightweight clothing --    Hygiene Care   Oral Care mouth wash rinse;oral care provided;teeth brushed --  --        Intervention: Manage Hepatic Encephalopathy    01/20/17 2000   Neurological Interventions   Hepatic Encephalopathy Management airway protection maintained       Intervention: Manage Bleeding Risk    01/20/17 2000 01/21/17 2000   Safety Interventions   Bleeding Management dressing monitored --    Bleeding Precautions --  monitored for  signs of bleeding       Intervention: Optimize/Manage Nutrition    01/20/17 0500 01/21/17 2000   Nutrition Interventions   Oral Nutrition Promotion calorie dense foods provided;calorie dense liquids provided;rest periods promoted --    Coping/Psychosocial Interventions   Environmental Support --  calm environment promoted       Intervention: Monitor/Manage Fluid and Electrolyte Balance    01/23/17 0922   Nutrition Interventions   Fluid/Electrolyte Management fluids provided         Goal: Signs and Symptoms of Listed Potential Problems Will be Absent, Minimized or Managed (Liver Failure, Acute/Chronic)  Signs and symptoms of listed potential problems will be absent, minimized or managed by discharge/transition of care (reference Liver Failure, Acute/Chronic (Adult) CPG).   Outcome: Ongoing (interventions implemented as appropriate)    01/22/17 1431   Liver Failure, Acute/Chronic   Problems Assessed (Liver Failure, Acute/Chronic) all   Problems Present (Liver Failure, Acute/Chronic) fluid/electrolyte imbalance;situational response

## 2017-01-23 NOTE — PROGRESS NOTES
[facility]  Adult Nutrition  Consult Note    SUMMARY     Recommendations    Recommendation/Intervention:   1. Rec continue supplements   2. RD to monitor  Goals: Patient will consume >=75% of meals and 100% of supplements  Nutrition Goal Status: new  Communication of RD Recs: other (comment) (physician's sticky note)    Continuum of Care Plan    Referral to Outpatient Services:  (D/C plannin gm sodium diet with supplements as needed)    Reason for Assessment    Reason for Assessment: RD follow-up  Diagnosis:  (UTI, general weakness)  Relevent Medical History: ETOH abuse, cirrhosis, ascities, met encephalopathy, acute on chronic renal failure, malnutrition; HTN, chrohn's disease   Interdisciplinary Rounds: did not attend     General Information Comments: Drinking boost and eating 50% meals.     Nutrition Prescription Ordered    Current Diet Order: Renal, Cardiac      Oral Nutrition Supplement: Boost Plus Vanilla TID      Nutrition Risk Screen     Nutrition Risk Screen: unintentional loss of 10 lbs or more in the past 2 mos    Nutrition/Diet History    Patient Reported Diet/Restrictions/Preferences: general     Food Preferences: Denies cultural, Alevism, ethnic food preferences      Factors Affecting Nutritional Intake: decreased appetite     Labs/Tests/Procedures/Meds     Pertinent Labs Reviewed: reviewed  Pertinent Labs Comments: K 3.4  Pertinent Medications Reviewed: reviewed  Pertinent Medications Comments: phenergan, abx, folic acid, lactulose KCL    Physical Findings    Overall Physical Appearance: obese     Oral/Mouth Cavity: tooth/teeth missing  Skin: other (see comments) (abd. incision)    Anthropometrics     Height (inches): 62.01 in  Weight Method: Bed Scale  Weight (kg): 79.8 kg     Ideal Body Weight (IBW), Female: 110.05 lb     % Ideal Body Weight, Female (lb): 159.86 lb  BMI (kg/m2): 32.17  BMI Grade: 30 - 34.9- obesity - grade I      Weight Loss: unintentional      Estimated/Assessed Needs    Weight Used For Calorie Calculations: 81.2 kg (179 lb 0.2 oz)   Height (cm): 157.5 cm     Energy Need Method: Desha-St Jeor (9934-4653 kcal)     RMR (Desha-St. Jeor Equation): 1325.38      Weight Used For Protein Calculations: 81.2 kg (179 lb 0.2 oz)  Protein Requirements:  g    Fluid Need Method: RDA Method     Malnutrition (Undernutrition) Diagnosis    % Intake of Estimated Energy Needs: 75 - 100%  % Meal Intake: 50% (supplements tid)     Nutrition Diagnosis    Nutrition Problem: Inadequate energy intake  Etiology/Related To: medical issues  Nutrition Diagnosis Signs/Symptoms As Evidenced By: 25% po intake/weight loss PTA  Nutrition Diagnosis Status: Improving    Monitor and Evaluation    Food and Nutrient Intake: energy intake  Food and Nutrient Adminstration: diet order, other (specify) (supplement order)   Anthropometric Measurements: weight, weight change  Biochemical Data, Medical Tests and Procedures: electrolyte and renal panel, glucose/endocrine profile  Nutrition-Focused Physical Findings: overall appearance    Nutrition Risk    Level of Risk:  (2x/week)    Nutrition Follow-Up    RD Follow-up?: Yes

## 2017-01-23 NOTE — SUBJECTIVE & OBJECTIVE
Interval History:  No new issues     Review of Systems   Constitutional: Negative for activity change.   HENT: Negative for congestion.    Respiratory: Negative for chest tightness and shortness of breath.    Cardiovascular: Negative for chest pain.   Gastrointestinal: Positive for abdominal distention.     Objective:     Vital Signs (Most Recent):  Temp: 98.4 °F (36.9 °C) (01/23/17 0441)  Pulse: 72 (01/23/17 0441)  Resp: 17 (01/23/17 0441)  BP: 124/60 (01/23/17 0441)  SpO2: (!) 90 % (01/23/17 0441) Vital Signs (24h Range):  Temp:  [98.2 °F (36.8 °C)-99.1 °F (37.3 °C)] 98.4 °F (36.9 °C)  Pulse:  [72-94] 72  Resp:  [17-20] 17  SpO2:  [90 %-94 %] 90 %  BP: (105-131)/(50-60) 124/60     Weight: 81 kg (178 lb 9.2 oz)  Body mass index is 32.66 kg/(m^2).    Intake/Output Summary (Last 24 hours) at 01/23/17 0750  Last data filed at 01/23/17 0442   Gross per 24 hour   Intake             1200 ml   Output                0 ml   Net             1200 ml      Physical Exam   Constitutional: She appears well-developed and well-nourished.   Eyes: Pupils are equal, round, and reactive to light.   Cardiovascular: Normal rate.    Pulmonary/Chest: Effort normal.   Abdominal: Soft.   Neurological: She is alert.       Significant Labs:

## 2017-01-23 NOTE — PT/OT/SLP PROGRESS
Physical Therapy      Tana Ney  MRN: [id number]    Patient not seen today secondary to Unavailable (Comment)  Pt meeting with .  Will follow-up as able today.    Mary Carrera, PTA

## 2017-01-24 ENCOUNTER — PATIENT OUTREACH (OUTPATIENT)
Dept: ADMINISTRATIVE | Facility: CLINIC | Age: 62
End: 2017-01-24
Payer: MEDICAID

## 2017-02-09 ENCOUNTER — PATIENT OUTREACH (OUTPATIENT)
Dept: ADMINISTRATIVE | Facility: CLINIC | Age: 62
End: 2017-02-09
Payer: MEDICAID

## 2017-02-09 RX ORDER — POTASSIUM CHLORIDE 20 MEQ/1
10 TABLET, EXTENDED RELEASE ORAL DAILY
COMMUNITY
End: 2022-04-28

## 2017-02-09 RX ORDER — LORAZEPAM 0.5 MG/1
1 TABLET ORAL NIGHTLY
COMMUNITY
End: 2018-07-25

## 2017-02-09 RX ORDER — LORAZEPAM 0.5 MG/1
0.5 TABLET ORAL DAILY PRN
COMMUNITY
End: 2018-07-25

## 2017-02-09 NOTE — PROGRESS NOTES
Ms. Henderson voiced concerns of psych issues being discovered. Patient asked for ativan, shut down and wouldn't talk. Patients daughter and mother will call for discharge follow-up to PCP..  SW voiced concerns of not being able to reach a  to accept patient and patient was rushing to leave the facility.  Patient requested and was started on Ativan as ordered per facility MD.

## 2018-08-20 PROBLEM — I10 HYPERTENSION, ESSENTIAL: Status: ACTIVE | Noted: 2018-08-20

## 2018-08-20 PROBLEM — E83.39 HYPERPHOSPHATEMIA: Status: ACTIVE | Noted: 2018-08-20

## 2018-08-20 PROBLEM — R60.0 LOCALIZED EDEMA: Status: ACTIVE | Noted: 2018-08-20

## 2018-08-20 PROBLEM — D63.1 ANEMIA OF CHRONIC RENAL FAILURE, STAGE 3 (MODERATE): Status: ACTIVE | Noted: 2018-08-20

## 2018-08-20 PROBLEM — N18.30 ANEMIA OF CHRONIC RENAL FAILURE, STAGE 3 (MODERATE): Status: ACTIVE | Noted: 2018-08-20

## 2018-08-20 PROBLEM — R80.9 MICROALBUMINURIA: Status: ACTIVE | Noted: 2018-08-20

## 2018-08-20 PROBLEM — E79.0 HYPERURICEMIA: Status: ACTIVE | Noted: 2018-08-20

## 2018-08-20 PROBLEM — E87.20 ACIDOSIS: Status: ACTIVE | Noted: 2018-08-20

## 2018-10-24 ENCOUNTER — TELEPHONE (OUTPATIENT)
Dept: SPORTS MEDICINE | Facility: CLINIC | Age: 63
End: 2018-10-24

## 2018-10-24 NOTE — TELEPHONE ENCOUNTER
Reaching out to patient to confirm xray and clinic appointment with Dr. Lama at [facility].    Left shoulder - bringing external xrays

## 2018-10-26 ENCOUNTER — TELEPHONE (OUTPATIENT)
Dept: ORTHOPEDICS | Facility: CLINIC | Age: 63
End: 2018-10-26

## 2018-10-26 DIAGNOSIS — M25.551 RIGHT HIP PAIN: Primary | ICD-10-CM

## 2018-11-05 ENCOUNTER — OFFICE VISIT (OUTPATIENT)
Dept: ORTHOPEDICS | Facility: CLINIC | Age: 63
End: 2018-11-05

## 2018-11-05 VITALS
DIASTOLIC BLOOD PRESSURE: 80 MMHG | SYSTOLIC BLOOD PRESSURE: 100 MMHG | WEIGHT: 193 LBS | BODY MASS INDEX: 35.51 KG/M2 | HEIGHT: 62 IN

## 2018-11-05 DIAGNOSIS — M25.551 LATERAL PAIN OF RIGHT HIP: Primary | ICD-10-CM

## 2018-11-05 DIAGNOSIS — M70.61 TROCHANTERIC BURSITIS OF RIGHT HIP: ICD-10-CM

## 2018-11-05 DIAGNOSIS — M25.511 CHRONIC RIGHT SHOULDER PAIN: ICD-10-CM

## 2018-11-05 DIAGNOSIS — R29.898 WEAKNESS OF RIGHT LOWER EXTREMITY: ICD-10-CM

## 2018-11-05 DIAGNOSIS — G89.29 CHRONIC RIGHT SHOULDER PAIN: ICD-10-CM

## 2018-11-05 PROCEDURE — 99213 OFFICE O/P EST LOW 20 MIN: CPT | Mod: PBBFAC,PN | Performed by: ORTHOPAEDIC SURGERY

## 2018-11-05 PROCEDURE — 99999 PR PBB SHADOW E&M-EST. PATIENT-LVL III: CPT | Mod: PBBFAC,,, | Performed by: ORTHOPAEDIC SURGERY

## 2018-11-05 PROCEDURE — 99204 OFFICE O/P NEW MOD 45 MIN: CPT | Mod: 25,S$PBB,, | Performed by: ORTHOPAEDIC SURGERY

## 2018-11-05 PROCEDURE — 20610 DRAIN/INJ JOINT/BURSA W/O US: CPT | Mod: PBBFAC,PN | Performed by: ORTHOPAEDIC SURGERY

## 2018-11-05 PROCEDURE — 20610 DRAIN/INJ JOINT/BURSA W/O US: CPT | Mod: S$PBB,RT,, | Performed by: ORTHOPAEDIC SURGERY

## 2018-11-05 RX ORDER — VITAMIN B COMPLEX
1 CAPSULE ORAL DAILY
COMMUNITY
End: 2023-08-22

## 2018-11-05 RX ORDER — PANTOPRAZOLE SODIUM 40 MG/1
40 TABLET, DELAYED RELEASE ORAL DAILY
COMMUNITY
End: 2021-07-22

## 2018-11-05 RX ORDER — TRIAMCINOLONE ACETONIDE 40 MG/ML
40 INJECTION, SUSPENSION INTRA-ARTICULAR; INTRAMUSCULAR
Status: SHIPPED | OUTPATIENT
Start: 2018-11-05

## 2018-11-05 NOTE — PROGRESS NOTES
CC: right hip pain    63 y.o. Female presents today for evaluation of her right hip pain. She does not recall an injury or trauma that could have caused it.   How long: Patient reports her pain has been present for about 3 years. Patient reports her pain came on gradually and continues to get worse. She uses a walker to help with ambulation. She complains of her right leg feeling weak.  What makes it better: Patient reports nothing is making her feel better.   What makes it worse: Patient reports stairs bother her, she uses her arms to lift herself.   Does it radiate: Patient states she used to have radiating pain but that it stopped about 6 months ago.   Attempted treatments: Patient had a steroid injection years ago but states it only worked for about a week. Patient was prescribed tramadol but states that it doesn't help. Patient has tried heat, ice, and creams. She does not feel any relief from her attempted treatments.   Pain score: She rates her pain as an 8/10.  Any mechanical symptoms: Patient reports her knees have given out on her in the past but not recently, she denies any mechanical symptoms.  Feelings of instability: Patient reports that she feels like her hip will give out on her.  Affecting ADLs: Patient reports that her pain is keeping her from her daily activities such as walking.     REVIEW OF SYSTEMS:   Constitution: Patient denies fever, chills, night sweats. Patient reports she recently gained weight   Eyes: Patient denies eye pain or vision changes.  HENT: Patient denies ear pain, sore throat, or nasal discharge. Patient reports she gets daily headaches  CVS: Patient denies any current chest pain  Lungs: Patient denies cough. Patient reports shortness of breath when walking for too long  Abd: Patient denies stomach pain, nausea, or vomiting.  Skin: Patient denies skin rash or itching.    Hematologic/Lymphatic: Patient reports she bruises easily.  Musculoskeletal: Patient denies recent falls.  See HPI.  Psych: Patient reports anxiety and nervousness but is not on any medication for it     PAST MEDICAL HISTORY:   Past Medical History:   Diagnosis Date    Anemia     Crohn disease     Disorder of kidney and ureter     GERD (gastroesophageal reflux disease)     Hypertension     Osteoporosis        PAST SURGICAL HISTORY:   Past Surgical History:   Procedure Laterality Date    APPENDECTOMY      LUNG CANCER SURGERY Right        FAMILY HISTORY:   Family History   Problem Relation Age of Onset    Hypertension Mother     Kidney disease Mother     Gout Father     Cancer Sister        SOCIAL HISTORY:   Social History     Socioeconomic History    Marital status:      Spouse name: Not on file    Number of children: Not on file    Years of education: Not on file    Highest education level: Not on file   Social Needs    Financial resource strain: Not on file    Food insecurity - worry: Not on file    Food insecurity - inability: Not on file    Transportation needs - medical: Not on file    Transportation needs - non-medical: Not on file   Occupational History    Not on file   Tobacco Use    Smoking status: Former Smoker     Types: Cigarettes   Substance and Sexual Activity    Alcohol use: Yes     Comment: Former     Drug use: No    Sexual activity: Not on file   Other Topics Concern    Not on file   Social History Narrative    Not on file       MEDICATIONS:     Current Outpatient Medications:     allopurinol (ZYLOPRIM) 100 MG tablet, Take 1 tablet (100 mg total) by mouth once daily., Disp: 90 tablet, Rfl: 3    escitalopram oxalate (LEXAPRO) 10 MG tablet, Take 20 mg by mouth once daily., Disp: , Rfl: 1    FABB 2.2-25-1 mg Tab, Take 1 tablet by mouth once daily., Disp: , Rfl: 2    ferrous sulfate 324 mg (65 mg iron) TbEC, Take 1 tablet (324 mg total) by mouth 2 (two) times daily., Disp: 180 tablet, Rfl: 3    furosemide (LASIX) 40 MG tablet, Take 40 mg by mouth once daily., Disp: ,  "Rfl:     lidocaine HCl 3 % Crea, APPLY 2 grams to the painful area THREE TIMES A DAY, Disp: , Rfl: 3    omeprazole (PRILOSEC) 40 MG capsule, Take 40 mg by mouth every morning., Disp: , Rfl: 2    potassium chloride SA (K-DUR,KLOR-CON) 20 MEQ tablet, Take 10 mEq by mouth once daily. , Disp: , Rfl:     propranolol (INDERAL) 10 MG tablet, Take 1 tablet (10 mg total) by mouth 2 (two) times daily., Disp: 180 tablet, Rfl: 3    QUEtiapine (SEROQUEL) 25 MG Tab, Take 50 mg by mouth 2 (two) times daily., Disp: , Rfl:     rifAXIMin (XIFAXAN) 550 mg Tab, Take 1 tablet (550 mg total) by mouth 2 (two) times daily., Disp: , Rfl:     sodium bicarbonate 650 MG tablet, Take 1 tablet (650 mg total) by mouth once daily., Disp: 90 tablet, Rfl: 3    spironolactone (ALDACTONE) 25 MG tablet, Take 25 mg by mouth 2 (two) times daily., Disp: , Rfl: 2    traMADol (ULTRAM) 50 mg tablet, Take 50 mg by mouth every 6 (six) hours., Disp: , Rfl:     ALLERGIES:   Review of patient's allergies indicates:   Allergen Reactions    Lisinopril Anaphylaxis    Ciprofloxacin         PHYSICAL EXAMINATION:  /80 (BP Location: Left arm, Patient Position: Sitting, BP Method: Medium (Manual))   Ht 5' 2" (1.575 m)   Wt 87.5 kg (193 lb)   BMI 35.30 kg/m²   Vitals signs and nursing note have been reviewed.  General: In no acute distress, well developed, well nourished, no diaphoresis  Eyes: EOM full and smooth, no eye redness or discharge  HENT: normocephalic and atraumatic, neck supple, trachea midline, no nasal discharge, no external ear redness or discharge  Cardiovascular: no LE edema  Lungs: respirations non-labored, no conversational dyspnea   Abd: non-distended, no rigidity  MSK: no amputation or deformity, no swelling of extremities  Neuro: AAOx3, CN2-12 grossly intact  Skin: No rashes, warm and dry  Psychiatric: cooperative, pleasant, mood and affect appropriate for age    HIP: RIGHT  The affected hip is compared to the contralateral " hip.    Observation:    There is no edema, erythema, or ecchymosis in the lumbosacral region.   There is no Trendelenburg sign on either side  No obvious pelvic obliquity while standing.    No thoracolumbar scoliosis observed.    No midline skin abnormalities.  No atrophy noted in the lower limbs.    ROM:   Passive hip flexion to 120° on left and 120° on right.    Passive hip internal rotation to 45° on left and 45° on right.    Passive hip external rotation to 45° on left and 45° on right.    Passive hip abduction to 45° on left and 45° on right.    All motions above with pain, especially abduction and adduction, felt on the lateral hip.    Tenderness To Palpation:  No tenderness at the ASIS, AIIS, PSIS, PIIS, iliac crest, pubic bones, ischial tuberosity.  No tenderness throughout the lumbar spine, iliolumbar region, or posterior pelvis.  No tenderness throughout the sacrum or piriformis  Moderate tenderness over the greater trochanteric bursa or greater/lesser trochanters.  Moderate tenderness at the glut attachments on the greater trochanter  No tenderness over proximal IT band or hip flexor musculature.    Strength Testing:  Hip flexion - 5/5 on left and 4/5 on right  Hip extension - 5/5 on left and 4+/5 on right  Hip adduction - 5/5 on left and 4+/5 on right  Hip abduction - 5/5 on left and 4+/5 on right  Knee flexion - 5/5 on left and 5/5 on right  Knee extension - 5/5 on left and 5/5 on right    Special Tests:  Supine straight leg raise - negative  Hamstring flexibility symmetric - both tight    Log roll - negative  LIVAN - negative for pain at the joint but pain is felt at lateral hip  FADIR - negative for pain at the joint but pain is felt at lateral hip  No pain with posterior hip capsule compression     Neurovascular Exam:  Antalgic gait requiring a walker  2+ femoral, DP, and PT pulses BL.  No skin changes, no abnormal hair distribution.  Sensation intact to light touch throughout the obturator and  medial/lateral/posterior femoral cutaneous nerves.  Capillary refill intact to <2 seconds in all lower limb digits.      IMAGIN. X-ray ordered due to right hip pain.  2. X-ray images were reviewed personally by me and then directly with patient.  3. FINDINGS: X-ray images obtained demonstrate no cortical irregularities, sclerosis, osteophyte formation, or subchonral cysts. There is little to no joint space narrowing of either hip.  4. IMPRESSION: No pathology or irregularities appreciated.     PROCEDURE:  Large Joint Injection  Greater trochanteric bursa, right    Performed by: IRVING SANTIZO  Authorized by: IRVING SANTIZO   Consent Done?: Yes (Verbal and written)  Indications: Pain  Site marked: The procedure site was marked   Timeout: Prior to procedure the correct patient, procedure, and site was verified     Location: Greater trochanteric bursa, right  Prep: Patient was prepped and draped using alcohol and chlorhexadine  Ultrasonic Guidance for needle placement: no  Needle size: 22 G, 3.5  Procedure: After the skin was marked and cleaned, the needle was advanced until reaching the trochanteric bursa. After ensuring there was no vessel infiltration, the mixture of 1cc triamcinolon and 3cc 1% lidocaine was injected.  Approach: Lateral  Medications: 40 mg triamcinolone acetonide 40 mg/mL  Patient tolerance: Patient tolerated the procedure well with no immediate complications. Improvement noted shortly afterward and she was able to walk without her walker when leaving the office.    Triamcinolone:  NDC: 003-0293-05  LOT: [id number]  EXP: DEC 2019      ASSESSMENT:      ICD-10-CM ICD-9-CM   1. Lateral pain of right hip M25.551 719.45   2. Trochanteric bursitis of right hip M70.61 726.5   3. Weakness of right lower extremity R29.898 729.89   4. Chronic right shoulder pain M25.511 719.41    G89.29 338.29     PLAN:  1-3. Right hip pain, weakness  - XRs ordered in the office today and images were personally  reviewed with the patient. See above for further detail. This does not seem to be intraarticular due to her location of symptoms and my exam findings.    - Discussed options, including a focused PT regimen at getting her stronger and emphasized this will help with her walking and pain. She and her daughter agree. Referral placed.    - To help with pain today, and since medications metabolized in the liver and kidneys are not great choices due to her problems with both, I recommend we try a bursal steroid injection today to see how she does. This may also help her better tolerate PT. See above for further detail.    - Advised icing to the affected area for pain relief as well.     4. Right shoulder pain -  - She brought in a disc containing recent films for me to review. She would like this looked at further at her next appointment.      Future planning includes - further assess right shoulder, possible injection there    All questions were answered to the best of my ability and all concerns were addressed at this time.    Follow up in 6-8 weeks for above, or sooner if needed.

## 2019-02-02 NOTE — OP NOTE
Encounter addended by: Daysi Ackerman R.N. on: 2/2/2019  8:07 AM<BR>    Actions taken: Flowsheet accepted Interventional Radiology Procedure Note    Procedure: paracentesis    Pre procedure diagnosis: ascites    Post procedure diagnosis: same    : MD Tawanna    Specimens removed: 2.3L, a portion of this was sent to the lab for the requested studies or for freeze and hold if no studies were ordered    Estimated Blood Loss: 0 ml     Complications: none     Findings:  ascites

## 2019-03-31 NOTE — PROGRESS NOTES
CC: right hip and bilateral shoulder pain    Miss Brewster is here today for follow up evaluation of her right hip pain. She would also like to discuss her bilateral shoulder pain. Patient reports she had one month of pain relief after the trochanteric bursa Injection done at her last visit in November. She has not been doing to PT due to lack of insurance. She now has medicare and is unsure if PT is covered. Patient denies any new injury or trauma. She reports she has a lump on the left side of her neck that is causing her pain. Patient reports both of her shoulders hurt but her right one bothers her more. Patient reports she has had right shoulder pain for about 1 year. She takes Tramadol for her pain but she states this only helps her for a short time. She is now having trouble sleeping. Patient reports she had physical therapy on her shoulder last year but this did not help her pain at all. Patient tried a topical cream on her shoulder but it did not help her pain.     Recall from 11/5/2019  63 y.o. Female presents today for evaluation of her right hip pain. She does not recall an injury or trauma that could have caused it.   How long: Patient reports her pain has been present for about 3 years. Patient reports her pain came on gradually and continues to get worse. She uses a walker to help with ambulation. She complains of her right leg feeling weak.  What makes it better: Patient reports nothing is making her feel better.   What makes it worse: Patient reports stairs bother her, she uses her arms to lift herself.   Does it radiate: Patient states she used to have radiating pain but that it stopped about 6 months ago.   Attempted treatments: Patient had a steroid injection years ago but states it only worked for about a week. Patient was prescribed tramadol but states that it doesn't help. Patient has tried heat, ice, and creams. She does not feel any relief from her attempted treatments.   Pain score: She rates  her pain as an 8/10.  Any mechanical symptoms: Patient reports her knees have given out on her in the past but not recently, she denies any mechanical symptoms.  Feelings of instability: Patient reports that she feels like her hip will give out on her.  Affecting ADLs: Patient reports that her pain is keeping her from her daily activities such as walking.     REVIEW OF SYSTEMS:   Constitution: Patient denies fever, chills, night sweats.   Eyes: Patient denies eye pain or vision changes.  HENT: Patient denies headache, ear pain, sore throat, or nasal discharge.  CVS: Patient denies any current chest pain  Lungs: Patient denies cough. Patient reports shortness when doing activities at times but she is not currently SOB.  Abd: Patient denies stomach pain, nausea, or vomiting.  Skin: Patient denies skin rash or itching.    Hematologic/Lymphatic: Patient reports she bruises easily.  Musculoskeletal: Patient denies recent falls. See HPI.  Psych: Patient reports anxiety and nervousness but is not on any medication for it.    PAST MEDICAL HISTORY:   Past Medical History:   Diagnosis Date    Anemia     Crohn disease     Disorder of kidney and ureter     GERD (gastroesophageal reflux disease)     Hypertension     Osteoporosis        PAST SURGICAL HISTORY:   Past Surgical History:   Procedure Laterality Date    APPENDECTOMY      LUNG CANCER SURGERY Right        FAMILY HISTORY:   Family History   Problem Relation Age of Onset    Hypertension Mother     Kidney disease Mother     Gout Father     Cancer Sister        SOCIAL HISTORY:   Social History     Socioeconomic History    Marital status:      Spouse name: Not on file    Number of children: Not on file    Years of education: Not on file    Highest education level: Not on file   Occupational History    Not on file   Social Needs    Financial resource strain: Not on file    Food insecurity:     Worry: Not on file     Inability: Not on file     Transportation needs:     Medical: Not on file     Non-medical: Not on file   Tobacco Use    Smoking status: Former Smoker     Types: Cigarettes   Substance and Sexual Activity    Alcohol use: Yes     Comment: Former     Drug use: No    Sexual activity: Not on file   Lifestyle    Physical activity:     Days per week: Not on file     Minutes per session: Not on file    Stress: Not on file   Relationships    Social connections:     Talks on phone: Not on file     Gets together: Not on file     Attends Mosque service: Not on file     Active member of club or organization: Not on file     Attends meetings of clubs or organizations: Not on file     Relationship status: Not on file    Intimate partner violence:     Fear of current or ex partner: Not on file     Emotionally abused: Not on file     Physically abused: Not on file     Forced sexual activity: Not on file   Other Topics Concern    Not on file   Social History Narrative    Not on file       MEDICATIONS:     Current Outpatient Medications:     allopurinol (ZYLOPRIM) 100 MG tablet, Take 1 tablet (100 mg total) by mouth once daily., Disp: 90 tablet, Rfl: 3    b complex vitamins capsule, Take 1 capsule by mouth once daily., Disp: , Rfl:     escitalopram oxalate (LEXAPRO) 10 MG tablet, Take 20 mg by mouth once daily., Disp: , Rfl: 1    FABB 2.2-25-1 mg Tab, Take 1 tablet by mouth once daily., Disp: , Rfl: 2    ferrous sulfate 324 mg (65 mg iron) TbEC, Take 1 tablet (324 mg total) by mouth 2 (two) times daily. (Patient taking differently: Take by mouth 2 (two) times daily. ), Disp: 180 tablet, Rfl: 3    furosemide (LASIX) 40 MG tablet, Take 40 mg by mouth once daily., Disp: , Rfl:     lidocaine HCl 3 % Crea, APPLY 2 grams to the painful area THREE TIMES A DAY, Disp: , Rfl: 3    pantoprazole (PROTONIX) 40 MG tablet, Take 40 mg by mouth once daily., Disp: , Rfl:     potassium chloride SA (K-DUR,KLOR-CON) 20 MEQ tablet, Take 10 mEq by mouth once  "daily. , Disp: , Rfl:     propranolol (INDERAL) 10 MG tablet, Take 1 tablet (10 mg total) by mouth 2 (two) times daily., Disp: 180 tablet, Rfl: 3    QUEtiapine (SEROQUEL) 25 MG Tab, Take 50 mg by mouth 2 (two) times daily., Disp: , Rfl:     rifAXIMin (XIFAXAN) 550 mg Tab, Take 1 tablet (550 mg total) by mouth 2 (two) times daily., Disp: , Rfl:     sodium bicarbonate 650 MG tablet, Take 1 tablet (650 mg total) by mouth once daily., Disp: 90 tablet, Rfl: 3    spironolactone (ALDACTONE) 25 MG tablet, Take 25 mg by mouth 2 (two) times daily., Disp: , Rfl: 2    traMADol (ULTRAM) 50 mg tablet, Take 50 mg by mouth every 6 (six) hours., Disp: , Rfl:     Current Facility-Administered Medications:     triamcinolone acetonide injection 40 mg, 40 mg, Intra-articular, 1 time in Clinic/HOD, Kobi Landeros DO    ALLERGIES:   Review of patient's allergies indicates:   Allergen Reactions    Lisinopril Anaphylaxis    Ciprofloxacin         PHYSICAL EXAMINATION:  BP (!) 102/58   Ht 5' 2" (1.575 m)   Wt 92.5 kg (204 lb)   BMI 37.31 kg/m²   Vitals signs and nursing note have been reviewed.  General: In no acute distress, well developed, well nourished, no diaphoresis  Eyes: EOM full and smooth, no eye redness or discharge  HENT: normocephalic and atraumatic, neck supple, trachea midline, no nasal discharge, no external ear redness or discharge  Cardiovascular: no LE edema  Lungs: respirations non-labored, no conversational dyspnea   Abd: non-distended, no rigidity  MSK: no amputation or deformity, no swelling of extremities  Neuro: AAOx3, CN2-12 grossly intact  Skin: No rashes, warm and dry  Psychiatric: cooperative, pleasant, mood and affect appropriate for age    Shoulder: bilateral  The affected shoulder is compared to the contralateral shoulder.    Observation:    CERVICAL SPINE  Normal head carriage. Mild thoracic kyphosis.  Full AROM in flexion, extension, sidebending, and rotation.    SHOULDER  No ecchymosis, edema, " or erythema throughout the shoulder girdle.  No sternal, clavicular, or acromial deformities bilaterally.  No atrophy of the pectorals, deltoids, supraspinatus, infraspinatus, or biceps bilaterally.  No asymmetry of shoulders bilaterally.    ROM:  Active flexion to 110° bilaterally.   Active abduction to 100° bilaterally.    Active internal rotation to L5 bilaterally.    Active external rotation to top of shoulder bilaterally.    All range of motion limited due to pain.    Tenderness:  Mild tenderness at the SC or AC joint  No tenderness over the clavicle   No tenderness over biceps tendon or bicipital groove  No tenderness over subacromial space    Strength Testing:  Deltoid - 5/5  Biceps - 5/5  Triceps - 5/5  Wrist extension - 5/5  Wrist flexion - 5/5   - 5/5  Finger extension - 5/5  Finger abduction - 5/5    Special Tests:  Spurlings test - negative  Lhermittes test - negative    Empty can test - positive for pain  Full can test - positive for pain  Bear hug test - positive for pain  Belly press test - positive for pain  Resisted internal rotation - positive for pain    Neer's test - positive for pain  Hawkin's-Jarad test - positive for pain    OJuliens test - negative  Biceps load 1 test - negative  Biceps load 2 test - negative    Speed's test - positive for pain  Yergason's test - negative    Sulcus sign - none  AP load and shift laxity - none    Neurovascular Exam:  UPPER EXTREMITY  2+ radial pulses BL  Sensation intact to light touch in the distal median, radial, and ulnar nerve distributions bilaterally.  Capillary refill intact <2 seconds in all digits bilaterally    HIP: RIGHT  The affected hip is compared to the contralateral hip.     Observation:    There is no edema, erythema, or ecchymosis in the lumbosacral region.   There is no Trendelenburg sign on either side  No obvious pelvic obliquity while standing.    No thoracolumbar scoliosis observed.    No midline skin abnormalities.  No atrophy  noted in the lower limbs.     ROM:   Passive hip flexion to 120° on left and 120° on right.    Passive hip internal rotation to 45° on left and 45° on right.    Passive hip external rotation to 45° on left and 45° on right.    Passive hip abduction to 45° on left and 45° on right.    All motions above with pain, especially abduction and adduction, felt on the lateral hip.     Tenderness To Palpation:  No tenderness at the ASIS, AIIS, PSIS, PIIS, iliac crest, pubic bones, ischial tuberosity.  No tenderness throughout the lumbar spine, iliolumbar region, or posterior pelvis.  No tenderness throughout the sacrum or piriformis  Tenderness over the greater trochanteric bursa or greater/lesser trochanters.  Tenderness at the glut attachments on the greater trochanter  No tenderness over proximal IT band or hip flexor musculature.     Strength Testing:  Hip flexion - 5/5 on left and 4/5 on right  Hip extension - 5/5 on left and 4+/5 on right  Hip adduction - 5/5 on left and 4+/5 on right  Hip abduction - 5/5 on left and 4+/5 on right  Knee flexion - 5/5 on left and 5/5 on right  Knee extension - 5/5 on left and 5/5 on right     Special Tests:  Supine straight leg raise - negative     Log roll - negative  LIVAN - negative for pain at the joint but pain is felt at lateral hip  FADIR - negative for pain at the joint but pain is felt at lateral hip  No pain with posterior hip capsule compression     Neurovascular Exam:  LOWER EXTREMITY  Antalgic gait  2+ femoral, DP, and PT pulses BL.  No skin changes, no abnormal hair distribution.  Sensation intact to light touch throughout the obturator and medial/lateral/posterior femoral cutaneous nerves.  Capillary refill intact to <2 seconds in all lower limb digits.      ASSESSMENT:      ICD-10-CM ICD-9-CM   1. Chronic pain of both shoulders M25.511 719.41    G89.29 338.29    M25.512    2. Right hip pain M25.551 719.45   3. Muscle weakness M62.81 728.87        PLAN:  1.  Chronic  bilateral shoulder pain -     - She admits to bilateral shoulder pain and now decreased range of motion for the past year or so.  She denies any specific injury or trauma to either shoulder, but states that both of them hurt and are affecting her sleep.  She has a radiology report of a right shoulder x-ray that said that there is AC joint arthritis that is likely causing impingement.    - As both shoulders hurt and x-rays were obtained at a non-[facility] facility, I recommend repeat x-rays of both shoulders today so that they can be seen by me.  We will call with the results.    - After reviewing the x-rays will have a better idea about where to go with this.  I do think that strengthening and stabilizing exercises through through physical therapy will be a good help for her.  She is unable to take NSAIDs due to chronic kidney disease so she may benefit from cortisone injections in the future.    - She states that her PCP was going to put in a pain management referral for her.  I advised that all the medications can help with her pain, she needs to start becoming more active and doing exercises on a regular basis.      2-3. Right hip pain, weakness - improved, then deteriorated    - She admits to about the same hip pain as her last visit.  She did admit to about a month of improvement after a trochanteric bursa injection, but her symptoms returned.    - She states that she was unable to go to physical therapy since her last visit due to lack of insurance coverage.  She states she now has Medicare so I placed another referral for her to go to this to focus on hip and core strengthening and to work on her conditioning.  She is interested in going to a non [facility] facility that is close to her home ([place]), so the referral form was printed and provided to her today.    - Advised continued icing to the affected area for pain relief as well, which it seems like she has not been doing.       Future planning  includes - strong emphasis today on her becoming more active and being diligent in doing her physical therapy and home exercises.  Cortisone injections can be helpful, but if she does not have the underlying strength and stability these will not last very long and I do not want to get into a habit of just injecting every place that she hurts when she comes in.    All questions were answered to the best of my ability and all concerns were addressed at this time.    Follow up after physical therapy for above, or sooner if needed.    I advised her to talk to her primary care physician regarding her neck pain.

## 2019-04-01 ENCOUNTER — OFFICE VISIT (OUTPATIENT)
Dept: ORTHOPEDICS | Facility: CLINIC | Age: 64
End: 2019-04-01
Payer: MEDICARE

## 2019-04-01 VITALS
DIASTOLIC BLOOD PRESSURE: 58 MMHG | WEIGHT: 204 LBS | BODY MASS INDEX: 37.54 KG/M2 | SYSTOLIC BLOOD PRESSURE: 102 MMHG | HEIGHT: 62 IN

## 2019-04-01 DIAGNOSIS — M25.511 CHRONIC PAIN OF BOTH SHOULDERS: Primary | ICD-10-CM

## 2019-04-01 DIAGNOSIS — M25.512 CHRONIC PAIN OF BOTH SHOULDERS: Primary | ICD-10-CM

## 2019-04-01 DIAGNOSIS — M25.551 RIGHT HIP PAIN: ICD-10-CM

## 2019-04-01 DIAGNOSIS — G89.29 CHRONIC PAIN OF BOTH SHOULDERS: Primary | ICD-10-CM

## 2019-04-01 DIAGNOSIS — M62.81 MUSCLE WEAKNESS: ICD-10-CM

## 2019-04-01 PROCEDURE — 99999 PR PBB SHADOW E&M-EST. PATIENT-LVL III: ICD-10-PCS | Mod: PBBFAC,,, | Performed by: ORTHOPAEDIC SURGERY

## 2019-04-01 PROCEDURE — 99214 PR OFFICE/OUTPT VISIT, EST, LEVL IV, 30-39 MIN: ICD-10-PCS | Mod: S$PBB,ICN,CMP, | Performed by: ORTHOPAEDIC SURGERY

## 2019-04-01 PROCEDURE — 99213 OFFICE O/P EST LOW 20 MIN: CPT | Mod: PBBFAC,PN | Performed by: ORTHOPAEDIC SURGERY

## 2019-04-01 PROCEDURE — 99214 OFFICE O/P EST MOD 30 MIN: CPT | Mod: S$PBB,ICN,CMP, | Performed by: ORTHOPAEDIC SURGERY

## 2019-04-01 PROCEDURE — 99999 PR PBB SHADOW E&M-EST. PATIENT-LVL III: CPT | Mod: PBBFAC,,, | Performed by: ORTHOPAEDIC SURGERY

## 2021-01-19 ENCOUNTER — TELEPHONE (OUTPATIENT)
Dept: ADMINISTRATIVE | Facility: OTHER | Age: 66
End: 2021-01-19

## 2021-01-19 ENCOUNTER — HOSPITAL ENCOUNTER (EMERGENCY)
Facility: HOSPITAL | Age: 66
Discharge: HOME | End: 2021-01-19
Attending: EMERGENCY MEDICINE
Payer: MEDICARE

## 2021-01-19 VITALS
SYSTOLIC BLOOD PRESSURE: 155 MMHG | DIASTOLIC BLOOD PRESSURE: 78 MMHG | RESPIRATION RATE: 18 BRPM | BODY MASS INDEX: 39.56 KG/M2 | TEMPERATURE: 98 F | HEART RATE: 72 BPM | HEIGHT: 62 IN | OXYGEN SATURATION: 99 % | WEIGHT: 215 LBS

## 2021-01-19 DIAGNOSIS — Z20.822 SUSPECTED COVID-19 VIRUS INFECTION: Primary | ICD-10-CM

## 2021-01-19 DIAGNOSIS — J30.9 ALLERGIC RHINITIS, UNSPECIFIED SEASONALITY, UNSPECIFIED TRIGGER: ICD-10-CM

## 2021-01-19 DIAGNOSIS — J20.9 ACUTE BRONCHITIS, UNSPECIFIED ORGANISM: ICD-10-CM

## 2021-01-19 LAB
CTP QC/QA: YES
INFLUENZA A ANTIGEN, POC: NEGATIVE
INFLUENZA B ANTIGEN, POC: NEGATIVE
SARS-COV-2 RDRP RESP QL NAA+PROBE: NEGATIVE

## 2021-01-19 PROCEDURE — 99284 EMERGENCY DEPT VISIT MOD MDM: CPT | Mod: 25,ER

## 2021-01-19 PROCEDURE — U0003 INFECTIOUS AGENT DETECTION BY NUCLEIC ACID (DNA OR RNA); SEVERE ACUTE RESPIRATORY SYNDROME CORONAVIRUS 2 (SARS-COV-2) (CORONAVIRUS DISEASE [COVID-19]), AMPLIFIED PROBE TECHNIQUE, MAKING USE OF HIGH THROUGHPUT TECHNOLOGIES AS DESCRIBED BY CMS-2020-01-R: HCPCS

## 2021-01-19 PROCEDURE — U0002 COVID-19 LAB TEST NON-CDC: HCPCS | Mod: ER | Performed by: NURSE PRACTITIONER

## 2021-01-19 PROCEDURE — 87804 INFLUENZA ASSAY W/OPTIC: CPT | Mod: ER

## 2021-01-19 RX ORDER — FLUTICASONE PROPIONATE 50 MCG
1 SPRAY, SUSPENSION (ML) NASAL 2 TIMES DAILY PRN
Qty: 15 G | Refills: 0 | Status: SHIPPED | OUTPATIENT
Start: 2021-01-19 | End: 2021-07-22

## 2021-01-19 RX ORDER — DOXYCYCLINE 100 MG/1
100 CAPSULE ORAL 2 TIMES DAILY
Qty: 20 CAPSULE | Refills: 0 | Status: SHIPPED | OUTPATIENT
Start: 2021-01-19 | End: 2021-01-29

## 2021-01-19 RX ORDER — CETIRIZINE HYDROCHLORIDE 10 MG/1
10 TABLET ORAL DAILY
Qty: 30 TABLET | Refills: 0 | Status: SHIPPED | OUTPATIENT
Start: 2021-01-19 | End: 2021-07-22

## 2021-01-19 RX ORDER — DEXTROMETHORPHAN HYDROBROMIDE, GUAIFENESIN 5; 100 MG/5ML; MG/5ML
650 LIQUID ORAL EVERY 8 HOURS
Qty: 20 TABLET | Refills: 0 | Status: SHIPPED | OUTPATIENT
Start: 2021-01-19 | End: 2023-05-18

## 2021-01-21 LAB — SARS-COV-2 RNA RESP QL NAA+PROBE: NOT DETECTED

## 2022-04-28 PROBLEM — K70.31 ALCOHOLIC CIRRHOSIS OF LIVER WITH ASCITES: Status: ACTIVE | Noted: 2022-04-28

## 2022-04-28 PROBLEM — I42.6 ALCOHOLIC CARDIOMYOPATHY: Status: ACTIVE | Noted: 2022-04-28

## 2022-04-28 PROBLEM — N18.4 ANEMIA OF CHRONIC RENAL FAILURE, STAGE 4 (SEVERE): Status: ACTIVE | Noted: 2018-08-20

## 2022-04-28 PROBLEM — N18.4 CHRONIC KIDNEY DISEASE, STAGE IV (SEVERE): Status: ACTIVE | Noted: 2017-01-21

## 2023-05-10 ENCOUNTER — LAB VISIT (OUTPATIENT)
Dept: LAB | Facility: HOSPITAL | Age: 68
End: 2023-05-10
Attending: INTERNAL MEDICINE
Payer: MEDICARE

## 2023-05-10 DIAGNOSIS — R80.9 MICROALBUMINURIA: ICD-10-CM

## 2023-05-10 DIAGNOSIS — E83.39 HYPERPHOSPHATEMIA: ICD-10-CM

## 2023-05-10 DIAGNOSIS — N18.4 ANEMIA OF CHRONIC RENAL FAILURE, STAGE 4 (SEVERE): ICD-10-CM

## 2023-05-10 DIAGNOSIS — N18.4 CHRONIC KIDNEY DISEASE, STAGE IV (SEVERE): ICD-10-CM

## 2023-05-10 DIAGNOSIS — E79.0 HYPERURICEMIA: ICD-10-CM

## 2023-05-10 DIAGNOSIS — D50.9 IRON DEFICIENCY ANEMIA, UNSPECIFIED IRON DEFICIENCY ANEMIA TYPE: ICD-10-CM

## 2023-05-10 DIAGNOSIS — R60.0 LOCALIZED EDEMA: ICD-10-CM

## 2023-05-10 DIAGNOSIS — E87.20 ACIDOSIS: ICD-10-CM

## 2023-05-10 DIAGNOSIS — N25.81 SECONDARY HYPERPARATHYROIDISM OF RENAL ORIGIN: ICD-10-CM

## 2023-05-10 DIAGNOSIS — D63.1 ANEMIA OF CHRONIC RENAL FAILURE, STAGE 4 (SEVERE): ICD-10-CM

## 2023-05-10 DIAGNOSIS — I10 HYPERTENSION, ESSENTIAL: ICD-10-CM

## 2023-05-10 LAB
ALBUMIN SERPL BCP-MCNC: 3.4 G/DL (ref 3.5–5.2)
ALP SERPL-CCNC: 254 U/L (ref 55–135)
ALT SERPL W/O P-5'-P-CCNC: 26 U/L (ref 10–44)
ANION GAP SERPL CALC-SCNC: 7 MMOL/L (ref 8–16)
AST SERPL-CCNC: 31 U/L (ref 10–40)
BASOPHILS # BLD AUTO: 0 K/UL (ref 0–0.2)
BASOPHILS NFR BLD: 0 % (ref 0–1.9)
BILIRUB SERPL-MCNC: 0.8 MG/DL (ref 0.1–1)
BILIRUB UR QL STRIP: NEGATIVE
BUN SERPL-MCNC: 36 MG/DL (ref 8–23)
CALCIUM SERPL-MCNC: 8.7 MG/DL (ref 8.7–10.5)
CHLORIDE SERPL-SCNC: 108 MMOL/L (ref 95–110)
CLARITY UR: CLEAR
CO2 SERPL-SCNC: 23 MMOL/L (ref 23–29)
COLOR UR: YELLOW
CREAT SERPL-MCNC: 1.9 MG/DL (ref 0.5–1.4)
CREAT UR-MCNC: 44 MG/DL (ref 15–325)
DIFFERENTIAL METHOD: ABNORMAL
EOSINOPHIL # BLD AUTO: 0 K/UL (ref 0–0.5)
EOSINOPHIL NFR BLD: 0 % (ref 0–8)
ERYTHROCYTE [DISTWIDTH] IN BLOOD BY AUTOMATED COUNT: 15.4 % (ref 11.5–14.5)
EST. GFR  (NO RACE VARIABLE): 29 ML/MIN/1.73 M^2
GLUCOSE SERPL-MCNC: 110 MG/DL (ref 70–110)
GLUCOSE UR QL STRIP: NEGATIVE
HCT VFR BLD AUTO: 32.5 % (ref 37–48.5)
HGB BLD-MCNC: 10.3 G/DL (ref 12–16)
HGB UR QL STRIP: NEGATIVE
IMM GRANULOCYTES # BLD AUTO: 0.02 K/UL (ref 0–0.04)
IMM GRANULOCYTES NFR BLD AUTO: 0.5 % (ref 0–0.5)
KETONES UR QL STRIP: NEGATIVE
LEUKOCYTE ESTERASE UR QL STRIP: NEGATIVE
LYMPHOCYTES # BLD AUTO: 0.6 K/UL (ref 1–4.8)
LYMPHOCYTES NFR BLD: 14.9 % (ref 18–48)
MAGNESIUM SERPL-MCNC: 1.9 MG/DL (ref 1.6–2.6)
MCH RBC QN AUTO: 31 PG (ref 27–31)
MCHC RBC AUTO-ENTMCNC: 31.7 G/DL (ref 32–36)
MCV RBC AUTO: 98 FL (ref 82–98)
MICROSCOPIC COMMENT: NORMAL
MONOCYTES # BLD AUTO: 0.3 K/UL (ref 0.3–1)
MONOCYTES NFR BLD: 7.7 % (ref 4–15)
NEUTROPHILS # BLD AUTO: 3.2 K/UL (ref 1.8–7.7)
NEUTROPHILS NFR BLD: 76.9 % (ref 38–73)
NITRITE UR QL STRIP: NEGATIVE
NRBC BLD-RTO: 0 /100 WBC
PH UR STRIP: 5 [PH] (ref 5–8)
PHOSPHATE SERPL-MCNC: 4.4 MG/DL (ref 2.7–4.5)
PLATELET # BLD AUTO: 57 K/UL (ref 150–450)
PMV BLD AUTO: 10.8 FL (ref 9.2–12.9)
POTASSIUM SERPL-SCNC: 4.6 MMOL/L (ref 3.5–5.1)
PROT SERPL-MCNC: 7 G/DL (ref 6–8.4)
PROT UR QL STRIP: NEGATIVE
PROT UR-MCNC: <7 MG/DL
PROT/CREAT UR: NORMAL MG/G{CREAT} (ref 0–0.2)
RBC # BLD AUTO: 3.32 M/UL (ref 4–5.4)
SODIUM SERPL-SCNC: 138 MMOL/L (ref 136–145)
SP GR UR STRIP: 1.01 (ref 1–1.03)
URN SPEC COLLECT METH UR: NORMAL
UROBILINOGEN UR STRIP-ACNC: NEGATIVE EU/DL
WBC # BLD AUTO: 4.17 K/UL (ref 3.9–12.7)

## 2023-05-10 PROCEDURE — 36415 COLL VENOUS BLD VENIPUNCTURE: CPT | Performed by: INTERNAL MEDICINE

## 2023-05-10 PROCEDURE — 80053 COMPREHEN METABOLIC PANEL: CPT | Performed by: INTERNAL MEDICINE

## 2023-05-10 PROCEDURE — 85025 COMPLETE CBC W/AUTO DIFF WBC: CPT | Performed by: INTERNAL MEDICINE

## 2023-05-10 PROCEDURE — 82570 ASSAY OF URINE CREATININE: CPT | Performed by: INTERNAL MEDICINE

## 2023-05-10 PROCEDURE — 81000 URINALYSIS NONAUTO W/SCOPE: CPT | Performed by: INTERNAL MEDICINE

## 2023-05-10 PROCEDURE — 83735 ASSAY OF MAGNESIUM: CPT | Performed by: INTERNAL MEDICINE

## 2023-05-10 PROCEDURE — 84100 ASSAY OF PHOSPHORUS: CPT | Performed by: INTERNAL MEDICINE
